# Patient Record
Sex: FEMALE | Race: BLACK OR AFRICAN AMERICAN | Employment: UNEMPLOYED | URBAN - METROPOLITAN AREA
[De-identification: names, ages, dates, MRNs, and addresses within clinical notes are randomized per-mention and may not be internally consistent; named-entity substitution may affect disease eponyms.]

---

## 2021-03-03 ENCOUNTER — HOSPITAL ENCOUNTER (EMERGENCY)
Age: 61
Discharge: HOME | End: 2021-03-04
Attending: EMERGENCY MEDICINE
Payer: MEDICAID

## 2021-03-03 ENCOUNTER — APPOINTMENT (OUTPATIENT)
Dept: GENERAL RADIOLOGY | Age: 61
End: 2021-03-03
Attending: EMERGENCY MEDICINE
Payer: MEDICAID

## 2021-03-03 DIAGNOSIS — R07.9 CHEST PAIN, UNSPECIFIED TYPE: Primary | ICD-10-CM

## 2021-03-03 PROCEDURE — 71045 X-RAY EXAM CHEST 1 VIEW: CPT

## 2021-03-03 PROCEDURE — 93005 ELECTROCARDIOGRAM TRACING: CPT

## 2021-03-03 PROCEDURE — 99285 EMERGENCY DEPT VISIT HI MDM: CPT

## 2021-03-04 VITALS
SYSTOLIC BLOOD PRESSURE: 97 MMHG | RESPIRATION RATE: 13 BRPM | WEIGHT: 108 LBS | OXYGEN SATURATION: 99 % | TEMPERATURE: 98.3 F | HEART RATE: 87 BPM | DIASTOLIC BLOOD PRESSURE: 68 MMHG | BODY MASS INDEX: 20.41 KG/M2

## 2021-03-04 LAB
ALBUMIN SERPL-MCNC: 2.9 G/DL (ref 3.4–5)
ALBUMIN/GLOB SERPL: 0.5 {RATIO} (ref 0.8–1.7)
ALP SERPL-CCNC: 86 U/L (ref 45–117)
ALT SERPL-CCNC: 29 U/L (ref 13–56)
ANION GAP SERPL CALC-SCNC: 7 MMOL/L (ref 3–18)
APPEARANCE UR: CLEAR
AST SERPL-CCNC: 17 U/L (ref 10–38)
ATRIAL RATE: 88 BPM
BASOPHILS # BLD: 0 K/UL (ref 0–0.1)
BASOPHILS NFR BLD: 1 % (ref 0–2)
BILIRUB SERPL-MCNC: 0.2 MG/DL (ref 0.2–1)
BILIRUB UR QL: NEGATIVE
BUN SERPL-MCNC: 52 MG/DL (ref 7–18)
BUN/CREAT SERPL: 15 (ref 12–20)
CALCIUM SERPL-MCNC: 9.3 MG/DL (ref 8.5–10.1)
CALCULATED P AXIS, ECG09: 58 DEGREES
CALCULATED R AXIS, ECG10: 26 DEGREES
CALCULATED T AXIS, ECG11: 56 DEGREES
CHLORIDE SERPL-SCNC: 117 MMOL/L (ref 100–111)
CK MB CFR SERPL CALC: 3.3 % (ref 0–4)
CK MB CFR SERPL CALC: 4 % (ref 0–4)
CK MB SERPL-MCNC: 1.4 NG/ML (ref 5–25)
CK MB SERPL-MCNC: 1.9 NG/ML (ref 5–25)
CK SERPL-CCNC: 43 U/L (ref 26–192)
CK SERPL-CCNC: 48 U/L (ref 26–192)
CO2 SERPL-SCNC: 18 MMOL/L (ref 21–32)
COLOR UR: YELLOW
CREAT SERPL-MCNC: 3.58 MG/DL (ref 0.6–1.3)
DIAGNOSIS, 93000: NORMAL
DIFFERENTIAL METHOD BLD: ABNORMAL
EOSINOPHIL # BLD: 0.4 K/UL (ref 0–0.4)
EOSINOPHIL NFR BLD: 12 % (ref 0–5)
EPITH CASTS URNS QL MICRO: NORMAL /LPF (ref 0–5)
ERYTHROCYTE [DISTWIDTH] IN BLOOD BY AUTOMATED COUNT: 19.3 % (ref 11.6–14.5)
GLOBULIN SER CALC-MCNC: 5.5 G/DL (ref 2–4)
GLUCOSE SERPL-MCNC: 83 MG/DL (ref 74–99)
GLUCOSE UR STRIP.AUTO-MCNC: NEGATIVE MG/DL
HCT VFR BLD AUTO: 30.8 % (ref 35–45)
HGB BLD-MCNC: 9.5 G/DL (ref 12–16)
HGB UR QL STRIP: NEGATIVE
KETONES UR QL STRIP.AUTO: NEGATIVE MG/DL
LEUKOCYTE ESTERASE UR QL STRIP.AUTO: NEGATIVE
LYMPHOCYTES # BLD: 1.3 K/UL (ref 0.9–3.6)
LYMPHOCYTES NFR BLD: 38 % (ref 21–52)
MCH RBC QN AUTO: 30 PG (ref 24–34)
MCHC RBC AUTO-ENTMCNC: 30.8 G/DL (ref 31–37)
MCV RBC AUTO: 97.2 FL (ref 74–97)
MONOCYTES # BLD: 0.5 K/UL (ref 0.05–1.2)
MONOCYTES NFR BLD: 14 % (ref 3–10)
NEUTS SEG # BLD: 1.2 K/UL (ref 1.8–8)
NEUTS SEG NFR BLD: 35 % (ref 40–73)
NITRITE UR QL STRIP.AUTO: NEGATIVE
P-R INTERVAL, ECG05: 168 MS
PH UR STRIP: 6.5 [PH] (ref 5–8)
PLATELET # BLD AUTO: 381 K/UL (ref 135–420)
PMV BLD AUTO: 8.8 FL (ref 9.2–11.8)
POTASSIUM SERPL-SCNC: 5.3 MMOL/L (ref 3.5–5.5)
PROT SERPL-MCNC: 8.4 G/DL (ref 6.4–8.2)
PROT UR STRIP-MCNC: 300 MG/DL
Q-T INTERVAL, ECG07: 356 MS
QRS DURATION, ECG06: 72 MS
QTC CALCULATION (BEZET), ECG08: 430 MS
RBC # BLD AUTO: 3.17 M/UL (ref 4.2–5.3)
SODIUM SERPL-SCNC: 142 MMOL/L (ref 136–145)
SP GR UR REFRACTOMETRY: 1.01 (ref 1–1.03)
TROPONIN I SERPL-MCNC: <0.02 NG/ML (ref 0–0.04)
TROPONIN I SERPL-MCNC: <0.02 NG/ML (ref 0–0.04)
UROBILINOGEN UR QL STRIP.AUTO: 0.2 EU/DL (ref 0.2–1)
VENTRICULAR RATE, ECG03: 88 BPM
WBC # BLD AUTO: 3.4 K/UL (ref 4.6–13.2)
WBC URNS QL MICRO: NORMAL /HPF (ref 0–5)

## 2021-03-04 PROCEDURE — 80053 COMPREHEN METABOLIC PANEL: CPT

## 2021-03-04 PROCEDURE — 85025 COMPLETE CBC W/AUTO DIFF WBC: CPT

## 2021-03-04 PROCEDURE — 82550 ASSAY OF CK (CPK): CPT

## 2021-03-04 PROCEDURE — 81001 URINALYSIS AUTO W/SCOPE: CPT

## 2021-03-04 NOTE — ED PROVIDER NOTES
EMERGENCY DEPARTMENT HISTORY AND PHYSICAL EXAM  This was created with voice recognition software and transcription errors may be present. 10:51 PM  Date: 3/3/2021  Patient Name: Ashley Cope    History of Presenting Illness     Chief Complaint:    History Provided By:     HPI: Ashley Cope is a 64 y.o. female past medical history of AIDS ESRD on dialysis hep C hep B HIV latent syphilis TB schizoaffective disorder and substance abuse who presents with chest pain. Patient recently moved here from [facility] she states that she was dialyzed yesterday but I do see that she is not been dialyzed since the 28th per nursing notes. She notes that she has had chest pain has been constant for 2 days denies any abdominal pain denies fevers chills. She states she woke up from sleep and was complaining of chest pain so her daughter called EMS. No radiation. Nonexertional no vomiting or diaphoresis. PCP: Rick, Not On File      Past History     Past Medical History:  Past Medical History:   Diagnosis Date    Acute kidney injury ([facility]     AIDS (acquired immune deficiency syndrome) ([facility]     Anemia     Esophageal candidiasis ([facility]     ESRD (end stage renal disease) ([facility]     HCV (hepatitis C virus)     Hepatitis B     Hepatitis C     HIV (human immunodeficiency virus infection) ([facility]     HIV (human immunodeficiency virus infection) ([facility]     Late latent syphilis     Pulmonary TB     Schizoaffective disorder ([facility]     Substance abuse ([facility]        Past Surgical History:  Past Surgical History:   Procedure Laterality Date    HX GYN      HX NEPHRECTOMY      left kidney removed at age 17   Doloris Chance NEPHRECTOMY         Family History:  No family history on file. Social History:  Social History     Tobacco Use    Smoking status: Not on file   Substance Use Topics    Alcohol use: Not on file    Drug use: Not on file       Allergies:   Allergies   Allergen Reactions    Aspirin Hives    Pcn [Penicillins] Hives       Review of Systems     Review of Systems   All other systems reviewed and are negative. 10 point review of systems otherwise negative unless noted in HPI. Physical Exam       Physical Exam  Constitutional:       Appearance: She is well-developed. HENT:      Head: Normocephalic and atraumatic. Eyes:      Pupils: Pupils are equal, round, and reactive to light. Neck:      Musculoskeletal: Normal range of motion and neck supple. Cardiovascular:      Rate and Rhythm: Normal rate and regular rhythm. Heart sounds: Normal heart sounds. No murmur. No friction rub. Pulmonary:      Effort: Pulmonary effort is normal. No respiratory distress. Breath sounds: Normal breath sounds. No wheezing. Abdominal:      General: There is no distension. Palpations: Abdomen is soft. Tenderness: There is no abdominal tenderness. There is no guarding or rebound. Musculoskeletal: Normal range of motion. Skin:     General: Skin is warm and dry. Neurological:      Mental Status: She is alert and oriented to person, place, and time. Psychiatric:         Behavior: Behavior normal.         Thought Content: Thought content normal.         Diagnostic Study Results     Vital Signs  EKG: KG shows sinus at 88 normal axis normal intervals there is no ST elevation or depression and no hypertrophy  Labs: BC unremarkable chemistry consistent with chronic renal disease troponin negative x2 chest x-ray is unremarkable consistent with dialysis  Imaging:     Medical Decision Making     ED Course: Progress Notes, Reevaluation, and Consults:      Provider Notes (Medical Decision Making): Patient presents with chest pain troponin negative x2 afebrile will refer to ID as well as a PCP for further observation and management         Diagnosis     Clinical Impression: No diagnosis found.     Disposition:    Patient's Medications   Start Taking    No medications on file   Continue Taking    EMTRICITABINE-TENOFOVIR (TRUVADA) 200-300 MG PER TABLET    Take 1 tablet by mouth daily. RALTEGRAVIR (ISENTRESS) 400 MG TABLET    Take 400 mg by mouth two (2) times a day. RISPERIDONE (RISPERDAL PO)    Take  by mouth.    These Medications have changed    No medications on file   Stop Taking    No medications on file

## 2021-03-04 NOTE — ED NOTES
Patient ambulated to bathroom.    On return to room, I informed pt that I would be in in 30 minutes to dry his blood

## 2021-03-04 NOTE — ED TRIAGE NOTES
Pt brought in by ems for complaint of lethargy. Per patient she states she woke up and she just did not feel good. States her belly cannot breath. Pt abdomen distended and taut. Per medic pt has not had dialysis since 2/28/21 due to moving here from [place].

## 2021-10-24 ENCOUNTER — HOSPITAL ENCOUNTER (OUTPATIENT)
Age: 61
Setting detail: OBSERVATION
Discharge: HOME HEALTH | End: 2021-10-29
Attending: STUDENT IN AN ORGANIZED HEALTH CARE EDUCATION/TRAINING PROGRAM | Admitting: STUDENT IN AN ORGANIZED HEALTH CARE EDUCATION/TRAINING PROGRAM
Payer: COMMERCIAL

## 2021-10-24 ENCOUNTER — APPOINTMENT (OUTPATIENT)
Dept: GENERAL RADIOLOGY | Age: 61
End: 2021-10-24
Attending: STUDENT IN AN ORGANIZED HEALTH CARE EDUCATION/TRAINING PROGRAM
Payer: COMMERCIAL

## 2021-10-24 DIAGNOSIS — E87.20 LACTIC ACIDOSIS: ICD-10-CM

## 2021-10-24 DIAGNOSIS — E87.29 RESPIRATORY ACIDOSIS: ICD-10-CM

## 2021-10-24 LAB
ALBUMIN SERPL-MCNC: 2.3 G/DL (ref 3.4–5)
ALBUMIN/GLOB SERPL: 0.4 {RATIO} (ref 0.8–1.7)
ALP SERPL-CCNC: 115 U/L (ref 45–117)
ALT SERPL-CCNC: 12 U/L (ref 13–56)
ANION GAP SERPL CALC-SCNC: 12 MMOL/L (ref 3–18)
ARTERIAL PATENCY WRIST A: POSITIVE
AST SERPL-CCNC: 37 U/L (ref 10–38)
BASE DEFICIT BLD-SCNC: 5.5 MMOL/L
BASOPHILS # BLD: 0.2 K/UL (ref 0–0.1)
BASOPHILS NFR BLD: 2 % (ref 0–2)
BDY SITE: ABNORMAL
BILIRUB SERPL-MCNC: <0.1 MG/DL (ref 0.2–1)
BNP SERPL-MCNC: ABNORMAL PG/ML (ref 0–900)
BUN SERPL-MCNC: 26 MG/DL (ref 7–18)
BUN/CREAT SERPL: 5 (ref 12–20)
CALCIUM SERPL-MCNC: 7 MG/DL (ref 8.5–10.1)
CHLORIDE SERPL-SCNC: 108 MMOL/L (ref 100–111)
CK MB CFR SERPL CALC: 0.8 % (ref 0–4)
CK MB SERPL-MCNC: 1.5 NG/ML (ref 5–25)
CK SERPL-CCNC: 184 U/L (ref 26–192)
CO2 SERPL-SCNC: 20 MMOL/L (ref 21–32)
CREAT SERPL-MCNC: 5.2 MG/DL (ref 0.6–1.3)
DIFFERENTIAL METHOD BLD: ABNORMAL
EOSINOPHIL # BLD: 0.7 K/UL (ref 0–0.4)
EOSINOPHIL NFR BLD: 7 % (ref 0–5)
ERYTHROCYTE [DISTWIDTH] IN BLOOD BY AUTOMATED COUNT: 16.4 % (ref 11.6–14.5)
GAS FLOW.O2 O2 DELIVERY SYS: ABNORMAL L/MIN
GAS FLOW.O2 SETTING OXYMISER: 8 BPM
GLOBULIN SER CALC-MCNC: 5.4 G/DL (ref 2–4)
GLUCOSE SERPL-MCNC: 140 MG/DL (ref 74–99)
HBV SURFACE AG SER QL: 0.27 INDEX
HBV SURFACE AG SER QL: NEGATIVE
HCO3 BLD-SCNC: 22.2 MMOL/L (ref 22–26)
HCT VFR BLD AUTO: 32.2 % (ref 35–45)
HGB BLD-MCNC: 9.5 G/DL (ref 12–16)
LACTATE BLD-SCNC: 3.25 MMOL/L (ref 0.4–2)
LYMPHOCYTES # BLD: 5.6 K/UL (ref 0.9–3.6)
LYMPHOCYTES NFR BLD: 53 % (ref 21–52)
MAGNESIUM SERPL-MCNC: 1.7 MG/DL (ref 1.6–2.6)
MCH RBC QN AUTO: 29.1 PG (ref 24–34)
MCHC RBC AUTO-ENTMCNC: 29.5 G/DL (ref 31–37)
MCV RBC AUTO: 98.5 FL (ref 78–100)
MONOCYTES # BLD: 0.8 K/UL (ref 0.05–1.2)
MONOCYTES NFR BLD: 8 % (ref 3–10)
NEUTS SEG # BLD: 3.1 K/UL (ref 1.8–8)
NEUTS SEG NFR BLD: 30 % (ref 40–73)
O2/TOTAL GAS SETTING VFR VENT: 70 %
PCO2 BLD: 52.6 MMHG (ref 35–45)
PEEP RESPIRATORY: 5 CMH2O
PH BLD: 7.23 [PH] (ref 7.35–7.45)
PIP ISTAT,IPIP: 18
PLATELET # BLD AUTO: 418 K/UL (ref 135–420)
PLATELET COMMENTS,PCOM: ABNORMAL
PMV BLD AUTO: 10.4 FL (ref 9.2–11.8)
PO2 BLD: 108 MMHG (ref 80–100)
POTASSIUM SERPL-SCNC: 3.8 MMOL/L (ref 3.5–5.5)
PRESSURE SUPPORT SETTING VENT: 13 CMH2O
PROT SERPL-MCNC: 7.7 G/DL (ref 6.4–8.2)
RBC # BLD AUTO: 3.27 M/UL (ref 4.2–5.3)
RBC MORPH BLD: ABNORMAL
SAO2 % BLD: 97 % (ref 92–97)
SERVICE CMNT-IMP: ABNORMAL
SERVICE CMNT-IMP: ABNORMAL
SODIUM SERPL-SCNC: 140 MMOL/L (ref 136–145)
SPECIMEN TYPE: ABNORMAL
TROPONIN I SERPL-MCNC: <0.02 NG/ML (ref 0–0.04)
VENTILATION MODE VENT: ABNORMAL
WBC # BLD AUTO: 10.4 K/UL (ref 4.6–13.2)

## 2021-10-24 PROCEDURE — 83735 ASSAY OF MAGNESIUM: CPT

## 2021-10-24 PROCEDURE — 82803 BLOOD GASES ANY COMBINATION: CPT

## 2021-10-24 PROCEDURE — 71045 X-RAY EXAM CHEST 1 VIEW: CPT

## 2021-10-24 PROCEDURE — 82553 CREATINE MB FRACTION: CPT

## 2021-10-24 PROCEDURE — 85025 COMPLETE CBC W/AUTO DIFF WBC: CPT

## 2021-10-24 PROCEDURE — 93005 ELECTROCARDIOGRAM TRACING: CPT

## 2021-10-24 PROCEDURE — 86704 HEP B CORE ANTIBODY TOTAL: CPT

## 2021-10-24 PROCEDURE — 80053 COMPREHEN METABOLIC PANEL: CPT

## 2021-10-24 PROCEDURE — 74011250636 HC RX REV CODE- 250/636: Performed by: STUDENT IN AN ORGANIZED HEALTH CARE EDUCATION/TRAINING PROGRAM

## 2021-10-24 PROCEDURE — 94640 AIRWAY INHALATION TREATMENT: CPT

## 2021-10-24 PROCEDURE — 99285 EMERGENCY DEPT VISIT HI MDM: CPT

## 2021-10-24 PROCEDURE — 74011250637 HC RX REV CODE- 250/637

## 2021-10-24 PROCEDURE — 74011000250 HC RX REV CODE- 250: Performed by: STUDENT IN AN ORGANIZED HEALTH CARE EDUCATION/TRAINING PROGRAM

## 2021-10-24 PROCEDURE — 87340 HEPATITIS B SURFACE AG IA: CPT

## 2021-10-24 PROCEDURE — 96367 TX/PROPH/DG ADDL SEQ IV INF: CPT

## 2021-10-24 PROCEDURE — 96365 THER/PROPH/DIAG IV INF INIT: CPT

## 2021-10-24 PROCEDURE — 83880 ASSAY OF NATRIURETIC PEPTIDE: CPT

## 2021-10-24 PROCEDURE — 94660 CPAP INITIATION&MGMT: CPT

## 2021-10-24 PROCEDURE — 90935 HEMODIALYSIS ONE EVALUATION: CPT

## 2021-10-24 PROCEDURE — 83605 ASSAY OF LACTIC ACID: CPT

## 2021-10-24 PROCEDURE — 36600 WITHDRAWAL OF ARTERIAL BLOOD: CPT

## 2021-10-24 PROCEDURE — 87040 BLOOD CULTURE FOR BACTERIA: CPT

## 2021-10-24 PROCEDURE — 96375 TX/PRO/DX INJ NEW DRUG ADDON: CPT

## 2021-10-24 PROCEDURE — 96366 THER/PROPH/DIAG IV INF ADDON: CPT

## 2021-10-24 PROCEDURE — 86706 HEP B SURFACE ANTIBODY: CPT

## 2021-10-24 RX ORDER — IPRATROPIUM BROMIDE AND ALBUTEROL SULFATE 2.5; .5 MG/3ML; MG/3ML
3 SOLUTION RESPIRATORY (INHALATION)
Status: DISPENSED | OUTPATIENT
Start: 2021-10-24 | End: 2021-10-24

## 2021-10-24 RX ORDER — LORAZEPAM 2 MG/ML
1 INJECTION INTRAMUSCULAR
Status: COMPLETED | OUTPATIENT
Start: 2021-10-24 | End: 2021-10-24

## 2021-10-24 RX ORDER — SODIUM CHLORIDE 0.9 % (FLUSH) 0.9 %
5-10 SYRINGE (ML) INJECTION AS NEEDED
Status: DISCONTINUED | OUTPATIENT
Start: 2021-10-24 | End: 2021-10-29 | Stop reason: HOSPADM

## 2021-10-24 RX ORDER — HEPARIN SODIUM 1000 [USP'U]/ML
4000 INJECTION, SOLUTION INTRAVENOUS; SUBCUTANEOUS
Status: DISCONTINUED | OUTPATIENT
Start: 2021-10-24 | End: 2021-10-29 | Stop reason: HOSPADM

## 2021-10-24 RX ORDER — MAGNESIUM SULFATE HEPTAHYDRATE 40 MG/ML
2 INJECTION, SOLUTION INTRAVENOUS ONCE
Status: COMPLETED | OUTPATIENT
Start: 2021-10-24 | End: 2021-10-24

## 2021-10-24 RX ORDER — NITROGLYCERIN 40 MG/100ML
100 INJECTION INTRAVENOUS
Status: DISCONTINUED | OUTPATIENT
Start: 2021-10-24 | End: 2021-10-28

## 2021-10-24 RX ADMIN — LORAZEPAM 1 MG: 2 INJECTION INTRAMUSCULAR; INTRAVENOUS at 20:26

## 2021-10-24 RX ADMIN — WATER 2 G: 1 INJECTION INTRAMUSCULAR; INTRAVENOUS; SUBCUTANEOUS at 20:39

## 2021-10-24 RX ADMIN — NITROGLYCERIN 1 INCH: 20 OINTMENT TOPICAL at 20:13

## 2021-10-24 RX ADMIN — IPRATROPIUM BROMIDE AND ALBUTEROL SULFATE 3 ML: .5; 2.5 SOLUTION RESPIRATORY (INHALATION) at 20:41

## 2021-10-24 RX ADMIN — MAGNESIUM SULFATE 2 G: 2 INJECTION INTRAVENOUS at 20:38

## 2021-10-24 RX ADMIN — METHYLPREDNISOLONE SODIUM SUCCINATE 125 MG: 125 INJECTION, POWDER, FOR SOLUTION INTRAMUSCULAR; INTRAVENOUS at 20:38

## 2021-10-24 RX ADMIN — NITROGLYCERIN 100 MCG/MIN: 40 INJECTION INTRAVENOUS at 20:55

## 2021-10-24 RX ADMIN — VANCOMYCIN HYDROCHLORIDE 1000 MG: 1 INJECTION, POWDER, LYOPHILIZED, FOR SOLUTION INTRAVENOUS at 21:07

## 2021-10-24 NOTE — ED TRIAGE NOTES
Pt to ED via EMS with SOB that started last night. Pt with history of renal failure, on dialysis with TDC to right upper chest.  EMS started BiPap in route and attempted to administer breathing treatments but pt unable to tolerate. Pt presents to ED awake alert and oriented, SOB and Dyspnea at rest noted, pt placed on non-rebreather at time of triage.

## 2021-10-25 PROBLEM — Z99.2 ESRD (END STAGE RENAL DISEASE) ON DIALYSIS (HCC): Status: ACTIVE | Noted: 2021-10-25

## 2021-10-25 PROBLEM — E87.70 VOLUME OVERLOAD: Status: ACTIVE | Noted: 2021-10-25

## 2021-10-25 PROBLEM — J96.90 RESPIRATORY FAILURE (HCC): Status: ACTIVE | Noted: 2021-10-25

## 2021-10-25 PROBLEM — R09.02 HYPOXIA: Status: ACTIVE | Noted: 2021-10-25

## 2021-10-25 PROBLEM — N18.6 ESRD (END STAGE RENAL DISEASE) ON DIALYSIS (HCC): Status: ACTIVE | Noted: 2021-10-25

## 2021-10-25 PROBLEM — I10 HYPERTENSION: Status: ACTIVE | Noted: 2021-10-25

## 2021-10-25 LAB
ALBUMIN SERPL-MCNC: 2 G/DL (ref 3.4–5)
ALBUMIN/GLOB SERPL: 0.4 {RATIO} (ref 0.8–1.7)
ALP SERPL-CCNC: 79 U/L (ref 45–117)
ALT SERPL-CCNC: 9 U/L (ref 13–56)
ANION GAP SERPL CALC-SCNC: 6 MMOL/L (ref 3–18)
AST SERPL-CCNC: 24 U/L (ref 10–38)
ATRIAL RATE: 132 BPM
BASOPHILS # BLD: 0 K/UL (ref 0–0.1)
BASOPHILS NFR BLD: 1 % (ref 0–2)
BILIRUB SERPL-MCNC: 0.3 MG/DL (ref 0.2–1)
BUN SERPL-MCNC: 14 MG/DL (ref 7–18)
BUN/CREAT SERPL: 5 (ref 12–20)
CALCIUM SERPL-MCNC: 7.5 MG/DL (ref 8.5–10.1)
CALCULATED R AXIS, ECG10: 2 DEGREES
CALCULATED T AXIS, ECG11: 87 DEGREES
CHLORIDE SERPL-SCNC: 103 MMOL/L (ref 100–111)
CO2 SERPL-SCNC: 27 MMOL/L (ref 21–32)
CREAT SERPL-MCNC: 3.04 MG/DL (ref 0.6–1.3)
DIAGNOSIS, 93000: NORMAL
DIFFERENTIAL METHOD BLD: ABNORMAL
EOSINOPHIL # BLD: 0 K/UL (ref 0–0.4)
EOSINOPHIL NFR BLD: 1 % (ref 0–5)
ERYTHROCYTE [DISTWIDTH] IN BLOOD BY AUTOMATED COUNT: 16.3 % (ref 11.6–14.5)
GLOBULIN SER CALC-MCNC: 4.6 G/DL (ref 2–4)
GLUCOSE SERPL-MCNC: 131 MG/DL (ref 74–99)
HBV SURFACE AB SER QL IA: POSITIVE
HBV SURFACE AB SERPL IA-ACNC: 20.72 MIU/ML
HCT VFR BLD AUTO: 23.7 % (ref 35–45)
HCT VFR BLD AUTO: 26.3 % (ref 35–45)
HEP BS AB COMMENT,HBSAC: NORMAL
HGB BLD-MCNC: 7.4 G/DL (ref 12–16)
HGB BLD-MCNC: 8.2 G/DL (ref 12–16)
LYMPHOCYTES # BLD: 1.1 K/UL (ref 0.9–3.6)
LYMPHOCYTES NFR BLD: 26 % (ref 21–52)
MCH RBC QN AUTO: 28.8 PG (ref 24–34)
MCHC RBC AUTO-ENTMCNC: 31.2 G/DL (ref 31–37)
MCV RBC AUTO: 92.2 FL (ref 78–100)
MONOCYTES # BLD: 0.5 K/UL (ref 0.05–1.2)
MONOCYTES NFR BLD: 12 % (ref 3–10)
NEUTS SEG # BLD: 2.4 K/UL (ref 1.8–8)
NEUTS SEG NFR BLD: 60 % (ref 40–73)
P-R INTERVAL, ECG05: 112 MS
PHOSPHATE SERPL-MCNC: 3.9 MG/DL (ref 2.5–4.9)
PLATELET # BLD AUTO: 287 K/UL (ref 135–420)
PMV BLD AUTO: 10.1 FL (ref 9.2–11.8)
POTASSIUM SERPL-SCNC: 3.7 MMOL/L (ref 3.5–5.5)
PROT SERPL-MCNC: 6.6 G/DL (ref 6.4–8.2)
Q-T INTERVAL, ECG07: 384 MS
QRS DURATION, ECG06: 62 MS
QTC CALCULATION (BEZET), ECG08: 568 MS
RBC # BLD AUTO: 2.57 M/UL (ref 4.2–5.3)
SODIUM SERPL-SCNC: 136 MMOL/L (ref 136–145)
VENTRICULAR RATE, ECG03: 132 BPM
WBC # BLD AUTO: 4 K/UL (ref 4.6–13.2)

## 2021-10-25 PROCEDURE — 96375 TX/PRO/DX INJ NEW DRUG ADDON: CPT

## 2021-10-25 PROCEDURE — 65270000029 HC RM PRIVATE

## 2021-10-25 PROCEDURE — 74011250637 HC RX REV CODE- 250/637: Performed by: STUDENT IN AN ORGANIZED HEALTH CARE EDUCATION/TRAINING PROGRAM

## 2021-10-25 PROCEDURE — 74011250637 HC RX REV CODE- 250/637: Performed by: FAMILY MEDICINE

## 2021-10-25 PROCEDURE — 74011250636 HC RX REV CODE- 250/636: Performed by: STUDENT IN AN ORGANIZED HEALTH CARE EDUCATION/TRAINING PROGRAM

## 2021-10-25 PROCEDURE — 96366 THER/PROPH/DIAG IV INF ADDON: CPT

## 2021-10-25 PROCEDURE — 85014 HEMATOCRIT: CPT

## 2021-10-25 PROCEDURE — 85025 COMPLETE CBC W/AUTO DIFF WBC: CPT

## 2021-10-25 PROCEDURE — 74011000250 HC RX REV CODE- 250: Performed by: STUDENT IN AN ORGANIZED HEALTH CARE EDUCATION/TRAINING PROGRAM

## 2021-10-25 PROCEDURE — 84100 ASSAY OF PHOSPHORUS: CPT

## 2021-10-25 PROCEDURE — 74011250636 HC RX REV CODE- 250/636: Performed by: INTERNAL MEDICINE

## 2021-10-25 PROCEDURE — 80053 COMPREHEN METABOLIC PANEL: CPT

## 2021-10-25 PROCEDURE — 99223 1ST HOSP IP/OBS HIGH 75: CPT | Performed by: STUDENT IN AN ORGANIZED HEALTH CARE EDUCATION/TRAINING PROGRAM

## 2021-10-25 PROCEDURE — 99218 HC RM OBSERVATION: CPT

## 2021-10-25 PROCEDURE — 94660 CPAP INITIATION&MGMT: CPT

## 2021-10-25 PROCEDURE — 96372 THER/PROPH/DIAG INJ SC/IM: CPT

## 2021-10-25 RX ORDER — QUETIAPINE FUMARATE 25 MG/1
25 TABLET, FILM COATED ORAL
COMMUNITY
End: 2022-02-18

## 2021-10-25 RX ORDER — ACETAMINOPHEN 325 MG/1
650 TABLET ORAL ONCE
Status: COMPLETED | OUTPATIENT
Start: 2021-10-25 | End: 2021-10-25

## 2021-10-25 RX ORDER — LAMIVUDINE 150 MG/1
75 TABLET, FILM COATED ORAL DAILY
COMMUNITY
End: 2022-02-18

## 2021-10-25 RX ORDER — FUROSEMIDE 10 MG/ML
40 INJECTION INTRAMUSCULAR; INTRAVENOUS ONCE
Status: COMPLETED | OUTPATIENT
Start: 2021-10-25 | End: 2021-10-25

## 2021-10-25 RX ORDER — LAMIVUDINE 150 MG/1
150 TABLET, FILM COATED ORAL DAILY
Status: DISCONTINUED | OUTPATIENT
Start: 2021-10-25 | End: 2021-10-29 | Stop reason: HOSPADM

## 2021-10-25 RX ORDER — ACETAMINOPHEN 650 MG/1
650 SUPPOSITORY RECTAL
Status: DISCONTINUED | OUTPATIENT
Start: 2021-10-25 | End: 2021-10-29 | Stop reason: HOSPADM

## 2021-10-25 RX ORDER — GABAPENTIN 100 MG/1
200 CAPSULE ORAL
Status: DISCONTINUED | OUTPATIENT
Start: 2021-10-25 | End: 2021-10-29 | Stop reason: HOSPADM

## 2021-10-25 RX ORDER — LANOLIN ALCOHOL/MO/W.PET/CERES
6 CREAM (GRAM) TOPICAL
COMMUNITY
End: 2022-02-18

## 2021-10-25 RX ORDER — SODIUM CHLORIDE 0.9 % (FLUSH) 0.9 %
5-40 SYRINGE (ML) INJECTION AS NEEDED
Status: DISCONTINUED | OUTPATIENT
Start: 2021-10-25 | End: 2021-10-29 | Stop reason: HOSPADM

## 2021-10-25 RX ORDER — RITONAVIR 100 MG/1
100 TABLET ORAL
Status: DISCONTINUED | OUTPATIENT
Start: 2021-10-25 | End: 2021-10-29 | Stop reason: HOSPADM

## 2021-10-25 RX ORDER — SENNOSIDES 8.6 MG/1
8.6 TABLET ORAL 2 TIMES DAILY
COMMUNITY
End: 2022-02-18

## 2021-10-25 RX ORDER — ESOMEPRAZOLE MAGNESIUM 40 MG/1
40 CAPSULE, DELAYED RELEASE ORAL DAILY
COMMUNITY
End: 2022-02-18

## 2021-10-25 RX ORDER — MIRTAZAPINE 15 MG/1
7.5 TABLET, FILM COATED ORAL
Status: DISCONTINUED | OUTPATIENT
Start: 2021-10-25 | End: 2021-10-29 | Stop reason: HOSPADM

## 2021-10-25 RX ORDER — MIRTAZAPINE 15 MG/1
7.5 TABLET, FILM COATED ORAL
COMMUNITY
End: 2022-02-18

## 2021-10-25 RX ORDER — SULFAMETHOXAZOLE AND TRIMETHOPRIM 400; 80 MG/1; MG/1
1 TABLET ORAL EVERY 12 HOURS
COMMUNITY
End: 2022-02-18

## 2021-10-25 RX ORDER — TENOFOVIR DISOPROXIL FUMARATE 300 MG/1
300 TABLET, FILM COATED ORAL
COMMUNITY
End: 2021-10-25

## 2021-10-25 RX ORDER — MORPHINE SULFATE 2 MG/ML
2 INJECTION, SOLUTION INTRAMUSCULAR; INTRAVENOUS ONCE
Status: COMPLETED | OUTPATIENT
Start: 2021-10-25 | End: 2021-10-25

## 2021-10-25 RX ORDER — SODIUM CHLORIDE 0.9 % (FLUSH) 0.9 %
5-40 SYRINGE (ML) INJECTION EVERY 8 HOURS
Status: DISCONTINUED | OUTPATIENT
Start: 2021-10-25 | End: 2021-10-29 | Stop reason: HOSPADM

## 2021-10-25 RX ORDER — DIPHENHYDRAMINE HCL 25 MG
50 CAPSULE ORAL
Status: DISCONTINUED | OUTPATIENT
Start: 2021-10-25 | End: 2021-10-29 | Stop reason: HOSPADM

## 2021-10-25 RX ORDER — HEPARIN SODIUM 5000 [USP'U]/ML
5000 INJECTION, SOLUTION INTRAVENOUS; SUBCUTANEOUS EVERY 8 HOURS
Status: DISCONTINUED | OUTPATIENT
Start: 2021-10-25 | End: 2021-10-29 | Stop reason: HOSPADM

## 2021-10-25 RX ORDER — POLYETHYLENE GLYCOL 3350 17 G/17G
17 POWDER, FOR SOLUTION ORAL DAILY PRN
Status: DISCONTINUED | OUTPATIENT
Start: 2021-10-25 | End: 2021-10-29 | Stop reason: HOSPADM

## 2021-10-25 RX ORDER — LANOLIN ALCOHOL/MO/W.PET/CERES
6 CREAM (GRAM) TOPICAL
Status: DISCONTINUED | OUTPATIENT
Start: 2021-10-25 | End: 2021-10-29 | Stop reason: HOSPADM

## 2021-10-25 RX ORDER — PANTOPRAZOLE SODIUM 40 MG/1
40 TABLET, DELAYED RELEASE ORAL
Status: DISCONTINUED | OUTPATIENT
Start: 2021-10-25 | End: 2021-10-29 | Stop reason: HOSPADM

## 2021-10-25 RX ORDER — ACETAMINOPHEN 325 MG/1
650 TABLET ORAL
Status: DISCONTINUED | OUTPATIENT
Start: 2021-10-25 | End: 2021-10-29 | Stop reason: HOSPADM

## 2021-10-25 RX ORDER — SENNOSIDES 8.6 MG/1
1 TABLET ORAL DAILY
Status: DISCONTINUED | OUTPATIENT
Start: 2021-10-25 | End: 2021-10-29 | Stop reason: HOSPADM

## 2021-10-25 RX ORDER — LANOLIN ALCOHOL/MO/W.PET/CERES
50 CREAM (GRAM) TOPICAL DAILY
Status: DISCONTINUED | OUTPATIENT
Start: 2021-10-25 | End: 2021-10-29 | Stop reason: HOSPADM

## 2021-10-25 RX ORDER — QUETIAPINE FUMARATE 25 MG/1
25 TABLET, FILM COATED ORAL
Status: DISCONTINUED | OUTPATIENT
Start: 2021-10-25 | End: 2021-10-29 | Stop reason: HOSPADM

## 2021-10-25 RX ORDER — GABAPENTIN 100 MG/1
200 CAPSULE ORAL
COMMUNITY
End: 2022-02-18

## 2021-10-25 RX ORDER — LANOLIN ALCOHOL/MO/W.PET/CERES
50 CREAM (GRAM) TOPICAL DAILY
COMMUNITY
End: 2022-02-18

## 2021-10-25 RX ADMIN — NITROGLYCERIN 100 MCG/MIN: 40 INJECTION INTRAVENOUS at 12:10

## 2021-10-25 RX ADMIN — Medication 10 ML: at 18:34

## 2021-10-25 RX ADMIN — Medication 10 ML: at 11:56

## 2021-10-25 RX ADMIN — Medication 10 ML: at 21:34

## 2021-10-25 RX ADMIN — QUETIAPINE FUMARATE 25 MG: 25 TABLET ORAL at 21:33

## 2021-10-25 RX ADMIN — GABAPENTIN 200 MG: 100 CAPSULE ORAL at 21:33

## 2021-10-25 RX ADMIN — DOLUTEGRAVIR SODIUM 50 MG: 50 TABLET, FILM COATED ORAL at 11:59

## 2021-10-25 RX ADMIN — SENNOSIDES 8.6 MG: 8.6 TABLET, COATED ORAL at 11:56

## 2021-10-25 RX ADMIN — DIPHENHYDRAMINE HYDROCHLORIDE 50 MG: 25 CAPSULE ORAL at 18:33

## 2021-10-25 RX ADMIN — HEPARIN SODIUM 4000 UNITS: 1000 INJECTION, SOLUTION INTRAVENOUS; SUBCUTANEOUS at 07:10

## 2021-10-25 RX ADMIN — MORPHINE SULFATE 2 MG: 2 INJECTION, SOLUTION INTRAMUSCULAR; INTRAVENOUS at 01:49

## 2021-10-25 RX ADMIN — HEPARIN SODIUM 5000 UNITS: 5000 INJECTION INTRAVENOUS; SUBCUTANEOUS at 09:03

## 2021-10-25 RX ADMIN — FUROSEMIDE 40 MG: 10 INJECTION, SOLUTION INTRAMUSCULAR; INTRAVENOUS at 04:38

## 2021-10-25 RX ADMIN — WATER 2 G: 1 INJECTION INTRAMUSCULAR; INTRAVENOUS; SUBCUTANEOUS at 04:38

## 2021-10-25 RX ADMIN — LAMIVUDINE 150 MG: 150 TABLET, FILM COATED ORAL at 11:55

## 2021-10-25 RX ADMIN — ACETAMINOPHEN 650 MG: 325 TABLET ORAL at 09:04

## 2021-10-25 RX ADMIN — DARUNAVIR 800 MG: 800 TABLET, FILM COATED ORAL at 11:56

## 2021-10-25 RX ADMIN — PANTOPRAZOLE SODIUM 40 MG: 20 TABLET, DELAYED RELEASE ORAL at 09:03

## 2021-10-25 RX ADMIN — MIRTAZAPINE 7.5 MG: 15 TABLET, FILM COATED ORAL at 21:33

## 2021-10-25 RX ADMIN — ACETAMINOPHEN 650 MG: 325 TABLET ORAL at 05:47

## 2021-10-25 RX ADMIN — Medication 6 MG: at 21:33

## 2021-10-25 RX ADMIN — RITONAVIR 100 MG: 100 TABLET, FILM COATED ORAL at 11:55

## 2021-10-25 NOTE — PROGRESS NOTES
attempted to complete the initial Spiritual Assessment of the patient in bed 2 of the emergency room and offer pastoral care support to the patient but found the patient resting peacefully at this time an unable to communicate now. There is no advance directive present. Patient does not have any Mandaen/cultural needs that will affect patients preferences in health care. Chaplains will continue to follow and will provide pastoral care on an as needed/requested basis.     [facility] Care Department  [phone]

## 2021-10-25 NOTE — ED NOTES
Assumed care from Dr. Abida Alvarez, in brief this is a 47-year-old female with ESRD on hemodialysis here for respiratory distress/failure, requiring BiPAP on arrival.  Initially was tachycardic to about 150s with a sinus mechanism, hypertensive to 230s over 148. Clinically suspect that she has flash pulmonary edema, as a bedside ultrasound shows significant B-lines throughout, and chest x-ray shows fluffy infiltrates. She has no leukocytosis but her lactic acid is elevated at 3.25. She received broad-spectrum antibiotics, however have withheld fluids as 30 cc/kg would do more harm than good injury with end-stage renal patient on dialysis who is clinically in fluid overload. She is started on BiPAP, nitroglycerin drip with improvement of her symptoms. She now has a heart rate of 105, blood pressure is improving on the nitroglycerin and symptomatically she feels better. We discussed with nephrology, they will arrange for emergent dialysis via the [facility] in her right chest wall, and we will continue to reassess the need for noninvasive ventilation. After dialysis patient feels significantly better, we are able to wean her oxygen requirements back to 2 L, however when she is without oxygen altogether her saturations are about 89 to 90%. Feel that she would benefit from hospital admission, potentially may need to be redialyzed.     Marie Mullen MD

## 2021-10-25 NOTE — PROGRESS NOTES
Progress Note    Ashley Cope  64 y.o. Admit Date: 10/24/2021  Active Problems:    Hypoxia (10/25/2021) POA: Unknown      ESRD [facility] (10/25/2021) POA: Unknown      Volume overload (10/25/2021) POA: Unknown      Hypertension (10/25/2021) POA: Unknown      Respiratory failure ([facility] (10/25/2021) POA: Unknown            Subjective:     Patient feels tired but better than last night after dialysis,offBIPAP. BP is better, still on Nitro drip. .       A comprehensive review of systems was negative except for that written in the History of Present Illness.     Objective:     Visit Vitals  BP (!) 142/91   Pulse (!) 116   Temp 97.4 °F (36.3 °C) (Axillary)   Resp 19   Ht 5' 1\" (1.549 m)   Wt 49 kg (108 lb)   SpO2 95%   BMI 20.41 kg/m²         Intake/Output Summary (Last 24 hours) at 10/25/2021 1234  Last data filed at 10/25/2021 0500  Gross per 24 hour   Intake 320 ml   Output 3000 ml   Net -2680 ml       Current Facility-Administered Medications   Medication Dose Route Frequency Provider Last Rate Last Admin    darunavir ethanolate (PREZISTA) tablet 800 mg  800 mg Oral DAILY WITH BREAKFAST May Castillo, DO   800 mg at 10/25/21 1156    dolutegravir (TIVICAY) tablet 50 mg  50 mg Oral DAILY May Castillo, DO   50 mg at 10/25/21 1159    pantoprazole (PROTONIX) tablet 40 mg  40 mg Oral ACB May Castillo, DO   40 mg at 10/25/21 6010    gabapentin (NEURONTIN) capsule 200 mg  200 mg Oral QHS May Castillo, DO        lamiVUDine (EPIVIR) tablet 150 mg  150 mg Oral DAILY May Castillo, DO   150 mg at 10/25/21 1155    melatonin tablet 6 mg  6 mg Oral QHS May Castillo, DO        mirtazapine (REMERON) tablet 7.5 mg  7.5 mg Oral QHS May Castillo, DO        pyridoxine (vitamin B6) (VITAMIN B-6) tablet 50 mg  50 mg Oral DAILY May Castillo,         QUEtiapine (SEROquel) tablet 25 mg  25 mg Oral QHS May Castillo,         ritonavir (NORVIR) tablet 100 mg 100 mg Oral DAILY WITH BREAKFAST May Castillo, DO   100 mg at 10/25/21 1155    senna (SENOKOT) tablet 8.6 mg  1 Tablet Oral DAILY May Castillo, DO   8.6 mg at 10/25/21 1156    sodium chloride (NS) flush 5-40 mL  5-40 mL IntraVENous Q8H May Castillo, DO   10 mL at 10/25/21 1156    sodium chloride (NS) flush 5-40 mL  5-40 mL IntraVENous PRN May Castillo, DO        acetaminophen (TYLENOL) tablet 650 mg  650 mg Oral Q6H PRN May Castillo, DO   650 mg at 10/25/21 6448    Or    acetaminophen (TYLENOL) suppository 650 mg  650 mg Rectal Q6H PRN May Castillo, DO        polyethylene glycol (MIRALAX) packet 17 g  17 g Oral DAILY PRN May Arana, DO        heparin (porcine) injection 5,000 Units  5,000 Units SubCUTAneous Q8H May Castillo, DO   5,000 Units at 10/25/21 8070    sodium chloride (NS) flush 5-10 mL  5-10 mL IntraVENous PRN May Castillo, DO        [Held by provider] nitroglycerin (TRIDIL) 400 mcg/ml infusion  100 mcg/min IntraVENous TITRATE May Castillo, DO 15 mL/hr at 10/24/21 2055 100 mcg/min at 10/24/21 2055    heparin (porcine) 1,000 unit/mL injection 4,000 Units  4,000 Units Hemodialysis DIALYSIS PRN Brissa Ponce DO   4,000 Units at 10/25/21 0710     Current Outpatient Medications   Medication Sig Dispense Refill    darunavir ethanolate (PREZISTA) 600 mg tablet Take 800 mg by mouth.  dolutegravir (Tivicay) 50 mg tab tablet Take 50 mg by mouth daily.  esomeprazole (NEXIUM) 40 mg capsule Take 40 mg by mouth daily.  gabapentin (NEURONTIN) 100 mg capsule Take 200 mg by mouth nightly.  lamiVUDine (EPIVIR) 150 mg tablet Take 150 mg by mouth daily.  melatonin 3 mg tablet Take 6 mg by mouth nightly.  mirtazapine (REMERON) 15 mg tablet Take 7.5 mg by mouth nightly.  pyridoxine, vitamin B6, (VITAMIN B-6) 50 mg tablet Take 50 mg by mouth daily.  QUEtiapine (SEROquel) 25 mg tablet Take 25 mg by mouth nightly.       ritonavir (NORVIR) 80 mg/mL solution Take 100 mg by mouth.  senna (SENOKOT) 8.6 mg tablet Take 8.6 mg by mouth two (2) times a day.  trimethoprim-sulfamethoxazole (BACTRIM, SEPTRA)  mg per tablet Take 1 Tablet by mouth every twelve (12) hours.  RISPERIDONE (RISPERDAL PO) Take  by mouth. Physical Exam:     Physical Exam:   General:  Tired & Malnourished, no distress, appears stated age. Neck: Supple, symmetrical, trachea midline, no adenopathy, thyroid: no enlargement/tenderness/nodules, no carotid bruit and no JVD. Lungs:   Decreased breath sounds. Heart:  Regular rate and rhythm, S1, S2 normal, no murmur, click, rub or gallop. Abdomen:   Soft, non-tender. Bowel sounds normal. No masses,  No organomegaly. Extremities: Extremities normal, atraumatic, no cyanosis or edema. Data Review:    CBC w/Diff    Recent Labs     10/25/21  1035 10/24/21  2000 10/24/21  2000   WBC 4.0*  --  10.4   RBC 2.57*  --  3.27*   HGB 7.4*  --  9.5*   HCT 23.7*  --  32.2*   MCV 92.2   < > 98.5   MCH 28.8   < > 29.1   MCHC 31.2   < > 29.5*   RDW 16.3*   < > 16.4*    < > = values in this interval not displayed. Recent Labs     10/25/21  1035 10/24/21  2000 10/24/21  2000   MONOS 12*  --  8   EOS 1  --  7*   BASOS 1   < > 2   RDW 16.3*   < > 16.4*    < > = values in this interval not displayed.         Comprehensive Metabolic Profile    Recent Labs     10/25/21  0909 10/24/21  2000    140   K 3.7 3.8    108   CO2 27 20*   BUN 14 26*   CREA 3.04* 5.20*    Recent Labs     10/25/21  0909 10/24/21  2000   CA 7.5* 7.0*   PHOS 3.9  --    ALB 2.0* 2.3*   TP 6.6 7.7   TBILI 0.3 <0.1*        21:46) Provider Status: Open   Study Result    Narrative & Impression   INDICATION: meets SIRS criteria     TECHNIQUE: Portable chest radiograph     COMPARISON: 3 March 2021     FINDINGS:     Right internal jugular tunneled central venous catheter with tip at the proximal  right atrium.     Enlarged cardiac silhouette. Pulmonary vascular congestion with increased  interstitial lung markings. No large pleural effusion. No pneumothorax.     Chronic deformity of the proximal right humerus.        IMPRESSION     Cardiogenic pulmonary edema               Impression:       Active Hospital Problems    Diagnosis Date Noted    Hypoxia 10/25/2021    ESRD (end stage renal disease) [facility] ([facility] 10/25/2021    Volume overload 10/25/2021    Hypertension 10/25/2021    Respiratory failure ([facility] 10/25/2021            Plan:     No need for any dialysis today, will Dialyze tomorrow through her Right IJ catheter. DC Nitro drip , restart her all home medicine. Will follow.       Brent Berumen MD

## 2021-10-25 NOTE — ED NOTES
Purposeful rounding completed:    Side rails up x 2:  YES  Bed in low position and wheels locked: YES  Call bell within reach: YES  Comfort addressed: YES    Toileting needs addressed: YES  Plan of care reviewed/updated with patient and or family members: YES  IV site assessed: YES  Pain assessed and addressed: YES, 0    Patient laying in bed with eyes closed. No distress observed.

## 2021-10-25 NOTE — ED PROVIDER NOTES
EMERGENCY DEPARTMENT HISTORY AND PHYSICAL EXAM      Date: 10/24/2021  Patient Name: Raheem Merchant    History of Presenting Illness     Chief Complaint   Patient presents with    Shortness of Breath       History (Context): Raheem Merchant is a 64 y.o. female with past medical history of HIV, pulmonary TB, late latent syphilis, HIV, substance use disorder, and ESRD on hemodialysis Tuesday, Thursday, Saturday who receives a full dialysis treatment yesterday brought in by EMS today for shortness of breath and difficulty breathing. Per EMS she was found sitting on her front steps tripoding, tachypneic, and one-word dyspneic. They immediately tried to start her on CPAP but she was unable to tolerate it. They additionally tried to start an albuterol treatment but was unable to tolerate it. She was satting in the 70s without CPAP. Per patient's daughter the symptoms were acute starting today and she is a patient who struggles with her volume status and fluid intake.       PCP: Diana Hernández MD    Current Facility-Administered Medications   Medication Dose Route Frequency Provider Last Rate Last Admin    darunavir ethanolate (PREZISTA) tablet 800 mg  800 mg Oral DAILY WITH BREAKFAST May Castillo, DO   800 mg at 10/25/21 1156    dolutegravir (TIVICAY) tablet 50 mg  50 mg Oral DAILY May Castillo, DO   50 mg at 10/25/21 1159    pantoprazole (PROTONIX) tablet 40 mg  40 mg Oral ACB May Castillo, DO   40 mg at 10/25/21 7754    gabapentin (NEURONTIN) capsule 200 mg  200 mg Oral QHS May Castillo,         lamiVUDine (EPIVIR) tablet 150 mg  150 mg Oral DAILY May Castillo, DO   150 mg at 10/25/21 1155    melatonin tablet 6 mg  6 mg Oral QHS May Castillo, DO        mirtazapine (REMERON) tablet 7.5 mg  7.5 mg Oral QHS May Castillo,         pyridoxine (vitamin B6) (VITAMIN B-6) tablet 50 mg  50 mg Oral DAILY May Castillo,         QUEtiapine (SEROquel) tablet 25 mg  25 mg Oral QHS May Castillo, DO        ritonavir (NORVIR) tablet 100 mg  100 mg Oral DAILY WITH BREAKFAST May Castillo, DO   100 mg at 10/25/21 1155    senna (SENOKOT) tablet 8.6 mg  1 Tablet Oral DAILY May Castillo, DO   8.6 mg at 10/25/21 1156    sodium chloride (NS) flush 5-40 mL  5-40 mL IntraVENous Q8H May Castillo, DO   10 mL at 10/25/21 1156    sodium chloride (NS) flush 5-40 mL  5-40 mL IntraVENous PRN May Castillo, DO        acetaminophen (TYLENOL) tablet 650 mg  650 mg Oral Q6H PRN May Castillo, DO   650 mg at 10/25/21 6960    Or    acetaminophen (TYLENOL) suppository 650 mg  650 mg Rectal Q6H PRN May Castillo, DO        polyethylene glycol (MIRALAX) packet 17 g  17 g Oral DAILY PRN ItapeMay rich, DO        heparin (porcine) injection 5,000 Units  5,000 Units SubCUTAneous Q8H May Castillo, DO   5,000 Units at 10/25/21 4690    sodium chloride (NS) flush 5-10 mL  5-10 mL IntraVENous PRN May Castillo, DO        [Held by provider] nitroglycerin (TRIDIL) 400 mcg/ml infusion  100 mcg/min IntraVENous TITRATE May Castillo, DO   Stopped at 10/25/21 1355    heparin (porcine) 1,000 unit/mL injection 4,000 Units  4,000 Units Hemodialysis DIALYSIS PRN [facility], DO   4,000 Units at 10/25/21 0710     Current Outpatient Medications   Medication Sig Dispense Refill    darunavir ethanolate (PREZISTA) 600 mg tablet Take 800 mg by mouth.  dolutegravir (Tivicay) 50 mg tab tablet Take 50 mg by mouth daily.  esomeprazole (NEXIUM) 40 mg capsule Take 40 mg by mouth daily.  gabapentin (NEURONTIN) 100 mg capsule Take 200 mg by mouth nightly.  lamiVUDine (EPIVIR) 150 mg tablet Take 150 mg by mouth daily.  melatonin 3 mg tablet Take 6 mg by mouth nightly.  mirtazapine (REMERON) 15 mg tablet Take 7.5 mg by mouth nightly.  pyridoxine, vitamin B6, (VITAMIN B-6) 50 mg tablet Take 50 mg by mouth daily.       QUEtiapine (SEROquel) 25 mg tablet Take 25 mg by mouth nightly.  ritonavir (NORVIR) 80 mg/mL solution Take 100 mg by mouth.  senna (SENOKOT) 8.6 mg tablet Take 8.6 mg by mouth two (2) times a day.  trimethoprim-sulfamethoxazole (BACTRIM, SEPTRA)  mg per tablet Take 1 Tablet by mouth every twelve (12) hours.  RISPERIDONE (RISPERDAL PO) Take  by mouth. Past History     Past Medical History:   Past Medical History:   Diagnosis Date    [facility] ([facility]     [facility]     Anemia     Esophageal candidiasis (HCC)     ESRD (end stage renal disease) (HCC)     HCV (hepatitis C virus)     Hepatitis B     Hepatitis C     HIV [facility] immunodeficiency virus infection[facility]     HIV [facility] immunodeficiency virus infection) [facility]     Late latent syphilis     Pulmonary TB     Sch[facility]oaffective disorder ([facility]     Substance abuse ([facility]        Past Surgical History:  Past Surgical History:   Procedure Laterality Date    HX GYN      HX NEPHRECTOMY      left kidney removed at age 17   [facility] NEPHRECTOMY         Family History:  No family history on file. Social History:   Social History     Tobacco Use    Smoking status: Not on file   Substance Use Topics    Alcohol use: Not on file    Drug use: Not on file       Allergies: Allergies   Allergen Reactions    Aspirin Hives    Pcn [Penicillins] Hives       PMH, PSH, family history, social history, allergies reviewed with the patient with significant items noted above. Review of Systems   Review of Systems   Constitutional: Negative for chills and fever. HENT: Negative for sore throat. Eyes: Negative for visual disturbance. Respiratory: Positive for cough and shortness of breath. Cardiovascular: Negative for chest pain and leg swelling. Gastrointestinal: Negative for abdominal pain, nausea and vomiting. Genitourinary: Negative for difficulty urinating. Musculoskeletal: Negative for myalgias. Skin: Negative for rash. Neurological: Negative for headaches. Physical Exam     Vitals:    10/25/21 1000 10/25/21 1100 10/25/21 1200 10/25/21 1313   BP: (!) 127/90 (!) 142/91 126/85    Pulse: 92 (!) 116 99    Resp: 20 19 25    Temp:       TempSrc:       SpO2: 100% 95% 100% 100%   Weight:       Height:           Physical Exam  Vitals and nursing note reviewed. Constitutional:       General: She is in acute distress. Appearance: Normal appearance. She is ill-appearing and diaphoretic. HENT:      Head: Normocephalic and atraumatic. Mouth/Throat:      Mouth: Mucous membranes are moist.   Eyes:      General: No scleral icterus. Conjunctiva/sclera: Conjunctivae normal.   Cardiovascular:      Rate and Rhythm: Regular rhythm. Tachycardia present. Comments: Normal peripheral perfusion  Pulmonary:      Effort: Tachypnea, accessory muscle usage and respiratory distress present. Breath sounds: Examination of the right-upper field reveals rhonchi. Examination of the left-upper field reveals rhonchi. Examination of the right-middle field reveals rhonchi. Examination of the left-middle field reveals rhonchi. Examination of the right-lower field reveals rhonchi. Examination of the left-lower field reveals rhonchi. Rhonchi present. Chest:      Comments: Right tunneled dialysis catheter in place. Abdominal:      General: There is no distension. Palpations: Abdomen is soft. Tenderness: There is no abdominal tenderness. Musculoskeletal:         General: No deformity. Normal range of motion. Cervical back: Normal range of motion and neck supple. Skin:     General: Skin is warm. Findings: No rash. Neurological:      General: No focal deficit present. Mental Status: She is alert. Mental status is at baseline. Psychiatric:         Mood and Affect: Mood normal.         Thought Content:  Thought content normal.         Diagnostic Study Results     Labs -     Recent Results (from the past 12 hour(s))   METABOLIC PANEL, COMPREHENSIVE    Collection Time: 10/25/21  9:09 AM   Result Value Ref Range    Sodium 136 136 - 145 mmol/L    Potassium 3.7 3.5 - 5.5 mmol/L    Chloride 103 100 - 111 mmol/L    CO2 27 21 - 32 mmol/L    Anion gap 6 3.0 - 18 mmol/L    Glucose 131 (H) 74 - 99 mg/dL    BUN 14 7.0 - 18 MG/DL    Creatinine 3.04 (H) 0.6 - 1.3 MG/DL    BUN/Creatinine ratio 5 (L) 12 - 20      GFR est AA 19 (L) >60 ml/min/1.73m2    GFR est non-AA 16 (L) >60 ml/min/1.73m2    Calcium 7.5 (L) 8.5 - 10.1 MG/DL    Bilirubin, total 0.3 0.2 - 1.0 MG/DL    ALT (SGPT) 9 (L) 13 - 56 U/L    AST (SGOT) 24 10 - 38 U/L    Alk. phosphatase 79 45 - 117 U/L    Protein, total 6.6 6.4 - 8.2 g/dL    Albumin 2.0 (L) 3.4 - 5.0 g/dL    Globulin 4.6 (H) 2.0 - 4.0 g/dL    A-G Ratio 0.4 (L) 0.8 - 1.7     PHOSPHORUS    Collection Time: 10/25/21  9:09 AM   Result Value Ref Range    Phosphorus 3.9 2.5 - 4.9 MG/DL   CBC WITH AUTOMATED DIFF    Collection Time: 10/25/21 10:35 AM   Result Value Ref Range    WBC 4.0 (L) 4.6 - 13.2 K/uL    RBC 2.57 (L) 4.20 - 5.30 M/uL    HGB 7.4 (L) 12.0 - 16.0 g/dL    HCT 23.7 (L) 35.0 - 45.0 %    MCV 92.2 78.0 - 100.0 FL    MCH 28.8 24.0 - 34.0 PG    MCHC 31.2 31.0 - 37.0 g/dL    RDW 16.3 (H) 11.6 - 14.5 %    PLATELET 115 838 - 380 K/uL    MPV 10.1 9.2 - 11.8 FL    NEUTROPHILS 60 40 - 73 %    LYMPHOCYTES 26 21 - 52 %    MONOCYTES 12 (H) 3 - 10 %    EOSINOPHILS 1 0 - 5 %    BASOPHILS 1 0 - 2 %    ABS. NEUTROPHILS 2.4 1.8 - 8.0 K/UL    ABS. LYMPHOCYTES 1.1 0.9 - 3.6 K/UL    ABS. MONOCYTES 0.5 0.05 - 1.2 K/UL    ABS. EOSINOPHILS 0.0 0.0 - 0.4 K/UL    ABS.  BASOPHILS 0.0 0.0 - 0.1 K/UL    DF AUTOMATED        Labs Reviewed   METABOLIC PANEL, COMPREHENSIVE - Abnormal; Notable for the following components:       Result Value    CO2 20 (*)     Glucose 140 (*)     BUN 26 (*)     Creatinine 5.20 (*)     BUN/Creatinine ratio 5 (*)     GFR est AA 10 (*)     GFR est non-AA 8 (*)     Calcium 7.0 (*)     Bilirubin, total <0.1 (*)     ALT (SGPT) 12 (*)     Albumin 2.3 (*)     Globulin 5.4 (*)     A-G Ratio 0.4 (*)     All other components within normal limits   CBC WITH AUTOMATED DIFF - Abnormal; Notable for the following components:    RBC 3.27 (*)     HGB 9.5 (*)     HCT 32.2 (*)     MCHC 29.5 (*)     RDW 16.4 (*)     NEUTROPHILS 30 (*)     LYMPHOCYTES 53 (*)     EOSINOPHILS 7 (*)     ABS. LYMPHOCYTES 5.6 (*)     ABS. EOSINOPHILS 0.7 (*)     ABS.  BASOPHILS 0.2 (*)     All other components within normal limits   NT-PRO BNP - Abnormal; Notable for the following components:    NT pro-BNP 53,576 (*)     All other components within normal limits   METABOLIC PANEL, COMPREHENSIVE - Abnormal; Notable for the following components:    Glucose 131 (*)     Creatinine 3.04 (*)     BUN/Creatinine ratio 5 (*)     GFR est AA 19 (*)     GFR est non-AA 16 (*)     Calcium 7.5 (*)     ALT (SGPT) 9 (*)     Albumin 2.0 (*)     Globulin 4.6 (*)     A-G Ratio 0.4 (*)     All other components within normal limits   CBC WITH AUTOMATED DIFF - Abnormal; Notable for the following components:    WBC 4.0 (*)     RBC 2.57 (*)     HGB 7.4 (*)     HCT 23.7 (*)     RDW 16.3 (*)     MONOCYTES 12 (*)     All other components within normal limits   POC LACTIC ACID - Abnormal; Notable for the following components:    Lactic Acid (POC) 3.25 (*)     All other components within normal limits   BLOOD GAS, ARTERIAL POC - Abnormal; Notable for the following components:    pH (POC) 7.23 (*)     pCO2 (POC) 52.6 (*)     pO2 (POC) 108 (*)     All other components within normal limits   CULTURE, BLOOD   CULTURE, BLOOD   CARDIAC PANEL,(CK, CKMB & TROPONIN)   MAGNESIUM   HEPATITIS B CORE AB, TOTAL   HEP B SURFACE AG   HEP B SURFACE AB   PHOSPHORUS   URINALYSIS W/ RFLX MICROSCOPIC   BLOOD GAS, ARTERIAL   METABOLIC PANEL, BASIC       Radiologic Studies -   XR CHEST PORT   Final Result      Cardiogenic pulmonary edema        CT Results  (Last 48 hours)    None        CXR Results  (Last 48 hours)               10/24/21 2146  XR CHEST PORT Final result    Impression:      Cardiogenic pulmonary edema       Narrative:  INDICATION: meets SIRS criteria       TECHNIQUE: Portable chest radiograph       COMPARISON: 3 March 2021       FINDINGS:       Right internal jugular tunneled central venous catheter with tip at the proximal   right atrium. Enlarged cardiac silhouette. Pulmonary vascular congestion with increased   interstitial lung markings. No large pleural effusion. No pneumothorax. Chronic deformity of the proximal right humerus. The laboratory results, imaging results, and other diagnostic exams were reviewed in the EMR. Medical Decision Making   I am the first provider for this patient. I reviewed the vital signs, available nursing notes, past medical history, past surgical history, family history and social history. Vital Signs-Reviewed the patient's vital signs. ED EKG interpretation:  Rhythm: sinus tachycardia; and regular . Rate (approx.):132; Axis: normal; P wave: normal; QRS interval: normal ; ST/T wave: normal; Other findings: abnormal ekg. This EKG was interpreted by Uche James MD.        Records Reviewed: Personally, on initial evaluation    MDM:   Katheryn Pope presents with complaint of respiratory distress. DDX includes but is not limited to: Acute volume overload, new CHF exacerbation, COPD exacerbation, new MI    Will obtain lab work and imaging for further evaluation of patients complaint. Will continue to monitor and evaluate patient while in the ED.       Orders as below:  Orders Placed This Encounter    MECHANICAL PROPHYLAXIS IS CONTRAINDICATED Other, please document Already on Anticoagulation    CULTURE, BLOOD    CULTURE, BLOOD    XR CHEST PORT    URINALYSIS W/ RFLX MICROSCOPIC    METABOLIC PANEL, COMPREHENSIVE    CBC WITH AUTOMATED DIFF    CARDIAC PANEL,(CK, CKMB & TROPONIN)    MAGNESIUM    PRO-BNP    METABOLIC PANEL, BASIC    BLOOD GAS, ARTERIAL    METABOLIC PANEL, COMPREHENSIVE    PHOSPHORUS    HEPATITIS B CORE AB, TOTAL    HEP B SURFACE AG    HEP B SURFACE AB    CBC WITH AUTOMATED DIFF    CBC WITH AUTOMATED DIFF    METABOLIC PANEL, BASIC    PHOSPHORUS    PTH INTACT    ADULT DIET Regular; No Salt Added (3-4 gm); Low Potassium (Less than 3000 mg/day); Low Phosphorus (Less than 1000 mg); 60 to 80 gm    POC LACTIC ACID    POC LACTIC ACID    VITAL SIGNS - PER UNIT ROUTINE    STRICT I & O    NEUROLOGIC STATUS ASSESSMENT - PER UNIT ROUTINE    CARDIAC MONITORING    VITAL SIGNS    ACTIVITY AS TOLERATED W/ASSIST    FULL CODE    OT--EVAL, DEVISE PLAN OF CARE AND TREAT    PT--EVAL, DEVISE PLAN OF CARE AND TREAT    NON-INVASIVE POSITIVE PRESSURE VENTILATION    POC LACTIC ACID    BLOOD GAS, ARTERIAL POC    EKG, 12 LEAD, INITIAL    SALINE LOCK IV ONE TIME STAT    HEMODIALYSIS INPATIENT Duration (hr): 3.5; Dialyzer: Revaclear; Dialysate Bath:  K: 2; Dialysate Bath:  CA: 2.5; Sodium Profiling/Modeling: No; Weight Loss (kg): 3; Target Fluid Removal (mL): 3,000; Access: Central Line; Maximum Blood Flow (mL/min. ..    TRANSFER PATIENT    sodium chloride (NS) flush 5-10 mL    vancomycin (VANCOCIN) 1,000 mg in 0.9% sodium chloride 250 mL (VIAL-MATE)    DISCONTD: cefepime (MAXIPIME) 2 g in sterile water (preservative free) 10 mL IV syringe    methylPREDNISolone (PF) (Solu-MEDROL) injection 125 mg    albuterol-ipratropium (DUO-NEB) 2.5 MG-0.5 MG/3 ML    magnesium sulfate 2 g/50 ml IVPB (premix or compounded)    LORazepam (ATIVAN) injection 1 mg    nitroglycerin (NITROBID) 2 % ointment    nitroglycerin (NITROBID) 2 % ointment 1 Inch    nitroglycerin (TRIDIL) 400 mcg/ml infusion    DISCONTD: VANCOMYCIN: Pharmacy to Dose    heparin (porcine) 1,000 unit/mL injection 4,000 Units    morphine injection 2 mg    furosemide (LASIX) injection 40 mg    acetaminophen (TYLENOL) tablet 650 mg    darunavir ethanolate (PREZISTA) 600 mg tablet    dolutegravir (Tivicay) 50 mg tab tablet    esomeprazole (NEXIUM) 40 mg capsule    gabapentin (NEURONTIN) 100 mg capsule    lamiVUDine (EPIVIR) 150 mg tablet    melatonin 3 mg tablet    mirtazapine (REMERON) 15 mg tablet    pyridoxine, vitamin B6, (VITAMIN B-6) 50 mg tablet    QUEtiapine (SEROquel) 25 mg tablet    ritonavir (NORVIR) 80 mg/mL solution    senna (SENOKOT) 8.6 mg tablet    trimethoprim-sulfamethoxazole (BACTRIM, SEPTRA)  mg per tablet    DISCONTD: tenofovir DISOPROXIL FUMARATE (VIREAD) 300 mg tablet    darunavir ethanolate (PREZISTA) tablet 800 mg    dolutegravir (TIVICAY) tablet 50 mg    pantoprazole (PROTONIX) tablet 40 mg    gabapentin (NEURONTIN) capsule 200 mg    lamiVUDine (EPIVIR) tablet 150 mg    melatonin tablet 6 mg    mirtazapine (REMERON) tablet 7.5 mg    pyridoxine (vitamin B6) (VITAMIN B-6) tablet 50 mg    QUEtiapine (SEROquel) tablet 25 mg    ritonavir (NORVIR) tablet 100 mg    senna (SENOKOT) tablet 8.6 mg    sodium chloride (NS) flush 5-40 mL    sodium chloride (NS) flush 5-40 mL    OR Linked Order Group     acetaminophen (TYLENOL) tablet 650 mg     acetaminophen (TYLENOL) suppository 650 mg    polyethylene glycol (MIRALAX) packet 17 g    heparin (porcine) injection 5,000 Units    INITIAL PHYSICIAN ORDER: INPATIENT Medical; Yes; 3. Patient receiving treatment that can only be provided in an inpatient setting (further clarification in H&P documentation)    IP CONSULT TO PHARMACY - VANCOMYCIN DOSING    IP CONSULT TO CASE MANAGEMENT        ED Course:   ED Course as of Oct 25 1450   Sun Oct 24, 2021   2152 Patient initially presented agitated dyspneic in respiratory distress on CPAP per EMS. She was not tolerating CPAP we switched her over to ours initial vital signs were heart rate in the 150s respiratory rate in the low 40s, and satting in the 70s.   After the Pap was started patient sats improved to 100% though patient required Ativan to tolerate BiPAP. Multiple attempts were made to gain IV access and access with a 20-gauge in the right hand and the 18-gauge in the right AC. Nitropaste was applied to the chest patient was started on broad-spectrum IV antibiotics and and nitro drip was started. Patient's vitals improved to heart rate of 129, blood pressure in the 185 systolic though patient remained tachypneic in the 30s. [MA]   2155 Patient's labs are back initial lactic acid 3.25. Troponin was negative, CBC shows a mild anemia to 9.5, blood gas showed acidemia to 7.23 with PCO2 of 52.6 and a PO2 of 108 patient's IPAP was increased to 40. Patient's proBNP came back at 53,576. Bedside ultrasound showed no evidence of pericardial effusion, no wall motion abnormalities, and multiple B-lines throughout the entire lung fields. [MA]   2157 Patient's nephrologist Dr. Deedee Lundborg was determined    [MA]   2157 . Due to this being a [employer]. The on-call nephrologist was paged for emergent dialysis in this patient that appears to be acutely volume he is volume overloaded potentially in acute flash pulmonary edema. [MA]   0682 Spoke to on-call nephrologist who recommended we add patient to his team and called the dialysis nurse to start dialysis tonight. [MA]      ED Course User Index  [MA] Celina Richardson MD     Patient signed out to Dr. Danis Mario at 1000. Procedures:  Procedures        Diagnosis and Disposition     CLINICAL IMPRESSION:  1. Respiratory acidosis    2. Lactic acidosis      Current Discharge Medication List          Critical Care Time:  The services I provided to this patient were to treat and/or prevent clinically significant deterioration that could result in the failure of one or more body systems and/or organ systems due to Pulmonary Edema.     Services included the following:  -reviewing nursing notes and old charts  -vital sign assessments  -direct patient care  -medication orders and management  -interpreting and reviewing diagnostic studies/labs  -re-evaluations  -documentation time    Aggregate critical care time was 60 minutes, which includes only time during which I was engaged in work directly related to the patient's care as described above, whether I was at bedside or elsewhere in the Emergency Department. It did not include time spent performing other reported procedures or the services of residents, students, nurses, or advance practice providers.        Misti Dean MD    9:32 PM

## 2021-10-25 NOTE — ED NOTES
Purposeful rounding completed:    Side rails up x 2:  YES  Bed in low position and wheels locked: YES  Call bell within reach: YES  Comfort addressed: YES    Toileting needs addressed: YES  Plan of care reviewed/updated with patient and or family members: YES  IV site assessed: YES  Pain assessed and addressed: YES, 0    Patient up to bedside commode. No distress observed.

## 2021-10-25 NOTE — DIALYSIS
ACUTE HEMODIALYSIS TREATMENT    HEMODIALYSIS ORDERS: Physician: Dr. Cali Friends: iván   Duration: 3 hr   BFR: 400   DFR: 800   Dialysate:  Temp 36-37*C   K+  3    Ca+ 2.5   Na 138   Bicarb 35   Wt Readings from Last 1 Encounters:   10/24/21 49 kg (108 lb)    Patient Chart [x]   Unable to Obtain []  Dry weight/UF Goal: 3000 ml    Heparin []  Bolus    Units    [] Hourly    Units    [x]None       Pre BP:   138/96   Pulse:  105   Respirations: 17   Temp:  97.4  [] Oral [] Axillary [] Esophageal   Labs: []  Pre  []  Post:   [x] N/A   Additional Orders(medications, blood products, hypotension management): [] Yes   [] No     [x]  [facility] Consent Verified     CATHETER ACCESS:  []N/A   [x]Right   []Left   []IJ   []Fem  [x]Chest wall  []TransHepatic   [] First use X-ray verified     [x]Tunnel    [] Non Tunneled   [x]No S/S infection  []Redness  []Drainage []Cultured []Swelling []Pain   [x]Medical Aseptic Prep Utilized   []Dressing Changed  [] Biopatch  Date:    []Clotted   [x]Patent   Flows: [x]Good  []Poor  []Reversed   If access problem,  notified: []Yes    [x]N/A        GRAFT/FISTULA ACCESS:   [x]N/A     []Right     []Left     []UE     []LE   []AVG   []AVF       [x]Medical Aseptic Prep Utilized   []No S/S infection  []Redness  []Drainage [] Cultured  [] Swelling  [] Pain  Bruit:   [] Strong    [] Weak       Thrill :   [] Strong    [] Weak     Needle Gauge: 15   Length: 1 inch   If access problem,  notified: []Yes     [x]N/A          GENERAL ASSESSMENT:    LUNGS:  Resp Rate 17   [] Clear  [x] Coarse  [] Crackles  [] Wheezing  [] Diminished         Respirations:  []Easy  [x]Labored  []N/A  Cough:  []Productive  []Dry  [x]N/A      Therapy:  []RA  O2 Type:  []NC  Mask: []  NRB    [x] BiPaP  Flow:  l/min                   [] Ventilated  [] Intubated  [] Trach     CARDIAC: [x] Regular      [] Irregular   [] Rhythm:          [] Monitored   [] Bedside   [] Remotely monitored     EDEMA: [] None [x]Generalized  [] Pitting [] 1+   [] 2 +   [] 3+    [] 4+  [] Pedal    SKIN:   [x] Warm  [] Hot     [] Cold   [x] Dry     [] Pale   [] Diaphoretic                  [] Flushed  [] Jaundiced  [] Cyanotic     LOC:    [x] Alert      [x]Oriented:    [x] Person     [] Place  []Time               [] Confused  [] Lethargic  [] Medicated  [] Non-responsive  [] Non Verbal   GI / ABDOMEN:                     [] Flat    [] Distended    [x] Soft    [] Firm   []  Obese                   [] Diarrhea   [] FMS [x] Bowel Sounds  [] Nausea  [] Vomiting                   [] NGT  [] OGT    [] PEG  [] Tube Feedings @     / URINE ASSESSMENT:                   [] Voiding  []  Aragon  [x] Oliguria  [] Anuria                     [] Incontinent  []  Incontinent Brief   []  PureWick     PAIN:  [x] 0 []1  []2   []3   []4   []5   []6   []7   []8   []9   []10                MOBILITY:  [x] Bed    [x] Stretcher      All Vitals and Treatment Details on Attached [address]: [facility]          Room # ER02/02    [] Routine         [] 1st Time Acute/Chronic   [] Urgent      [x] Stat            [] Acute Room   []  Bedside    [] ICU/CCU     [x] ER   Isolation Precautions:  [x] Dialysis    There are currently no Active Isolations     ALLERGIES:     Allergies   Allergen Reactions    Aspirin Hives    Pcn [Penicillins] Hives      Code Status:  No Order     Hepatitis Status      No results found for: HAMAT, HAAB, HABT, HAAT, HBSAG, HBSB, HBSAT, HBABN, HBCM, HBCAB, HBCAT, XBCABS, HBEAB, HBEAG, XHEPCS, 149187, HBEGLT, HBCMLT, HBCLT, HBEBLT, [id number], [id number], HAVMLT, [id number], HBCMLT, [id number], HCGAT     Current Labs:      Lab Results   Component Value Date/Time    WBC 10.4 10/24/2021 08:00 PM    HGB 9.5 (L) 10/24/2021 08:00 PM    HCT 32.2 (L) 10/24/2021 08:00 PM    PLATELET 356 12/25/8387 08:00 PM    MCV 98.5 10/24/2021 08:00 PM     Lab Results   Component Value Date/Time    Sodium 140 10/24/2021 08:00 PM    Potassium 3.8 10/24/2021 08:00 PM Chloride 108 10/24/2021 08:00 PM    CO2 20 (L) 10/24/2021 08:00 PM    Anion gap 12 10/24/2021 08:00 PM    Glucose 140 (H) 10/24/2021 08:00 PM    BUN 26 (H) 10/24/2021 08:00 PM    Creatinine 5.20 (H) 10/24/2021 08:00 PM    BUN/Creatinine ratio 5 (L) 10/24/2021 08:00 PM    GFR est AA 10 (L) 10/24/2021 08:00 PM    GFR est non-AA 8 (L) 10/24/2021 08:00 PM    Calcium 7.0 (L) 10/24/2021 08:00 PM          DIET:  None     PRIMARY NURSE REPORT:   Pre Dialysis: Jose Barr  RN    Time: 4180      EDUCATION:    [x] Patient           Knowledge Basis: [x]None []Minimal [] Substantial [] Unknown  Barriers to learning  []N/A  [] Intubated/Trached/Ventilated  [] Sedated/Paralyzed   [] Access Care     [] S&S of infection  [] Fluid Management  [] K+   [x] Procedural    [] Medications   [] Tx Options   [] Transplant   [] Diet      Teaching Tools:  [x] Explain  [] Demo  [] Handouts [] Video  Patient response: [] Verbalized understanding   [x] Requires follow up        [x] Time Out/Safety Check    [x] Extracorporeal Circuit Tested for integrity       RO/HEMODIALYSIS MACHINE SAFETY CHECKS  Before each treatment:        [facility]                                    [] Unit Machine #  with centralized RO                                    [] Portable Machine #1/RO serial # [id number]                                  [] Portable Machine #2/RO serial # [id number]                                  [] Portable Machine #4/RO serial # [id number]                                  [x] Portable Machine #10/RO serial #  [id number]                                                                                                         Alarm Test:  Pass time 1346            [x] RO/Machine Log Complete    Machine Temp    36-37*C             Dialysate: pH 7.4    Conductivity: Meter 13.8   HD Machine  13.7     TCD: 13.8  Dialyzer Lot # [id number]     Blood Tubing Lot # [id number]     Saline Lot # [id number]     CHLORINE TESTING-Before each treatment and every 4 hours    Total Chlorine: [x] less than 0.1 ppm  Initial Time Check: 2230       4 Hr/2nd Check Time:    (if greater than 0.1 ppm from Primary then every 30 minutes from Secondary)     TREATMENT INITIATION  with Dialysis Precautions:   [x] All Connections Secured              [x] Saline Line Double Clamped   [x] Venous Parameters Set               [x] Arterial Parameters Set    [x] Prime Given 250ml NSS              [x]Air Foam Detector Engaged      Treatment Initiation Note:  Received Patient in the ER bed 2 wearing a CPap mask awake and alert daughter at bedside. Patient  Assessed and stable for treatment. R chest TDC access with no signs or symptoms of complication. Treatment initiated      During Treatment Notes:  1227  Vascular access visible with arterial and venous line connections intact. Pt resting comfortably. 03452  Vascular access visible with arterial and venous line connections intact. Pt resting comfortably. 0000  Vascular access visible with arterial and venous line connections intact. Pt resting comfortably. [id number]  Vascular access visible with arterial and venous line connections intact. Pt resting comfortably. 0030  Vascular access visible with arterial and venous line connections intact. Pt resting comfortably. 0045  Vascular access visible with arterial and venous line connections intact. Pt resting comfortably. 0100  Vascular access visible with arterial and venous line connections intact. Pt resting comfortably. 0115  Vascular access visible with arterial and venous line connections intact. Pt resting comfortably. 0130  Vascular access visible with arterial and venous line connections intact. Pt resting comfortably. 0145  Vascular access visible with arterial and venous line connections intact. Pt resting comfortably. 0200  Dialysis treatment complete.        Medication Dose Volume Route Time Jasmyne Nurse, Title      DAYA Goodson RN HD Fletcher Magnuson DAYA Edwards     Post Assessment  Dialyzer Cleared:   [] Good  [x] Fair  [] Poor  Blood processed:  62.8 L  UF Removed:  3000 Ml    Post /93   Pulse  99 Resp  21  Temp 98 Lungs: [] Clear [x] Course  [] Crackles                 []  Wheezing   [] Diminished   Post Tx Vascular Access: [] N/A  AVF/AVG: Bleeding stopped with  Arterial Pressure for  min   Venous Pressure for  min      Cardiac :[] Regular   [] Irregular   Rhythm:  [x] Monitored   [] Not Monitored    CVC Catheter: [] N/A  Locking solution: Heparin 1:1000 U  Arterial port 1.9 ml   Venous port 2.0 ml   Edema:  [x] None  [] Generalized                     Skin:[x] Warm  [x] Dry [] Diaphoretic               [] Flushed  [] Pale [] Cyanotic Pain:  [x]0  []1 []2  []3 []4  []5  []6  []7 []8    []9  []10     Post Treatment Note:    Patient completed and  Tolerated 3 hrs of hemo dialysis. 3000 ml of fluid removed. Patient remains in the ER in stable condition. Dialysis catheter intact, patent and heplocked as noted above.      POST TREATMENT PRIMARY NURSE HANDOFF REPORT:   Post Dialysis: Payal Pleitez RN               Time:  6241       Abbreviations: AVG-arterial venous graft, AVF-arterial venous fistula, IJ-Internal Jugular, Subcl-Subclavian, Fem-Femoral, Tx-treatment, AP/HR-apical heart rate, VSS- Vital Signs Stable, CVC- Central Venous Catheter, DFR-dialysate flow rate, BFR-blood flow rate, AP-arterial pressure, -venous pressure, UF-ultrafiltrate, TMP-transmembrane pressure, Vic-Venous, Art-Arterial, RO-Reverse Osmosis

## 2021-10-25 NOTE — H&P
History & Physical    Patient: Silvia Davidson MRN: [id number]  CSN: [id number]    YOB: 1960  Age: 64 y.o. Sex: female      DOA: 10/24/2021    Chief Complaint:   Chief Complaint   Patient presents with    Shortness of Breath          HPI:     Silvia Davidson is a 64 y.o.  female with hx of HIV, pulmonary TB, latent syphilis, substance use disorder (distant past), Hep B, Hep C, ESRD on HD (T,R,Sa), schizophrenia. Patient received full run of HD yesterday but presented to [facility] ED secondary to SOB. Per ED records, EMS found her on her front steps in significant distress - tripoding, tachypnea, one-word dyspnea. Patient with HRs in 150s in ED. Started on nitro GTT. Lactate of 3.25. Patient underwent full round of HD overnight. Following dialysis, oxygenation is improved. Saturations in 90s on 2L NC. Of note - in conversation, patient is very pleasant but a bit erratic and difficult to understand/follow. I am unsure of her mental baseline. Patient lives with daughter. I attempted to call for baseline and assistance but no answer at documented phone number. Past Medical History:   Diagnosis Date    Acute kidney injury ([address]     AIDS [facility]ac[facility]     Anemia     Esophageal candidiasis (HCC)     ESRD (end stage renal disease) (HCC)     HCV (hepatitis C virus)     Hepatitis B     Hepatitis C     HIV (human immunodeficiency virus infection) [facility]     HIV (human immunodeficiency virus infection) [facility]     Late latent syphilis     Pulmonary TB     Schizoaffective disorder ([address]     Substance abuse ([facility]        Past Surgical History:   Procedure Laterality Date    HX GYN      HX NEPHRECTOMY      left kidney removed at age 17   [de-identified] NEPHRECTOMY         Patient unable inform on family history.      Social History     Socioeconomic History    Marital status: LEGALLY      Spouse name: Not on file    Number of children: Not on file  Years of education: Not on file    Highest education level: Not on file     Social Determinants of Health     Financial Resource Strain:     Difficulty of Paying Living Expenses:    Food Insecurity:     Worried About Running Out of Food in the Last Year:     [address] in the Last Year:    Transportation Needs:     Lack of Transportation (Medical):  Lack of Transportation (Non-Medical):    Physical Activity:     Days of Exercise per Week:     Minutes of Exercise per Session:    Stress:     Feeling of Stress :    Social Connections:     Frequency of Communication with Friends and Family:     Frequency of Social Gatherings with Friends and Family:     Attends [employer] Services:     Active Member of Clubs or Organizations:     Attends Club or Organization Meetings:     Marital Status:        Prior to Admission medications    Medication Sig Start Date End Date Taking? Authorizing Provider   raltegravir (ISENTRESS) 400 mg tablet Take 400 mg by mouth two (2) times a day. Marilee Zacarias MD   emtricitabine-tenofovir (TRUVADA) 200-300 mg per tablet Take 1 tablet by mouth daily. Marilee Zacarias MD   RISPERIDONE (RISPERDAL PO) Take  by mouth. Marilee Zacarias MD       Allergies   Allergen Reactions    Aspirin Hives    Pcn [Penicillins] Hives         Review of Systems  GENERAL: No fever, chills  HEENT: No  sore throat or sinus congestion. NECK: No pain or stiffness. PULMONARY: No shortness of breath, cough or wheeze. Cardiovascular: no pnd or orthopnea, no CP  GASTROINTESTINAL: No abdominal pain, nausea, vomiting or diarrhea  GENITOURINARY: No dysuria. MUSCULOSKELETAL: No joint or muscle pain  DERMATOLOGIC: No rash, no itching, no lesions.    ENDOCRINE: No polyuria, polydipsia  HEMATOLOGICAL: +anemia   NEUROLOGIC: No headache      Physical Exam:     Physical Exam:  Visit Vitals  /87   Pulse 89   Temp 97.4 °F (36.3 °C) (Axillary)   Resp 17   Ht 5' 1\" (1.549 m)   Wt 49 kg (108 lb)   SpO2 100%   BMI 20.41 kg/m²      O2 Device: BIPAP    Temp (24hrs), Av.6 °F (36.4 °C), Min:97.4 °F (36.3 °C), Max:97.8 °F (36.6 °C)    10/24 1901 - 10/25 0700  In: 320 [I.V.:320]  Out: 3000    No intake/output data recorded. General:  Alert, cooperative, no distress, appears stated age. Head: Normocephalic, without obvious abnormality, atraumatic. Eyes:  Conjunctivae/corneas clear. PERRL, EOMs intact. Nose: Nares normal.    Neck: Supple, symmetrical, trachea midline   Lungs:    tachypnea, diffuse rhonchi bilaterally    Heart:  Tachycardic, regular rhythm, S1, S2 normal.     Abdomen: Soft, non-tender. Nondistended. Extremities:  No edema. Pulses: 2+ and symmetric all extremities. Skin:  No rashes or lesions   Neurologic: AAOx3, No focal motor or sensory deficit. Speech is rambling; patient a bit difficult to understand. Labs Reviewed: All lab results for the last 24 hours reviewed. Recent Results (from the past 24 hour(s))   CULTURE, BLOOD    Collection Time: 10/24/21  8:00 PM    Specimen: Blood   Result Value Ref Range    Special Requests: NO SPECIAL REQUESTS      Culture result: NO GROWTH AFTER 10 HOURS     METABOLIC PANEL, COMPREHENSIVE    Collection Time: 10/24/21  8:00 PM   Result Value Ref Range    Sodium 140 136 - 145 mmol/L    Potassium 3.8 3.5 - 5.5 mmol/L    Chloride 108 100 - 111 mmol/L    CO2 20 (L) 21 - 32 mmol/L    Anion gap 12 3.0 - 18 mmol/L    Glucose 140 (H) 74 - 99 mg/dL    BUN 26 (H) 7.0 - 18 MG/DL    Creatinine 5.20 (H) 0.6 - 1.3 MG/DL    BUN/Creatinine ratio 5 (L) 12 - 20      GFR est AA 10 (L) >60 ml/min/1.73m2    GFR est non-AA 8 (L) >60 ml/min/1.73m2    Calcium 7.0 (L) 8.5 - 10.1 MG/DL    Bilirubin, total <0.1 (L) 0.2 - 1.0 MG/DL    ALT (SGPT) 12 (L) 13 - 56 U/L    AST (SGOT) 37 10 - 38 U/L    Alk.  phosphatase 115 45 - 117 U/L    Protein, total 7.7 6.4 - 8.2 g/dL    Albumin 2.3 (L) 3.4 - 5.0 g/dL    Globulin 5.4 (H) 2.0 - 4.0 g/dL    A-G Ratio 0.4 (L) 0.8 - 1. 7     CBC WITH AUTOMATED DIFF    Collection Time: 10/24/21  8:00 PM   Result Value Ref Range    WBC 10.4 4.6 - 13.2 K/uL    RBC 3.27 (L) 4.20 - 5.30 M/uL    HGB 9.5 (L) 12.0 - 16.0 g/dL    HCT 32.2 (L) 35.0 - 45.0 %    MCV 98.5 78.0 - 100.0 FL    MCH 29.1 24.0 - 34.0 PG    MCHC 29.5 (L) 31.0 - 37.0 g/dL    RDW 16.4 (H) 11.6 - 14.5 %    PLATELET 363 545 - 547 K/uL    MPV 10.4 9.2 - 11.8 FL    NEUTROPHILS 30 (L) 40 - 73 %    LYMPHOCYTES 53 (H) 21 - 52 %    MONOCYTES 8 3 - 10 %    EOSINOPHILS 7 (H) 0 - 5 %    BASOPHILS 2 0 - 2 %    ABS. NEUTROPHILS 3.1 1.8 - 8.0 K/UL    ABS. LYMPHOCYTES 5.6 (H) 0.9 - 3.6 K/UL    ABS. MONOCYTES 0.8 0.05 - 1.2 K/UL    ABS. EOSINOPHILS 0.7 (H) 0.0 - 0.4 K/UL    ABS. BASOPHILS 0.2 (H) 0.0 - 0.1 K/UL    DF MANUAL      PLATELET COMMENTS ADEQUATE PLATELETS      RBC COMMENTS POIKILOCYTOSIS  1+        RBC COMMENTS ANISOCYTOSIS  2+        RBC COMMENTS BRENDAN CELLS  1+        RBC COMMENTS POLYCHROMASIA  1+       CARDIAC PANEL,(CK, CKMB & TROPONIN)    Collection Time: 10/24/21  8:00 PM   Result Value Ref Range    CK - MB 1.5 <3.6 ng/ml    CK-MB Index 0.8 0.0 - 4.0 %     26 - 192 U/L    Troponin-I, QT <0.02 0.0 - 0.045 NG/ML   MAGNESIUM    Collection Time: 10/24/21  8:00 PM   Result Value Ref Range    Magnesium 1.7 1.6 - 2.6 mg/dL   NT-PRO BNP    Collection Time: 10/24/21  8:00 PM   Result Value Ref Range    NT pro-BNP 53,576 (H) 0 - 900 PG/ML   HEP B SURFACE AG    Collection Time: 10/24/21  8:00 PM   Result Value Ref Range    Hepatitis B surface Ag 0.27 <1.00 Index    Hep B surface Ag Interp.  Negative NEG     CULTURE, BLOOD    Collection Time: 10/24/21  8:20 PM    Specimen: Blood   Result Value Ref Range    Special Requests: NO SPECIAL REQUESTS      Culture result: NO GROWTH AFTER 9 HOURS     POC LACTIC ACID    Collection Time: 10/24/21  8:38 PM   Result Value Ref Range    Lactic Acid (POC) 3.25 (HH) 0.40 - 2.00 mmol/L   BLOOD GAS, ARTERIAL POC    Collection Time: 10/24/21  8:47 PM   Result Value Ref Range    Device: BIPAP MASK      FIO2 (POC) 70 %    pH (POC) 7.23 (LL) 7.35 - 7.45      pCO2 (POC) 52.6 (H) 35.0 - 45.0 MMHG    pO2 (POC) 108 (H) 80 - 100 MMHG    HCO3 (POC) 22.2 22 - 26 MMOL/L    sO2 (POC) 97.0 92 - 97 %    Base deficit (POC) 5.5 mmol/L    Mode BIVENT      Set Rate 8 bpm    PEEP/CPAP (POC) 5 cmH2O    PIP (POC) 18      Pressure support 13 cmH2O    Allens test (POC) Positive      Site RIGHT RADIAL      Specimen type (POC) ARTERIAL      Performed by Paola Eli     Critical value read back NICKY    EKG, 12 LEAD, INITIAL    Collection Time: 10/24/21  9:14 PM   Result Value Ref Range    Ventricular Rate 132 BPM    Atrial Rate 132 BPM    P-R Interval 112 ms    QRS Duration 62 ms    Q-T Interval 384 ms    QTC Calculation (Bezet) 568 ms    Calculated R Axis 2 degrees    Calculated T Axis 87 degrees    Diagnosis       Critical Test Result: Long QTc  Sinus tachycardia  Inferior infarct , age undetermined  Anterior infarct , age undetermined  Abnormal ECG  When compared with ECG of 03-MAR-2021 23:31,  Significant changes have occurred          XR Results (most recent):  Results from Hospital Encounter encounter on 03/03/21    XR CHEST PORT    Narrative  EXAM: XR CHEST PORT    INDICATION: shortness of breath    COMPARISON: March 2015    FINDINGS: A portable AP radiograph of the chest was obtained at 2237 hours. The  patient is on a cardiac monitor. Question mild interstitial opacities. . Stable  cardiac silhouette. .  No definite acute bone findings. Right-sided dual-lumen  catheter has been placed with tip at the atriocaval junction. No visualized  pneumothorax. .    Impression  Question mild bilateral interstitial opacities. Follow-up can be obtained. Interval placement of right-sided catheter. No visualized pneumothorax.         CT Results  (Last 48 hours)    None            Procedures/imaging: see electronic medical records for all procedures/Xrays and details which were not copied into this note but were reviewed prior to creation of Plan      Assessment/Plan     Active Problems:    Hypoxia (10/25/2021)       Assessment  1. Sepsis   2. Acute hypoxic, hypercarbic respiratory failure  3. Flash pulmonary Edema  4. ESRD on HD (T,R,Sa)  5. Respiratory acidosis    #1-5: admit. Received HD with improvement in O2 status. On 2L NC. May need additional run of dialysis. Nephrology following. Currently on nitro GTT secondary to tachycardia; will continue for now. Broad spectrum ABs started in ED given lactate of 3.25. Lactic acidosis likely secondary to pulmonary status. Patient does not appear acutely infected. Will d/c antibiotics. 5.   Lactic Acidosis   6. HIV  7. Hepatitis B per record  8. Hepatitis C per record   9. Schizophrenia    #6-9: continue home meds. Could not reach family for medication reconciliation. Restarted meds based on patient's last [facility] encounter. 10.  QTc prolongation       Diet: renal   DVT/GI Prophylaxis: Hep SQ  Code Status: full        Discussed with patient at bedside about hospital admission and plan care. He understands and agrees. All questions answered. Disclaimer: Sections of this note are dictated using utilizing voice recognition software. Minor typographical errors may be present. If questions arise, please do not hesitate to contact me or call our department.         Helder Coombs, [facility]

## 2021-10-25 NOTE — ED NOTES
Pt finished Dialysis. Dialysis nurse reports removing 3 L and states pt tolerated procedure well, ER Physician aware. Pt continues on cardiac monitor and Nitro drip per MAR, no other changes will continue to monitor.

## 2021-10-25 NOTE — PROGRESS NOTES
ESRD patient presented with Acute Respiratory Failure, Volume overload despite getting her full treatment yesterday, has HIV & multiple other complex medical problems. Will need Urgent dialysis tonight in ER & will take off 3 kg.  .  Discussed with on call dialysis nurse & dialysis orders given

## 2021-10-25 NOTE — REMOTE MONITORING
Spoke with primary RN Elias Juarez regarding 6 hour Sepsis bundle. For any questions or concerns, please feel free to call [phone]. Thank you! [address] [facility]. Anni Wallace RN, BSN.

## 2021-10-25 NOTE — ED NOTES
Spoke with Dr. Juan Diego Estrada, patient endorses seeing and hearing people that are not there: denies command hallucinations. Endorses that she is itchy all over. Hemoglobin was 9.5 on 10/24/21 at 2000 and on 10/25/21 at 1035 hemoglobin is 7.4.

## 2021-10-26 LAB
ANION GAP SERPL CALC-SCNC: 9 MMOL/L (ref 3–18)
BASOPHILS # BLD: 0 K/UL (ref 0–0.1)
BASOPHILS NFR BLD: 1 % (ref 0–2)
BUN SERPL-MCNC: 37 MG/DL (ref 7–18)
BUN/CREAT SERPL: 8 (ref 12–20)
CALCIUM SERPL-MCNC: 7.6 MG/DL (ref 8.5–10.1)
CALCIUM SERPL-MCNC: 7.7 MG/DL (ref 8.5–10.1)
CHLORIDE SERPL-SCNC: 105 MMOL/L (ref 100–111)
CO2 SERPL-SCNC: 24 MMOL/L (ref 21–32)
CREAT SERPL-MCNC: 4.92 MG/DL (ref 0.6–1.3)
DIFFERENTIAL METHOD BLD: ABNORMAL
EOSINOPHIL # BLD: 0.2 K/UL (ref 0–0.4)
EOSINOPHIL NFR BLD: 4 % (ref 0–5)
ERYTHROCYTE [DISTWIDTH] IN BLOOD BY AUTOMATED COUNT: 16.7 % (ref 11.6–14.5)
GLUCOSE SERPL-MCNC: 104 MG/DL (ref 74–99)
HBV CORE AB SERPL QL IA: POSITIVE
HCT VFR BLD AUTO: 23.9 % (ref 35–45)
HGB BLD-MCNC: 7.3 G/DL (ref 12–16)
LYMPHOCYTES # BLD: 0.8 K/UL (ref 0.9–3.6)
LYMPHOCYTES NFR BLD: 19 % (ref 21–52)
MCH RBC QN AUTO: 29 PG (ref 24–34)
MCHC RBC AUTO-ENTMCNC: 30.5 G/DL (ref 31–37)
MCV RBC AUTO: 94.8 FL (ref 78–100)
MONOCYTES # BLD: 0.5 K/UL (ref 0.05–1.2)
MONOCYTES NFR BLD: 11 % (ref 3–10)
NEUTS SEG # BLD: 2.9 K/UL (ref 1.8–8)
NEUTS SEG NFR BLD: 65 % (ref 40–73)
PHOSPHATE SERPL-MCNC: 4.3 MG/DL (ref 2.5–4.9)
PLATELET # BLD AUTO: 299 K/UL (ref 135–420)
PMV BLD AUTO: 10.1 FL (ref 9.2–11.8)
POTASSIUM SERPL-SCNC: 4 MMOL/L (ref 3.5–5.5)
PTH-INTACT SERPL-MCNC: 1135.7 PG/ML (ref 18.4–88)
RBC # BLD AUTO: 2.52 M/UL (ref 4.2–5.3)
SODIUM SERPL-SCNC: 138 MMOL/L (ref 136–145)
WBC # BLD AUTO: 4.5 K/UL (ref 4.6–13.2)

## 2021-10-26 PROCEDURE — 74011250636 HC RX REV CODE- 250/636: Performed by: STUDENT IN AN ORGANIZED HEALTH CARE EDUCATION/TRAINING PROGRAM

## 2021-10-26 PROCEDURE — 74011250637 HC RX REV CODE- 250/637: Performed by: FAMILY MEDICINE

## 2021-10-26 PROCEDURE — 99232 SBSQ HOSP IP/OBS MODERATE 35: CPT | Performed by: FAMILY MEDICINE

## 2021-10-26 PROCEDURE — 2709999900 HC NON-CHARGEABLE SUPPLY

## 2021-10-26 PROCEDURE — 74011250636 HC RX REV CODE- 250/636: Performed by: INTERNAL MEDICINE

## 2021-10-26 PROCEDURE — 65660000000 HC RM CCU STEPDOWN

## 2021-10-26 PROCEDURE — 36415 COLL VENOUS BLD VENIPUNCTURE: CPT

## 2021-10-26 PROCEDURE — 80048 BASIC METABOLIC PNL TOTAL CA: CPT

## 2021-10-26 PROCEDURE — 90935 HEMODIALYSIS ONE EVALUATION: CPT

## 2021-10-26 PROCEDURE — 84100 ASSAY OF PHOSPHORUS: CPT

## 2021-10-26 PROCEDURE — 99218 HC RM OBSERVATION: CPT

## 2021-10-26 PROCEDURE — 96372 THER/PROPH/DIAG INJ SC/IM: CPT

## 2021-10-26 PROCEDURE — 85025 COMPLETE CBC W/AUTO DIFF WBC: CPT

## 2021-10-26 PROCEDURE — 83970 ASSAY OF PARATHORMONE: CPT

## 2021-10-26 PROCEDURE — 74011250637 HC RX REV CODE- 250/637: Performed by: STUDENT IN AN ORGANIZED HEALTH CARE EDUCATION/TRAINING PROGRAM

## 2021-10-26 RX ORDER — HEPARIN SODIUM 1000 [USP'U]/ML
INJECTION, SOLUTION INTRAVENOUS; SUBCUTANEOUS
Status: DISPENSED
Start: 2021-10-26 | End: 2021-10-26

## 2021-10-26 RX ORDER — DOXERCALCIFEROL 4 UG/2ML
2 INJECTION INTRAVENOUS
Status: DISCONTINUED | OUTPATIENT
Start: 2021-10-26 | End: 2021-10-29 | Stop reason: HOSPADM

## 2021-10-26 RX ADMIN — Medication 6 MG: at 22:53

## 2021-10-26 RX ADMIN — RITONAVIR 100 MG: 100 TABLET, FILM COATED ORAL at 09:03

## 2021-10-26 RX ADMIN — HEPARIN SODIUM 4000 UNITS: 1000 INJECTION, SOLUTION INTRAVENOUS; SUBCUTANEOUS at 13:00

## 2021-10-26 RX ADMIN — MIRTAZAPINE 7.5 MG: 15 TABLET, FILM COATED ORAL at 22:52

## 2021-10-26 RX ADMIN — GABAPENTIN 200 MG: 100 CAPSULE ORAL at 22:52

## 2021-10-26 RX ADMIN — EPOETIN ALFA-EPBX 10000 UNITS: 10000 INJECTION, SOLUTION INTRAVENOUS; SUBCUTANEOUS at 22:53

## 2021-10-26 RX ADMIN — DARUNAVIR 800 MG: 800 TABLET, FILM COATED ORAL at 09:03

## 2021-10-26 RX ADMIN — DIPHENHYDRAMINE HYDROCHLORIDE 50 MG: 25 CAPSULE ORAL at 04:20

## 2021-10-26 RX ADMIN — PYRIDOXINE HCL TAB 50 MG 50 MG: 50 TAB at 09:03

## 2021-10-26 RX ADMIN — SENNOSIDES 8.6 MG: 8.6 TABLET, COATED ORAL at 09:03

## 2021-10-26 RX ADMIN — HEPARIN SODIUM 5000 UNITS: 5000 INJECTION INTRAVENOUS; SUBCUTANEOUS at 22:53

## 2021-10-26 RX ADMIN — Medication 10 ML: at 23:01

## 2021-10-26 RX ADMIN — DOXERCALCIFEROL 2 MCG: 4 INJECTION, SOLUTION INTRAVENOUS at 18:45

## 2021-10-26 RX ADMIN — PANTOPRAZOLE SODIUM 40 MG: 20 TABLET, DELAYED RELEASE ORAL at 09:03

## 2021-10-26 RX ADMIN — DOLUTEGRAVIR SODIUM 50 MG: 50 TABLET, FILM COATED ORAL at 09:00

## 2021-10-26 RX ADMIN — LAMIVUDINE 150 MG: 150 TABLET, FILM COATED ORAL at 09:03

## 2021-10-26 RX ADMIN — QUETIAPINE FUMARATE 25 MG: 25 TABLET ORAL at 22:53

## 2021-10-26 NOTE — PROGRESS NOTES
Patient has Hectorol ordered intravenously. Informed that we do not give that on the unit IV. Called Dialysis and inquired about administration since it was not given while patient was in Dialysis. Dialysis stated they will come to unit and administer.

## 2021-10-26 NOTE — ED NOTES
Patient has intermittent periods of restlessness and disconnects herself from the monitor. Explained to the patient that we need to keep the monitor so we can care for her. Patient currently resting with the monitor in place.

## 2021-10-26 NOTE — ROUTINE PROCESS
OT order received and chart reviewed. Pt is currently off the floor for dialysis (10:53). Second attempt 13:56 with pt continuing to be off the floor. Will f/u later as pt's schedule allows.  Thank you, Rishi Gray OTR

## 2021-10-26 NOTE — DIALYSIS
ACUTE HEMODIALYSIS FLOW SHEET    HEMODIALYSIS ORDERS: Physician: Dr. Bradshaw [place]elle: Saman   Duration: 3.5 hr  BFR: 400   DFR: 800   Dialysate:  Temp 36   K+   2    Ca+  2.5   Na 138   Bicarb 35   Wt Readings from Last 1 Encounters:   10/24/21 49 kg (108 lb)    Patient Chart [x]   Unable to Obtain []  Dry weight/UF Goal: 3000 ml   Heparin [x]  Bolus 1500   Units    [x] Hourly 500   Units    []None       Pre BP:   154/106    Pulse:  90   Respirations: 19    Temperature:  98.1  [] Oral [] Axillary  Tx: NSS   ml/Bolus   [x] N/A   Labs: []  Pre  []  Post:   [x] N/A   Additional Orders(medications, blood products, hypotension management): [] Yes   [x] No     [x]  [facility] Consent Verified     CATHETER ACCESS:  []N/A   [x]Right   []Left   []IJ     []Fem   [x]Chest wall   [] First use X-ray verified     [x]Tunnel    [] Non Tunneled   [x]No S/S infection  []Redness  []Drainage []Cultured []Swelling []Pain   [x]Medical Aseptic Prep Utilized   [x]Dressing Changed  [x] Biopatch  Date: 10/26/21   []Clotted   [x]Patent   Flows: [x]Good  []Poor  []Reversed   If access problem,  notified: []Yes    [x]N/A        GENERAL ASSESSMENT:    LUNGS:   SaO2%      [x] Clear  [] Coarse  [] Crackles  [] Wheezing                                                [] Diminished     Location : []RLL   []LLL    []RUL  []PAYAL   Cough: []Productive  []Dry  [x]N/A   Respirations:  [x]Easy  []Labored   Therapy:  [x]RA  []NC l/min    Mask: []NRB  [] Venti    O2%                  []Ventilator  []Intubated  [] Jessica Stager  [] BiPaP   CARDIAC: [x]Regular      [] Irregular   [] Pericardial Rub  [] JVD          []  Monitored  [] Bedside  [] Remotely monitored     EDEMA: [x] None  []Generalized  [] Pitting [] 1    [] 2    [] 3    [] 4                 [] Facial  [] Pedal  []  UE  [] LE   SKIN:   [x] Warm  [] Hot     [] Cold   [x] Dry     [] Pale   [] Diaphoretic                  [] Flushed  [] Jaundiced  [] Cyanotic  [] Rash  [] Weeping   LOC: [x] Alert      [x]Oriented:    [x] Person     [x] Place  [x]Time               [] Confused  [] Lethargic  [] Medicated  [] Non-responsive  [] Non-Verbal   GI / ABDOMEN:                     [] Flat    [] Distended    [x] Soft    [] Firm   []  Obese                   [] Diarrhea  [x] Bowel Sounds  [] Nausea  [] Vomiting     / URINE ASSESSMENT:                   [] Voiding   [x] Oliguria  [] Anuria   []  Aragon                  [] Incontinent  []  Incontinent Brief []  Fecal Management System     PAIN:  [x] 0 []1  []2   []3   []4   []5   []6   []7   []8   []9   []10            Scale 0-10  Action/Follow Up:    MOBILITY:  [x] Bed    [] Stretcher      All Vitals and Treatment Details on Attached [email or web address]: [facility]          Room # ER02/02   [] 1st Time Acute      [] Stat       [x] Routine      [] Urgent     [x] Acute Room  []  Bedside  [] ICU/CCU  [] ER   Isolation Precautions:  [x] Dialysis    There are currently no Active Isolations       ALLERGIES:     Allergies   Allergen Reactions    Aspirin Hives    Pcn [Penicillins] Hives      Code Status:  Full Code     Hepatitis Status     Lab Results   Component Value Date/Time    Hepatitis B surface Ag 0.27 10/24/2021 08:00 PM    Hepatitis B surface Ab 20.72 10/24/2021 08:00 PM    Hep B Core Ab, total Positive (A) 10/24/2021 08:00 PM        Current Labs:      Lab Results   Component Value Date/Time    WBC 4.5 (L) 10/26/2021 04:25 AM    HGB 7.3 (L) 10/26/2021 04:25 AM    HCT 23.9 (L) 10/26/2021 04:25 AM    PLATELET 009 51/65/9238 04:25 AM    MCV 94.8 10/26/2021 04:25 AM     Lab Results   Component Value Date/Time    Sodium 138 10/26/2021 04:25 AM    Potassium 4.0 10/26/2021 04:25 AM    Chloride 105 10/26/2021 04:25 AM    CO2 24 10/26/2021 04:25 AM    Anion gap 9 10/26/2021 04:25 AM    Glucose 104 (H) 10/26/2021 04:25 AM    BUN 37 (H) 10/26/2021 04:25 AM    Creatinine 4.92 (H) 10/26/2021 04:25 AM    BUN/Creatinine ratio 8 (L) 10/26/2021 04:25 AM    GFR est AA 11 (L) 10/26/2021 04:25 AM    GFR est non-AA 9 (L) 10/26/2021 04:25 AM    Calcium 7.6 (L) 10/26/2021 04:25 AM    Calcium 7.7 (L) 10/26/2021 04:25 AM          DIET:  DIET ADULT      PRIMARY NURSE REPORT:   Pre Dialysis: DEANNA Acosta RN     Time: 46        EDUCATION:    [x] Patient [] Other           Knowledge Basis: []None [x]Minimal [] Substantial []Not able to assess at this time  Barriers to learning  [x]N/A  []Intubated/Ventilated  []Sedated   [] Access Care     [] S&S of infection  [] Fluid Management  [] K+   [x] Procedural    []Albumin   [] Medications   [] Tx Options   [] Transplant   [] Diet   [] Other   Teaching Tools:  [x] Explain  [] Demo  [] Handouts [] Video  Patient response: [x] Verbalized understanding  [] Teach back  [] Return demonstration   [] Requires follow up      [x]Time Out/Safety Check  [x] Extracorporeal Circuit Tested for integrity       RO/HEMODIALYSIS MACHINE SAFETY CHECKS  Before each treatment:     Machine Number:                   [address]                                    [x] Unit Machine # 7 with centralized RO                                                                          Alarm Test:  Pass time 8347            [x] RO/Machine Log Complete    Machine Temp    36.0             Dialysate: pH  7.4    Conductivity: Meter 13.7     HD Machine  13.8      TCD: 13.7  Dialyzer Lot # [id number]     Blood Tubing Lot # [id number]   Saline Lot # [id number]     CHLORINE TESTING-Before each treatment and every 4 hours    Total Chlorine: [x] less than 0.1 ppm  Initial Time Check: 0900       4 Hr/2nd Check Time:    (if greater than 0.1 ppm from Primary then every 30 minutes from Secondary)     TREATMENT INITIATION  with Dialysis Precautions:   [x] All Connections Secured              [x] Saline Line Double Clamped   [x] Venous Parameters Set               [x] Arterial Parameters Set    [x] Prime Given 250ml NSS              [x]Air Foam Detector Engaged      Treatment Initiation Note:  3935; Arrived at pt's room, pt denies any pain or SOB. Pt A & O  X 3, follows commands, no distress noted on RA, though has episodes on confusion on and off. VSS. Rt chest wall CVC assessed no abnormalities noted, line patent with good flow. CVC accessed without any difficulty. 7394;  Time out performed per policy, HD commenced, pt tolerated well. During Treatment Notes:  0930;HD in good progress;VSS. Vascular access visible with arterial and venous line connections intact. 1000;HD in good progress,VSS. Vascular access visible with arterial and venous line connections intact. 10;30;HD in good progress,VSS. Vascular access visible with arterial and venous line connections intact. 1100;HD in good progress,VSS. Vascular access visible with arterial and venous line connections intact. 1130;HD in good progress,VSS. Vascular access visible with arterial and venous line connections intact. 1200;HD in good progress, VSS. Vascular access visible with arterial and venous line connections intact. 1230;HD in good progress, VSS. Vascular access visible with arterial and venous line connections intact. 1256; Dialysis treatment completed. Medication Dose Volume Route Time [facility] Nurse, Title   None     JOANA Mckeon RN     Post Assessment  Dialyzer Cleared:[] Good [x] Fair  [] Poor  Blood processed:  77.1 L  UF Removed:  3000 Ml  Post /83  Pulse  83 Resp  18   Temp 98  [x] Oral [] Axillary      CVC Catheter: [] N/A  Locking solution: Heparin 1:1000 U  Arterial port 1.9 ml   Venous port 2.0ml         Skin:[x] Warm  [x] Dry [] Diaphoretic               [] Flushed  [] Pale [] Cyanotic   Pain:  [x]0  []1 []2  []3 []4  []5  []6 []7 []8  []9  []10       Post Treatment Note:   1315;VSS. Dialysis catheter intact, patent and heplocked accordingly, Dressing clean, dry and intact. POST TREATMENT PRIMARY NURSE HANDOFF REPORT:   Post Dialysis: DEANNA Acosta RN                Time:  1320       Abbreviations: AVG-arterial venous graft, AVF-arterial venous fistula, IJ-Internal Jugular, Subcl-Subclavian, Fem-Femoral, Tx-treatment, AP/HR-apical heart rate, DFR-dialysate flow rate, BFR-blood flow rate, AP-arterial pressure, -venous pressure, UF-ultrafiltrate, TMP-transmembrane pressure, Vic-Venous, Art-Arterial, RO-Reverse Osmosis

## 2021-10-26 NOTE — PROGRESS NOTES
PT orders received and chart reviewed. PT eval attempted at 1031. Pt currently off floor at dialysis. Will follow up. 2nd attempt at [phone], pt continues to be off floor. Will continue to follow.      Thank you for this referral.   Evon Dorantes PT DPT

## 2021-10-26 NOTE — PROGRESS NOTES
[facility] Hospitalists  Progress Note    Patient: Ashley Cope Age: 64 y.o. : 1960 MR#: [id number] SSN: [id number]  Date: 10/26/2021     Subjective/24-hour events:     Just recently arrived to floor from ED. Assessment:   Acute respiratory failure with hypoxia and hypercapnea  ESRD  Volume overload/flash pulmonary edema  Lactic acidosis  Anemia of chronic disease  HIV  Schizophrenia  Hx Hep B and C  Hx poor compliance    Plan:   HD per nephrology. Antiretroviral therapy as ordered. PT/OT, disposition TBD. Patient moved to the area from [place] a few months ago and has yet to establish care with a physician of any sort. She also states that she has not been taking any of her medications recently because she ran out. We will need to ensure that outpatient hemodialysis is arranged as patient states to me that she plans to remain in the area with her daughter. Will also need to likely work on converting her [place] to [place]. Will discuss with care management. Case discussed with:  [x]Patient  []Family  [x]Nursing  []Case Management  DVT Prophylaxis:  []Lovenox  []Hep SQ  []SCDs  []Coumadin   []On Heparin gtt    Objective:   VS:   Visit Vitals  /83   Pulse 83   Temp 98 °F (36.7 °C) (Oral)   Resp 18   Ht 5' 1\" (1.549 m)   Wt 49 kg (108 lb)   SpO2 97%   BMI 20.41 kg/m²      Tmax/24hrs: Temp (24hrs), Av.1 °F (36.7 °C), Min:98 °F (36.7 °C), Max:98.1 °F (36.7 °C)      Intake/Output Summary (Last 24 hours) at 10/26/2021 1507  Last data filed at 10/26/2021 1300  Gross per 24 hour   Intake 6 ml   Output 3000 ml   Net -2994 ml       General: Chronically ill-appearing but nontoxic. In NAD. Cardiovascular:  RRR. Pulmonary:  Lungs clear bilaterally, no wheezes. GI:  Abdomen soft, NTTP. Extremities:  Warm, no edema or ischemia. Neuro:  Awake and alert. Moves extremities spontaneously.     Labs:    Recent Results (from the past 24 hour(s)) HGB & HCT    Collection Time: 10/25/21  6:30 PM   Result Value Ref Range    HGB 8.2 (L) 12.0 - 16.0 g/dL    HCT 26.3 (L) 35.0 - 97.4 %   METABOLIC PANEL, BASIC    Collection Time: 10/26/21  4:25 AM   Result Value Ref Range    Sodium 138 136 - 145 mmol/L    Potassium 4.0 3.5 - 5.5 mmol/L    Chloride 105 100 - 111 mmol/L    CO2 24 21 - 32 mmol/L    Anion gap 9 3.0 - 18 mmol/L    Glucose 104 (H) 74 - 99 mg/dL    BUN 37 (H) 7.0 - 18 MG/DL    Creatinine 4.92 (H) 0.6 - 1.3 MG/DL    BUN/Creatinine ratio 8 (L) 12 - 20      GFR est AA 11 (L) >60 ml/min/1.73m2    GFR est non-AA 9 (L) >60 ml/min/1.73m2    Calcium 7.6 (L) 8.5 - 10.1 MG/DL   CBC WITH AUTOMATED DIFF    Collection Time: 10/26/21  4:25 AM   Result Value Ref Range    WBC 4.5 (L) 4.6 - 13.2 K/uL    RBC 2.52 (L) 4.20 - 5.30 M/uL    HGB 7.3 (L) 12.0 - 16.0 g/dL    HCT 23.9 (L) 35.0 - 45.0 %    MCV 94.8 78.0 - 100.0 FL    MCH 29.0 24.0 - 34.0 PG    MCHC 30.5 (L) 31.0 - 37.0 g/dL    RDW 16.7 (H) 11.6 - 14.5 %    PLATELET 256 800 - 216 K/uL    MPV 10.1 9.2 - 11.8 FL    NEUTROPHILS 65 40 - 73 %    LYMPHOCYTES 19 (L) 21 - 52 %    MONOCYTES 11 (H) 3 - 10 %    EOSINOPHILS 4 0 - 5 %    BASOPHILS 1 0 - 2 %    ABS. NEUTROPHILS 2.9 1.8 - 8.0 K/UL    ABS. LYMPHOCYTES 0.8 (L) 0.9 - 3.6 K/UL    ABS. MONOCYTES 0.5 0.05 - 1.2 K/UL    ABS. EOSINOPHILS 0.2 0.0 - 0.4 K/UL    ABS.  BASOPHILS 0.0 0.0 - 0.1 K/UL    DF AUTOMATED     PHOSPHORUS    Collection Time: 10/26/21  4:25 AM   Result Value Ref Range    Phosphorus 4.3 2.5 - 4.9 MG/DL   PTH INTACT    Collection Time: 10/26/21  4:25 AM   Result Value Ref Range    Calcium 7.7 (L) 8.5 - 10.1 MG/DL    PTH, Intact 1,135.7 (H) 18.4 - 88.0 pg/mL       Signed By: Yulissa Gonzalez MD     October 26, 2021

## 2021-10-26 NOTE — ED NOTES
Bedside, Verbal and Written shift change report given to Karla and Peng [address] RN (oncoming nurse) by Quincy STAPLETON(offgoing nurse). Report included the following information SBAR, Kardex, and MAR. Hourly rounding and  chart checks completed.

## 2021-10-26 NOTE — CONSULTS
[address] [facility]    Name:  Dutch Martin  MR#:   [id number]  :  1960  ACCOUNT #:  [de-identified]  DATE OF SERVICE:  10/24/2021      RENAL CONSULTATION    Consult was requested by the medical emergency department. REASON FOR CONSULTATION:  The patient needs urgent dialysis. HISTORY OF PRESENT ILLNESS:  This 62-year-old [facility] American female whom I have not encountered at all before this consultation came to the emergency room with acute shortness of breath. The patient usually gets dialysis every Tuesday, Thursday, and Saturday somewhere in the [facility], but not under my care. Further inquiry with her found that the patient recently [facility] [place] to stay with her daughter who lives in [place] but gets her dialysis at [facility] for the time being. She claims that she is here transiently and going to go back to [facility] soon. The patient is a very poor historian, does not like to communicate, has extensive medical problems including HIV, old pulmonary tuberculosis, late latent syphilis, substance abuse, and ESRD. She was fully dialyzed on Saturday, but the patient was having shortness of breath and was brought by the EMS for this reason. Per EMS she was found sitting on her front steps reporting tachypneic and one-word dyspneic. They tried to support her respiration with a CPAP but she was unable to tolerate it. They also tried to give her albuterol treatment, but not was able to tolerate it. The patient's oxygen saturation was in the 70% without any CPAP. As per the patient's daughter, the patient's symptoms were acute starting today and she is a patient who struggles with her volume status and fluid intake. Her cardiac status is not known. REVIEW OF SYSTEMS:  Not possible given the patient's mental condition as well as shortness of breath and current clinical situation.     LIST OF MEDICATIONS:  Are as far as, Darunavir 800 mg daily with breakfast also dolutegravir 50 mg tablet daily, Protonix 40 mg tablet daily, Neurontin 200 mg every night at bedtime, Epivir 150 mg tablet daily, meropenem 6 mg tablet daily p.r.n. for insomnia, Remeron 7.5 mg every night at bedtime, pyridoxine 50 mg tablet daily, Seroquel 25 mg tablet every night at bedtime, ritonavir 100 mg capsule daily with breakfast, Senokot 8.6 mg tablet daily p.r.n. for constipation, Tylenol 650 mg 1 tablet every 6 hours p.r.n. for pain, polyethylene glycol MiraLax 17 g p.o. daily, and heparin 5000 units every 8 hours. PAST MEDICAL HISTORY:  As I mentioned HIV, AIDS, anemia, esophageal candidiasis, hepatitis C, hepatitis B, latent syphilis, pulmonary TB, schizoaffective disorder, and substance abuse. PAST SURGICAL HISTORY:  Has some GYN history, not sure exactly, also as per the chart has nephrectomy and left kidney removal at age 17, reason not clear. The patient cannot say anything. FAMILY HISTORY:  Not available. SOCIAL HISTORY: Smoking history, alcohol history, and drug history is not available. Not on the chart. ALLERGIES:  TO PENICILLIN. PHYSICAL EXAMINATION:  VITAL SIGNS:  Temperature not recorded, respirations 20, heart rate 92, blood pressure was on the high side on admission 163/102, 239/148 and heart rate was quite fast varying between 150-160 per minute, respiration was lying between 28-34 per minute, weight is 49 kg and oxygen saturation was % on BiPAP. HEENT:  Oral mucosa. Poor dentition. Unhealthy looking oral cavity. NECK:  Jugular venous pressure elevated. LUNGS:  Decreased breath sounds with diminished breath sounds as well as crackles. HEART:  Mildly tachycardic. ABDOMEN:  Scaphoid. No palpable mass. EXTREMITIES:  Ankle, trace edema. RELEVANT LABS:  On the day of admission, WBC of 10.4, hemoglobin of 9.5, hematocrit of 32.2, platelets of 065,137 with a neutrophil of 30%, lymphocyte of 53%.   Chemistry on the day of admission sodium 140, potassium 3.8, chloride 108, CO2 20, glucose of 140, blood urea nitrogen 26, creatinine of 5.20, total protein of 7.7 with albumin of 2.3. BNP of 53,576. A blood gas was done, pH of 7.23, pCO2 of 52.6, pO2 of 100, bicarb of 22.2, oxygen saturation of 97%. Blood cultures so far negative. A chest x-ray was done on the day of admission, shows cardiogenic pulmonary edema with a right tunneled dialysis catheter in the right IJ with the tip at the proximal right atrium. IMPRESSION:  1. End stage renal disease. 2.  Signs of volume overload. 3.  Human immunodeficiency virus and acquired immune deficiency syndrome. 4.  Anemia. 5.  History of substance abuse. 6.  Schizoaffective disorder. PLAN:  The patient needed urgent dialysis, for this reason, went to call the dialysis nurse on-call and arranged the dialysis on urgent basis to relieve her symptoms and take excessive fluid out. Her blood pressure was on the high side, responded nicely with the nitro drip and subsequently blood pressure is now in the usual range, nitro drip should be discontinued. The patient will be admitted under the medical service of the hospitalist and she will be maintained on her regular dialysis schedule every Tuesday, Thursday, and Saturday. She will be given Retacrit injections as well as her PTH will be checked and depending on the PTH level, we will decide about her Hectorol dose. Further recommendations will be given based on the hospital course.       MD AMALIA [email or web address]  D:  10/25/2021 22:42  T:  10/26/2021 5:35  JOB #:  [id number]

## 2021-10-26 NOTE — PROGRESS NOTES
Progress Note    Sha Morales  64 y.o. Admit Date: 10/24/2021  Active Problems:    Hypoxia (10/25/2021) POA: Unknown      ESRD (end stage renal disease) [facility] dialysis [facility] (10/25/2021) POA: Unknown      Volume overload (10/25/2021) POA: Unknown      Hypertension (10/25/2021) POA: Unknown      Respiratory failure ([facility] (10/25/2021) POA: Unknown            Subjective:     Patient seen during dialysis, Non communicative,does not  Want to talk. Apprently no SOB. A comprehensive review of systems was negative except for that written in the History of Present Illness.     Objective:     Visit Vitals  BP (!) 143/99   Pulse 84   Temp 98.5 °F (36.9 °C)   Resp 16   Ht 5' 1\" (1.549 m)   Wt 49 kg (108 lb)   SpO2 100%   BMI 20.41 kg/m²         Intake/Output Summary (Last 24 hours) at 10/26/2021 1743  Last data filed at 10/26/2021 1300  Gross per 24 hour   Intake 6 ml   Output 3000 ml   Net -2994 ml       Current Facility-Administered Medications   Medication Dose Route Frequency Provider Last Rate Last Admin    epoetin romelia-epbx (RETACRIT) injection 10,000 Units  10,000 Units SubCUTAneous Q TUE, THU & SAT Alondra Giron MD        doxercalciferoL (HECTOROL) 4 mcg/2 mL injection 2 mcg  2 mcg IntraVENous Q TU, TH & SAT Alondra Giron MD        heparin (porcine) 1,000 unit/mL injection             influenza vaccine 2021-22 (6 mos+)(PF) (FLUARIX/FLULAVAL/FLUZONE QUAD) injection 0.5 mL  1 Each IntraMUSCular PRIOR TO DISCHARGE May Castillo DO        darunavir ethanolate (PREZISTA) tablet 800 mg  800 mg Oral DAILY WITH BREAKFAST May Castillo DO   800 mg at 10/26/21 4066    dolutegravir (TIVICAY) tablet 50 mg  50 mg Oral DAILY May Castillo DO   50 mg at 10/26/21 0900    pantoprazole (PROTONIX) tablet 40 mg  40 mg Oral ACB May Casitllo DO   40 mg at 10/26/21 1412    gabapentin (NEURONTIN) capsule 200 mg  200 mg Oral QHS May Castillo DO   200 mg at 10/25/21 2133    lamiVUDine (EPIVIR) tablet 150 mg 150 mg Oral DAILY aMy Nathan, DO   150 mg at 10/26/21 2844    melatonin tablet 6 mg  6 mg Oral QHS May Castillo, DO   6 mg at 10/25/21 2133    mirtazapine (REMERON) tablet 7.5 mg  7.5 mg Oral QHS May Castillo, DO   7.5 mg at 10/25/21 2133    pyridoxine (vitamin B6) (VITAMIN B-6) tablet 50 mg  50 mg Oral DAILY May Castillo, DO   50 mg at 10/26/21 7704    QUEtiapine (SEROquel) tablet 25 mg  25 mg Oral QHS May Castillo, DO   25 mg at 10/25/21 2133    ritonavir (NORVIR) tablet 100 mg  100 mg Oral DAILY WITH BREAKFAST May Castillo, DO   100 mg at 10/26/21 6547    senna (SENOKOT) tablet 8.6 mg  1 Tablet Oral DAILY May Castillo, DO   8.6 mg at 10/26/21 2407    sodium chloride (NS) flush 5-40 mL  5-40 mL IntraVENous Q8H May Castillo, DO   10 mL at 10/25/21 2134    sodium chloride (NS) flush 5-40 mL  5-40 mL IntraVENous PRN May Castillo DO        acetaminophen (TYLENOL) tablet 650 mg  650 mg Oral Q6H PRN Tracee MORRIS DO   650 mg at 10/25/21 7411    Or    acetaminophen (TYLENOL) suppository 650 mg  650 mg Rectal Q6H PRN May Castillo, DO        polyethylene glycol (MIRALAX) packet 17 g  17 g Oral DAILY PRN May Nathan DO        heparin (porcine) injection 5,000 Units  5,000 Units SubCUTAneous Q8H May Castillo, DO   5,000 Units at 10/25/21 4780    diphenhydrAMINE (BENADRYL) capsule 50 mg  50 mg Oral Q6H PRN Christina Miller MD   50 mg at 10/26/21 0420    sodium chloride (NS) flush 5-10 mL  5-10 mL IntraVENous PRN May Castillo DO        [Held by provider] nitroglycerin (TRIDIL) 400 mcg/ml infusion  100 mcg/min IntraVENous TITRATE May Castillo DO   Stopped at 10/25/21 1355    heparin (porcine) 1,000 unit/mL injection 4,000 Units  4,000 Units Hemodialysis DIALYSIS PRN Sheila Cabrera DO   4,000 Units at 10/26/21 1300        Physical Exam:     Physical Exam:   General:  Alert, cooperative, no distress, appears stated age.    Neck: Supple, symmetrical, trachea midline, no adenopathy, thyroid: no enlargement/tenderness/nodules, no carotid bruit and no JVD. Lungs:   Decreased breath sound   Heart:  Regular rate and rhythm, S1, S2 normal, no murmur, click, rub or gallop. Abdomen:   Soft, non-tender. Bowel sounds normal. No masses,  No organomegaly. Extremities: Extremities normal, atraumatic, no cyanosis or edema, HD  Catheter tolerating Blood fow of 350cc   Skin: Skin color, texture, turgor normal. No rashes or lesions         Data Review:    CBC w/Diff    Recent Labs     10/26/21  0425 10/25/21  1830 10/25/21  1035 10/25/21  1035 10/24/21  2000 10/24/21  2000   WBC 4.5*  --   --  4.0*  --  10.4   RBC 2.52*  --   --  2.57*  --  3.27*   HGB 7.3* 8.2*  --  7.4*   < > 9.5*   HCT 23.9* 26.3*  --  23.7*   < > 32.2*   MCV 94.8  --    < > 92.2   < > 98.5   MCH 29.0  --    < > 28.8   < > 29.1   MCHC 30.5*  --    < > 31.2   < > 29.5*   RDW 16.7*  --    < > 16.3*   < > 16.4*    < > = values in this interval not displayed. Recent Labs     10/26/21  0425 10/25/21  1035 10/25/21  1035 10/24/21  2000 10/24/21  2000   MONOS 11*  --  12*  --  8   EOS 4  --  1  --  7*   BASOS 1   < > 1   < > 2   RDW 16.7*   < > 16.3*   < > 16.4*    < > = values in this interval not displayed.         Comprehensive Metabolic Profile    Recent Labs     10/26/21  0425 10/25/21  0909 10/24/21  2000    136 140   K 4.0 3.7 3.8    103 108   CO2 24 27 20*   BUN 37* 14 26*   CREA 4.92* 3.04* 5.20*    Recent Labs     10/26/21  0425 10/25/21  0909 10/24/21  2000   CA 7.7*  7.6* 7.5* 7.0*   PHOS 4.3 3.9  --    ALB  --  2.0* 2.3*   TP  --  6.6 7.7   TBILI  --  0.3 <0.1*                  }        Impression:       Active Hospital Problems    Diagnosis Date Noted    Hypoxia 10/25/2021    ESRD (end stage renal disease) on dialysis ([facility] 10/25/2021    Volume overload 10/25/2021    Hypertension 10/25/2021    Respiratory failure ([facility] 10/25/2021            Plan:     UF Goal 3 kg, Will give Hectorol 2 microgram & Retacrit post dialysis. Needs to initiate DC plan.       Hermelindo Brice MD

## 2021-10-27 LAB
ANION GAP SERPL CALC-SCNC: 6 MMOL/L (ref 3–18)
BACTERIA SPEC CULT: ABNORMAL
BASOPHILS # BLD: 0 K/UL (ref 0–0.1)
BASOPHILS NFR BLD: 1 % (ref 0–2)
BUN SERPL-MCNC: 26 MG/DL (ref 7–18)
BUN/CREAT SERPL: 7 (ref 12–20)
CALCIUM SERPL-MCNC: 8.3 MG/DL (ref 8.5–10.1)
CHLORIDE SERPL-SCNC: 104 MMOL/L (ref 100–111)
CO2 SERPL-SCNC: 26 MMOL/L (ref 21–32)
CREAT SERPL-MCNC: 4 MG/DL (ref 0.6–1.3)
DIFFERENTIAL METHOD BLD: ABNORMAL
EOSINOPHIL # BLD: 0.5 K/UL (ref 0–0.4)
EOSINOPHIL NFR BLD: 13 % (ref 0–5)
ERYTHROCYTE [DISTWIDTH] IN BLOOD BY AUTOMATED COUNT: 16.7 % (ref 11.6–14.5)
GLUCOSE SERPL-MCNC: 80 MG/DL (ref 74–99)
GRAM STN SPEC: ABNORMAL
GRAM STN SPEC: ABNORMAL
HCT VFR BLD AUTO: 29.1 % (ref 35–45)
HGB BLD-MCNC: 8.7 G/DL (ref 12–16)
LYMPHOCYTES # BLD: 1 K/UL (ref 0.9–3.6)
LYMPHOCYTES NFR BLD: 25 % (ref 21–52)
MCH RBC QN AUTO: 28.4 PG (ref 24–34)
MCHC RBC AUTO-ENTMCNC: 29.9 G/DL (ref 31–37)
MCV RBC AUTO: 95.1 FL (ref 78–100)
MONOCYTES # BLD: 0.4 K/UL (ref 0.05–1.2)
MONOCYTES NFR BLD: 11 % (ref 3–10)
NEUTS SEG # BLD: 2.1 K/UL (ref 1.8–8)
NEUTS SEG NFR BLD: 51 % (ref 40–73)
PLATELET # BLD AUTO: 267 K/UL (ref 135–420)
PMV BLD AUTO: 9.9 FL (ref 9.2–11.8)
POTASSIUM SERPL-SCNC: 3.1 MMOL/L (ref 3.5–5.5)
RBC # BLD AUTO: 3.06 M/UL (ref 4.2–5.3)
SERVICE CMNT-IMP: ABNORMAL
SODIUM SERPL-SCNC: 136 MMOL/L (ref 136–145)
WBC # BLD AUTO: 4.1 K/UL (ref 4.6–13.2)

## 2021-10-27 PROCEDURE — 36415 COLL VENOUS BLD VENIPUNCTURE: CPT

## 2021-10-27 PROCEDURE — 2709999900 HC NON-CHARGEABLE SUPPLY

## 2021-10-27 PROCEDURE — 97165 OT EVAL LOW COMPLEX 30 MIN: CPT

## 2021-10-27 PROCEDURE — 74011250637 HC RX REV CODE- 250/637: Performed by: STUDENT IN AN ORGANIZED HEALTH CARE EDUCATION/TRAINING PROGRAM

## 2021-10-27 PROCEDURE — 96372 THER/PROPH/DIAG INJ SC/IM: CPT

## 2021-10-27 PROCEDURE — 80048 BASIC METABOLIC PNL TOTAL CA: CPT

## 2021-10-27 PROCEDURE — 99232 SBSQ HOSP IP/OBS MODERATE 35: CPT | Performed by: FAMILY MEDICINE

## 2021-10-27 PROCEDURE — 74011250637 HC RX REV CODE- 250/637: Performed by: FAMILY MEDICINE

## 2021-10-27 PROCEDURE — 65660000000 HC RM CCU STEPDOWN

## 2021-10-27 PROCEDURE — 97161 PT EVAL LOW COMPLEX 20 MIN: CPT

## 2021-10-27 PROCEDURE — 85025 COMPLETE CBC W/AUTO DIFF WBC: CPT

## 2021-10-27 PROCEDURE — 74011250636 HC RX REV CODE- 250/636: Performed by: STUDENT IN AN ORGANIZED HEALTH CARE EDUCATION/TRAINING PROGRAM

## 2021-10-27 PROCEDURE — 99218 HC RM OBSERVATION: CPT

## 2021-10-27 RX ORDER — HYDRALAZINE HYDROCHLORIDE 20 MG/ML
10 INJECTION INTRAMUSCULAR; INTRAVENOUS
Status: DISCONTINUED | OUTPATIENT
Start: 2021-10-27 | End: 2021-10-29 | Stop reason: HOSPADM

## 2021-10-27 RX ADMIN — Medication 6 MG: at 22:30

## 2021-10-27 RX ADMIN — PANTOPRAZOLE SODIUM 40 MG: 20 TABLET, DELAYED RELEASE ORAL at 09:03

## 2021-10-27 RX ADMIN — DIPHENHYDRAMINE HYDROCHLORIDE 50 MG: 25 CAPSULE ORAL at 09:03

## 2021-10-27 RX ADMIN — Medication 5 ML: at 22:00

## 2021-10-27 RX ADMIN — POTASSIUM BICARBONATE 40 MEQ: 391 TABLET, EFFERVESCENT ORAL at 15:13

## 2021-10-27 RX ADMIN — DARUNAVIR 800 MG: 800 TABLET, FILM COATED ORAL at 09:03

## 2021-10-27 RX ADMIN — QUETIAPINE FUMARATE 25 MG: 25 TABLET ORAL at 22:30

## 2021-10-27 RX ADMIN — POTASSIUM BICARBONATE 40 MEQ: 391 TABLET, EFFERVESCENT ORAL at 17:42

## 2021-10-27 RX ADMIN — SENNOSIDES 8.6 MG: 8.6 TABLET, COATED ORAL at 09:03

## 2021-10-27 RX ADMIN — RITONAVIR 100 MG: 100 TABLET, FILM COATED ORAL at 09:03

## 2021-10-27 RX ADMIN — HEPARIN SODIUM 5000 UNITS: 5000 INJECTION INTRAVENOUS; SUBCUTANEOUS at 22:30

## 2021-10-27 RX ADMIN — DOLUTEGRAVIR SODIUM 50 MG: 50 TABLET, FILM COATED ORAL at 09:00

## 2021-10-27 RX ADMIN — LAMIVUDINE 150 MG: 150 TABLET, FILM COATED ORAL at 09:03

## 2021-10-27 RX ADMIN — GABAPENTIN 200 MG: 100 CAPSULE ORAL at 22:30

## 2021-10-27 RX ADMIN — MIRTAZAPINE 7.5 MG: 15 TABLET, FILM COATED ORAL at 22:30

## 2021-10-27 RX ADMIN — Medication 10 ML: at 15:19

## 2021-10-27 RX ADMIN — PYRIDOXINE HCL TAB 50 MG 50 MG: 50 TAB at 09:04

## 2021-10-27 NOTE — PROGRESS NOTES
Problem: Mobility Impaired (Adult and Pediatric)  Goal: *Acute Goals and Plan of Care (Insert Text)  Outcome: Resolved/Met     PHYSICAL THERAPY EVALUATION AND DISCHARGE    Patient: Hailey Leslie (42 y.o. female)  Date: 10/27/2021  Primary Diagnosis: Hypoxia [R09.02]        Precautions:      WBAT  PLOF: pt reports being homeless and living in [facility] and that she is always walking \"wobbly\" without an AD however chart review indicates pt was picked up from a home locally where she lives with her daughter     ASSESSMENT :  Based on the objective data described below, the patient presents with baseline functional mobility level. Pt is only oriented to self at this time, reporting she is homeless and that she is in [place], chart review indicates she lives with her daughter. Pt is mildly unsteady with gait as she says this is how she always walks but is agreeable to a walker for increased support. Pt with BLE ROM and sensation being [facility] and strength being generally decreased. Pt does not require further skilled PT in the acute setting, recommend a supportive environment with a RW upon discharge. Patient does not require further skilled intervention at this level of care. PLAN :  Recommendations and Planned Interventions:   No formal PT needs identified at this time. Discharge Recommendations: None  Further Equipment Recommendations for Discharge: rolling walker     SUBJECTIVE:   Patient stated they call me wobbles.     OBJECTIVE DATA SUMMARY:     Past Medical History:   Diagnosis Date    Acute kidney injury ([facility]     AIDS (acquired immune deficiency syndrome) ([facility]     Anemia     Esophageal candidiasis (HCC)     ESRD (end stage renal disease) (HCC)     HCV (hepatitis C virus)     Hepatitis B     Hepatitis C     HIV (human immunodeficiency virus infection) ([facility]     HIV (human immunodeficiency virus infection) ([facility]     Late latent syphilis     Pulmonary TB     Schizoaffective disorder ([facility]     Substance abuse [facility])      Past Surgical History:   Procedure Laterality Date    HX GYN      HX NEPHRECTOMY      left kidney removed at age 15    HX NEPHRECTOMY       Barriers to Learning/Limitations: yes;  physical  Compensate with: Visual Cues, Verbal Cues, and Tactile Cues  Home Situation:   Home Situation  Home Environment: Other (comment) (pt reports being homeless )  One/Two Story Residence: One story  Living Alone: No  Support Systems: Other Family Member(s)  Patient Expects to be Discharged to[de-identified] House  Current DME Used/Available at Home: None  Critical Behavior:  Neurologic State: Alert;Confused  Orientation Level: Oriented to person;Disoriented to place; Disoriented to situation;Disoriented to time  Cognition: Follows commands     Psychosocial  Patient Behaviors: Calm; Cooperative                 Strength:    Strength: Generally decreased, functional       Tone & Sensation:   Tone: Normal              Sensation: Intact        Range Of Motion:  AROM: Within functional limits       Functional Mobility:  Bed Mobility:     Supine to Sit: Modified independent  Sit to Supine: Modified independent  Scooting: Modified independent  Transfers:  Sit to Stand: Supervision  Stand to Sit: Supervision       Balance:   Sitting: Intact  Standing: Impaired; Without support  Standing - Static: Good  Standing - Dynamic : Fair       Ambulation/Gait Training:  Distance (ft): 20 Feet (ft)  Assistive Device: Other (comment) (none )  Ambulation - Level of Assistance: Supervision        Gait Abnormalities: Decreased step clearance    Speed/Karen: Pace decreased (<100 feet/min)  Step Length: Right shortened;Left shortened    Pain:  Pain level pre-treatment: 0/10   Pain level post-treatment: 0/10  Pain Intervention(s): Medication (see MAR); Rest, Ice, Repositioning   Response to intervention: Nurse notified    Activity Tolerance:   Good    Please refer to the flowsheet for vital signs taken during this treatment.   After treatment:   [] Patient left in no apparent distress sitting up in chair  [x]         Patient left in no apparent distress in bed  [x]         Call bell left within reach  [x]         Nursing notified  []         Caregiver present  []         Bed alarm activated  []         SCDs applied    COMMUNICATION/EDUCATION:   [x]         Role of Physical Therapy in the acute care setting. [x]         Fall prevention education was provided and the patient/caregiver indicated understanding. [x]         Patient/family have participated as able in goal setting and plan of care. [x]         Patient/family agree to work toward stated goals and plan of care. []         Patient understands intent and goals of therapy, but is neutral about his/her participation. []         Patient is unable to participate in goal setting/plan of care: ongoing with therapy staff.  []         Other:     Thank you for this referral.  Aissatou Nieves   Time Calculation: 8 mins      Eval Complexity: History: MEDIUM  Complexity : 1-2 comorbidities / personal factors will impact the outcome/ POC Exam:MEDIUM Complexity : 3 Standardized tests and measures addressing body structure, function, activity limitation and / or participation in recreation  Presentation: LOW Complexity : Stable, uncomplicated  Clinical Decision Making:Low Complexity    Overall Complexity:LOW

## 2021-10-27 NOTE — PROGRESS NOTES
Progress Note    Erich Box  64 y.o. Admit Date: 10/24/2021  Active Problems:    Hypoxia (10/25/2021) POA: Unknown      ESRD (end stage renal disease) on dialysis ([facility] (10/25/2021) POA: Unknown      Volume overload (10/25/2021) POA: Unknown      Hypertension (10/25/2021) POA: Unknown      Respiratory failure ([facility] (10/25/2021) POA: Unknown            Subjective:     Patient is comfortable, laying flat in bed, no SOB. Does not know where she will be going Post Discharge, came [address] to spent time with her Daughter. Until she is hospitalized she used to get Dialysis at [facility]. Was dialyzed yesterday. .       A comprehensive review of systems was negative except for that written in the History of Present Illness.     Objective:     Visit Vitals  BP (!) 143/82 (BP 1 Location: Right upper arm, BP Patient Position: Lying left side)   Pulse 84   Temp 98.1 °F (36.7 °C)   Resp 18   Ht 5' 1\" (1.549 m)   Wt 49 kg (108 lb)   SpO2 98%   BMI 20.41 kg/m²       No intake or output data in the 24 hours ending 10/27/21 1545    Current Facility-Administered Medications   Medication Dose Route Frequency Provider Last Rate Last Admin    potassium bicarb-citric acid (EFFER-K) tablet 40 mEq  40 mEq Oral Q4H Christos Schultz MD   40 mEq at 10/27/21 1513    hydrALAZINE (APRESOLINE) 20 mg/mL injection 10 mg  10 mg IntraVENous Q6H PRN Christos Schultz MD        epoetin romelia-epbx (RETACRIT) injection 10,000 Units  10,000 Units SubCUTAneous Q TUE, THU & SAT Angel Diaz MD   10,000 Units at 10/26/21 2253    doxercalciferoL (HECTOROL) 4 mcg/2 mL injection 2 mcg  2 mcg IntraVENous Q TU, TH & SAT Angel Diaz MD   2 mcg at 10/26/21 1845    influenza vaccine 2021-22 (6 mos+)(PF) (FLUARIX/FLULAVAL/FLUZONE QUAD) injection 0.5 mL  1 Each IntraMUSCular PRIOR TO DISCHARGE May Castillo DO        darunavir ethanolate (PREZISTA) tablet 800 mg  800 mg Oral DAILY WITH BREAKFAST May Castillo DO   800 mg at 10/27/21 0903    dolutegravir (TIVICAY) tablet 50 mg  50 mg Oral DAILY May Castillo, DO   50 mg at 10/27/21 0900    pantoprazole (PROTONIX) tablet 40 mg  40 mg Oral ACB May Castillo, DO   40 mg at 10/27/21 8389    gabapentin (NEURONTIN) capsule 200 mg  200 mg Oral QHS May Castillo, DO   200 mg at 10/26/21 2252    lamiVUDine (EPIVIR) tablet 150 mg  150 mg Oral DAILY May Castillo, DO   150 mg at 10/27/21 2233    melatonin tablet 6 mg  6 mg Oral QHS May Castillo, DO   6 mg at 10/26/21 2253    mirtazapine (REMERON) tablet 7.5 mg  7.5 mg Oral QHS May Castillo, DO   7.5 mg at 10/26/21 2252    pyridoxine (vitamin B6) (VITAMIN B-6) tablet 50 mg  50 mg Oral DAILY May Castillo, DO   50 mg at 10/27/21 9661    QUEtiapine (SEROquel) tablet 25 mg  25 mg Oral QHS May Castillo, DO   25 mg at 10/26/21 2253    ritonavir (NORVIR) tablet 100 mg  100 mg Oral DAILY WITH BREAKFAST May Castillo, DO   100 mg at 10/27/21 0443    senna (SENOKOT) tablet 8.6 mg  1 Tablet Oral DAILY May Castillo, DO   8.6 mg at 10/27/21 9660    sodium chloride (NS) flush 5-40 mL  5-40 mL IntraVENous Q8H May Castillo, DO   10 mL at 10/27/21 1519    sodium chloride (NS) flush 5-40 mL  5-40 mL IntraVENous PRN May Castillo, DO        acetaminophen (TYLENOL) tablet 650 mg  650 mg Oral Q6H PRN May Castillo, DO   650 mg at 10/25/21 6959    Or    acetaminophen (TYLENOL) suppository 650 mg  650 mg Rectal Q6H PRN May Castillo, DO        polyethylene glycol (MIRALAX) packet 17 g  17 g Oral DAILY PRN May Arana, DO        heparin (porcine) injection 5,000 Units  5,000 Units SubCUTAneous Q8H May Castillo DO   5,000 Units at 10/26/21 2253    diphenhydrAMINE (BENADRYL) capsule 50 mg  50 mg Oral Q6H PRN Yakelin Moreno MD   50 mg at 10/27/21 6263    sodium chloride (NS) flush 5-10 mL  5-10 mL IntraVENous PRN May Castillo DO        [Held by provider] nitroglycerin (TRIDIL) 400 mcg/ml infusion  100 mcg/min IntraVENous TITRATE May Castillo DO   Stopped at 10/25/21 1355    heparin (porcine) 1,000 unit/mL injection 4,000 Units  4,000 Units Hemodialysis DIALYSIS PRN Estefani Nelson DO   4,000 Units at 10/26/21 1300        Physical Exam:     Physical Exam:   General:  Alert, cooperative, no distress, appears stated age. Neck: Supple, symmetrical, trachea midline, no adenopathy, thyroid: no enlargement/tenderness/nodules, no carotid bruit and no JVD. Lungs:   Clear to auscultation bilaterally. Heart:  Regular rate and rhythm, S1, S2 normal, no murmur, click, rub or gallop. Abdomen:   Soft, non-tender. Bowel sounds normal. No masses,  No organomegaly. Extremities: Extremities normal, atraumatic, no cyanosis or edema, TDC in Right IJ          Data Review:    CBC w/Diff    Recent Labs     10/27/21  0314 10/26/21  0425 10/26/21  0425 10/25/21  1830 10/25/21  1035 10/25/21  1035   WBC 4.1*  --  4.5*  --   --  4.0*   RBC 3.06*  --  2.52*  --   --  2.57*   HGB 8.7*  --  7.3* 8.2*   < > 7.4*   HCT 29.1*  --  23.9* 26.3*   < > 23.7*   MCV 95.1   < > 94.8  --    < > 92.2   MCH 28.4   < > 29.0  --    < > 28.8   MCHC 29.9*   < > 30.5*  --    < > 31.2   RDW 16.7*   < > 16.7*  --    < > 16.3*    < > = values in this interval not displayed. Recent Labs     10/27/21  0314 10/26/21  0425 10/26/21  0425 10/25/21  1035 10/25/21  1035   MONOS 11*  --  11*  --  12*   EOS 13*  --  4  --  1   BASOS 1   < > 1   < > 1   RDW 16.7*   < > 16.7*   < > 16.3*    < > = values in this interval not displayed.         Comprehensive Metabolic Profile    Recent Labs     10/27/21  0314 10/26/21  0425 10/25/21  0909    138 136   K 3.1* 4.0 3.7    105 103   CO2 26 24 27   BUN 26* 37* 14   CREA 4.00* 4.92* 3.04*    Recent Labs     10/27/21  0314 10/26/21  0425 10/25/21  0909 10/24/21  2000 10/24/21  2000   CA 8.3* 7.7*  7.6* 7.5*   < > 7.0*   PHOS  --  4.3 3.9  --   --    ALB  --   --  2.0*  --  2.3*   TP  -- --  6.6  --  7.7   TBILI  --   --  0.3  --  <0.1*    < > = values in this interval not displayed. Impression:       Active Hospital Problems    Diagnosis Date Noted    Hypoxia 10/25/2021    ESRD (end stage renal disease) on dialysis ([facility] 10/25/2021    Volume overload 10/25/2021    Hypertension 10/25/2021    Respiratory failure ([facility] 10/25/2021            Plan:     Continue current spuportive care, needs the Discharge plan. [facility]       Mina Mcwilliams MD

## 2021-10-27 NOTE — ROUTINE PROCESS
Bedside and Verbal shift change report given to [place] (oncoming nurse) by Roseanne Pearson RN (offgoing nurse). Report included the following information SBAR, Kardex, Intake/Output, MAR and Recent Results. Patient alert and resting in bed. Bedside commode in reach.

## 2021-10-27 NOTE — PROGRESS NOTES
Problem: Self Care Deficits Care Plan (Adult)  Goal: *Acute Goals and Plan of Care (Insert Text)  Outcome: Resolved/Met   OCCUPATIONAL THERAPY EVALUATION/DISCHARGE    Patient: Katherine Ferrer (44 y.o. female)  Date: 10/27/2021  Primary Diagnosis: Hypoxia [R09.02]        Precautions:   Fall  PLOF: Pt reports she is homeless and states she hitch hiked from [place] to [address] [facility] and slept in the woods. Pt reports previously performing all ADLs and states she has no equipment. ASSESSMENT AND RECOMMENDATIONS:  Pt sitting on edge of bed and agreeable to OT session upon OT entry. Pt oriented to self only and reporting she is homeless and can not return to her daughter's home because her daughter does not have room for her. Pt does follow commands and demonstrates good attention/concentration. BUE MMT performed with pt in seated position with pt demonstrating WFL UB strength, however, reports chronic sensation loss in B hands. Pt able to demo ripping paper effectively, however, unable to unscrew toothpaste small cap. Pt demonstrated doffing/donning socks with MI. Pt performed bathroom mobility with SUP and no AD. Once standing at sink pt performed oral care and simple grooming with MI. Pt with forward flexed posture, and when cued for posture reported \"this is how I brush my teeth. \" Pt performed toileting with SUP for transfer sitting on commode sideways and hygiene with MI. Pt returned to edge of bed and left with all needs in reach. Educated pt on safety in room. Educated pt on sitting during bathing when returns home/next facility to maintain safety in bathroom. Pt verbalized understanding. Skilled occupational therapy is not indicated at this time. Discharge Recommendations: home with family assist vs. Shelter with supportive services  Further Equipment Recommendations for Discharge: shower chair      SUBJECTIVE:   Patient stated Garret Schwab they found somewhere for me to go?     OBJECTIVE DATA SUMMARY:     Past Medical History:   Diagnosis Date    Acute kidney injury ([facility]     AIDS (acquired immune deficiency syndrome) (HCC)     Anemia     Esophageal candidiasis (HCC)     ESRD (end stage renal disease) (HCC)     HCV (hepatitis C virus)     Hepatitis B     Hepatitis C     [facility]     HIV (human immunodeficiency virus infection) ([facility]     Late latent syphilis     Pulmonary TB     Schizoaffective disorder ([facility]     Substance abuse ([facility]      Past Surgical History:   Procedure Laterality Date    HX GYN      HX NEPHRECTOMY      left kidney removed at age 15    HX NEPHRECTOMY       Barriers to Learning/Limitations: None  Compensate with: visual, verbal, tactile, kinesthetic cues/model    Home Situation:   Home Situation  Home Environment: Other (comment) (pt reprts homelessness)  One/Two Story Residence: One story  Living Alone: No  Support Systems: Other Family Member(s)  Patient Expects to be Discharged to[de-identified] House  Current DME Used/Available at Home: None  [x]     Right hand dominant   []     Left hand dominant    Cognitive/Behavioral Status:  Neurologic State: Alert  Orientation Level: Oriented to person;Disoriented to place; Disoriented to situation;Disoriented to time  Cognition: Follows commands  Safety/Judgement: Fall prevention;Home safety    Skin: intact  Edema: none noted     Vision/Perceptual:       WFL     Coordination: BUE  Coordination: Generally decreased, functional  Fine Motor Skills-Upper: Left Impaired;Right Impaired (difficulty opening small packages such as toothpast cap)    Gross Motor Skills-Upper: Left Intact; Right Intact  Balance:  Sitting: Intact  Standing: Impaired  Standing - Static: Good (to Fair)  Standing - Dynamic : Fair  Strength: BUE  Strength: Generally decreased, functional   Tone & Sensation: BUE  Tone: Normal  Sensation: Impaired (B palms)   Range of Motion: BUE  AROM: Within functional limits     Functional Mobility and Transfers for ADLs:  Bed Mobility:  Supine to Sit: Modified independent  Sit to Supine: Modified independent  Scooting: Modified independent  Transfers:  Sit to Stand: Supervision  Stand to Sit: Supervision    Toilet Transfer : Supervision     Bathroom Mobility: Supervision/set up  ADL Assessment:  Feeding: Modified independent  Oral Facial Hygiene/Grooming: Modified Independent    Upper Body Dressing: Modified independent  Lower Body Dressing: Modified independent  Toileting: Modified independent     ADL Intervention:   Cognitive Retraining  Safety/Judgement: Fall prevention;Home safety  Pain:  Pain level pre-treatment: 0/10   Pain level post-treatment: 0/10   Pain Intervention(s): Medication (see MAR); Rest, Repositioning   Response to intervention: Nurse notified, See doc flow    Activity Tolerance:   good  Please refer to the flowsheet for vital signs taken during this treatment. After treatment:   []  Patient left in no apparent distress sitting up in chair  [x]  Patient left in no apparent distress in bed  [x]  Call bell left within reach  []  Nursing notified  []  Caregiver present  []  Bed alarm activated    COMMUNICATION/EDUCATION:   [x]      Role of Occupational Therapy in the acute care setting  [x]      Home safety education was provided and the patient/caregiver indicated understanding. [x]      Patient/family have participated as able and agree with findings and recommendations. []      Patient is unable to participate in plan of care at this time. Thank you for this referral.  Miriam Lau OT  Time Calculation: 21 mins      Eval Complexity: History: LOW Complexity : Brief history review ; Examination: LOW Complexity : 1-3 performance deficits relating to physical, cognitive , or psychosocial skils that result in activity limitations and / or participation restrictions ;    Decision Making:LOW Complexity : No comorbidities that affect functional and no verbal or physical assistance needed to complete eval tasks

## 2021-10-27 NOTE — PROGRESS NOTES
[facility] Hospitalists  Progress Note    Patient: Madelin Valverde Age: 64 y.o. : 1960 MR#: [id number] SSN: [id number]  Date: 10/27/2021     Subjective/24-hour events:     Complains of feeling dizzy at times after standing. No HA, CP or SOB reported. Afebrile overnight. Assessment:   Acute respiratory failure with hypoxia and hypercapnea  ESRD  Volume overload/flash pulmonary edema  Lactic acidosis  Anemia of chronic disease  HIV  Schizophrenia  Hx Hep B and C  Hx poor compliance    Plan:   Antibiotic therapy discontinued - no evidence for acute/active infection currently. Monitor off. Antiretroviral therapy as ordered. HD per nephrology. Will need outpatient unit arranged as patient states that she will remain in area and not return to [facility]. Follow up with CM to discuss discharge needs. Case discussed with:  [x]Patient  []Family  [x]Nursing  []Case Management  DVT Prophylaxis:  []Lovenox  []Hep SQ  []SCDs  []Coumadin   []On Heparin gtt    Objective:   VS:   Visit Vitals  BP (!) 166/94   Pulse 80   Temp 97.6 °F (36.4 °C)   Resp 18   Ht 5' 1\" (1.549 m)   Wt 49 kg (108 lb)   SpO2 97%   BMI 20.41 kg/m²      Tmax/24hrs: Temp (24hrs), Av.2 °F (36.8 °C), Min:97.6 °F (36.4 °C), Max:98.5 °F (36.9 °C)      Intake/Output Summary (Last 24 hours) at 10/27/2021 1046  Last data filed at 10/26/2021 1300  Gross per 24 hour   Intake    Output 3000 ml   Net -3000 ml       General: Chronically ill-appearing but nontoxic. In NAD. Cardiovascular:  RRR. Pulmonary:  Lungs clear bilaterally, no wheezes. GI:  Abdomen soft, NTTP. Extremities:  Warm, no edema or ischemia. Neuro:  Awake and alert. Moves extremities spontaneously.     Labs:    Recent Results (from the past 24 hour(s))   METABOLIC PANEL, BASIC    Collection Time: 10/27/21  3:14 AM   Result Value Ref Range    Sodium 136 136 - 145 mmol/L    Potassium 3.1 (L) 3.5 - 5.5 mmol/L    Chloride 104 100 - 111 mmol/L    CO2 26 21 - 32 mmol/L    Anion gap 6 3.0 - 18 mmol/L    Glucose 80 74 - 99 mg/dL    BUN 26 (H) 7.0 - 18 MG/DL    Creatinine 4.00 (H) 0.6 - 1.3 MG/DL    BUN/Creatinine ratio 7 (L) 12 - 20      GFR est AA 14 (L) >60 ml/min/1.73m2    GFR est non-AA 12 (L) >60 ml/min/1.73m2    Calcium 8.3 (L) 8.5 - 10.1 MG/DL   CBC WITH AUTOMATED DIFF    Collection Time: 10/27/21  3:14 AM   Result Value Ref Range    WBC 4.1 (L) 4.6 - 13.2 K/uL    RBC 3.06 (L) 4.20 - 5.30 M/uL    HGB 8.7 (L) 12.0 - 16.0 g/dL    HCT 29.1 (L) 35.0 - 45.0 %    MCV 95.1 78.0 - 100.0 FL    MCH 28.4 24.0 - 34.0 PG    MCHC 29.9 (L) 31.0 - 37.0 g/dL    RDW 16.7 (H) 11.6 - 14.5 %    PLATELET 736 626 - 903 K/uL    MPV 9.9 9.2 - 11.8 FL    NEUTROPHILS 51 40 - 73 %    LYMPHOCYTES 25 21 - 52 %    MONOCYTES 11 (H) 3 - 10 %    EOSINOPHILS 13 (H) 0 - 5 %    BASOPHILS 1 0 - 2 %    ABS. NEUTROPHILS 2.1 1.8 - 8.0 K/UL    ABS. LYMPHOCYTES 1.0 0.9 - 3.6 K/UL    ABS. MONOCYTES 0.4 0.05 - 1.2 K/UL    ABS. EOSINOPHILS 0.5 (H) 0.0 - 0.4 K/UL    ABS.  BASOPHILS 0.0 0.0 - 0.1 K/UL    DF AUTOMATED         Signed By: Deepa Palencia MD     October 27, 2021

## 2021-10-28 LAB
ANION GAP SERPL CALC-SCNC: 5 MMOL/L (ref 3–18)
BASOPHILS # BLD: 0 K/UL (ref 0–0.1)
BASOPHILS NFR BLD: 1 % (ref 0–2)
BUN SERPL-MCNC: 46 MG/DL (ref 7–18)
BUN/CREAT SERPL: 7 (ref 12–20)
CALCIUM SERPL-MCNC: 8.4 MG/DL (ref 8.5–10.1)
CHLORIDE SERPL-SCNC: 103 MMOL/L (ref 100–111)
CO2 SERPL-SCNC: 29 MMOL/L (ref 21–32)
CREAT SERPL-MCNC: 6.62 MG/DL (ref 0.6–1.3)
DIFFERENTIAL METHOD BLD: ABNORMAL
EOSINOPHIL # BLD: 0.4 K/UL (ref 0–0.4)
EOSINOPHIL NFR BLD: 9 % (ref 0–5)
ERYTHROCYTE [DISTWIDTH] IN BLOOD BY AUTOMATED COUNT: 17 % (ref 11.6–14.5)
GLUCOSE SERPL-MCNC: 91 MG/DL (ref 74–99)
HCT VFR BLD AUTO: 28.8 % (ref 35–45)
HGB BLD-MCNC: 8.8 G/DL (ref 12–16)
LYMPHOCYTES # BLD: 1 K/UL (ref 0.9–3.6)
LYMPHOCYTES NFR BLD: 26 % (ref 21–52)
MCH RBC QN AUTO: 29.3 PG (ref 24–34)
MCHC RBC AUTO-ENTMCNC: 30.6 G/DL (ref 31–37)
MCV RBC AUTO: 96 FL (ref 78–100)
MONOCYTES # BLD: 0.4 K/UL (ref 0.05–1.2)
MONOCYTES NFR BLD: 11 % (ref 3–10)
NEUTS SEG # BLD: 2.1 K/UL (ref 1.8–8)
NEUTS SEG NFR BLD: 52 % (ref 40–73)
PHOSPHATE SERPL-MCNC: 2.3 MG/DL (ref 2.5–4.9)
PLATELET # BLD AUTO: 271 K/UL (ref 135–420)
PMV BLD AUTO: 10.2 FL (ref 9.2–11.8)
POTASSIUM SERPL-SCNC: 4.4 MMOL/L (ref 3.5–5.5)
RBC # BLD AUTO: 3 M/UL (ref 4.2–5.3)
SODIUM SERPL-SCNC: 137 MMOL/L (ref 136–145)
WBC # BLD AUTO: 3.9 K/UL (ref 4.6–13.2)

## 2021-10-28 PROCEDURE — 74011250637 HC RX REV CODE- 250/637: Performed by: FAMILY MEDICINE

## 2021-10-28 PROCEDURE — 84100 ASSAY OF PHOSPHORUS: CPT

## 2021-10-28 PROCEDURE — 36415 COLL VENOUS BLD VENIPUNCTURE: CPT

## 2021-10-28 PROCEDURE — 96372 THER/PROPH/DIAG INJ SC/IM: CPT

## 2021-10-28 PROCEDURE — 74011250636 HC RX REV CODE- 250/636: Performed by: STUDENT IN AN ORGANIZED HEALTH CARE EDUCATION/TRAINING PROGRAM

## 2021-10-28 PROCEDURE — 65660000000 HC RM CCU STEPDOWN

## 2021-10-28 PROCEDURE — 74011250636 HC RX REV CODE- 250/636: Performed by: INTERNAL MEDICINE

## 2021-10-28 PROCEDURE — 74011250637 HC RX REV CODE- 250/637: Performed by: STUDENT IN AN ORGANIZED HEALTH CARE EDUCATION/TRAINING PROGRAM

## 2021-10-28 PROCEDURE — 90935 HEMODIALYSIS ONE EVALUATION: CPT

## 2021-10-28 PROCEDURE — 80048 BASIC METABOLIC PNL TOTAL CA: CPT

## 2021-10-28 PROCEDURE — 85025 COMPLETE CBC W/AUTO DIFF WBC: CPT

## 2021-10-28 PROCEDURE — 99232 SBSQ HOSP IP/OBS MODERATE 35: CPT | Performed by: FAMILY MEDICINE

## 2021-10-28 PROCEDURE — 99218 HC RM OBSERVATION: CPT

## 2021-10-28 PROCEDURE — 2709999900 HC NON-CHARGEABLE SUPPLY

## 2021-10-28 RX ORDER — LANOLIN ALCOHOL/MO/W.PET/CERES
200 CREAM (GRAM) TOPICAL DAILY
Status: DISCONTINUED | OUTPATIENT
Start: 2021-10-29 | End: 2021-10-29 | Stop reason: HOSPADM

## 2021-10-28 RX ORDER — THERA TABS 400 MCG
1 TAB ORAL DAILY
Status: DISCONTINUED | OUTPATIENT
Start: 2021-10-29 | End: 2021-10-29 | Stop reason: HOSPADM

## 2021-10-28 RX ADMIN — PYRIDOXINE HCL TAB 50 MG 50 MG: 50 TAB at 08:30

## 2021-10-28 RX ADMIN — DIPHENHYDRAMINE HYDROCHLORIDE 50 MG: 25 CAPSULE ORAL at 23:55

## 2021-10-28 RX ADMIN — RITONAVIR 100 MG: 100 TABLET, FILM COATED ORAL at 08:31

## 2021-10-28 RX ADMIN — Medication 10 ML: at 05:04

## 2021-10-28 RX ADMIN — LAMIVUDINE 150 MG: 150 TABLET, FILM COATED ORAL at 08:31

## 2021-10-28 RX ADMIN — Medication 6 MG: at 23:56

## 2021-10-28 RX ADMIN — Medication 10 ML: at 15:38

## 2021-10-28 RX ADMIN — HEPARIN SODIUM 5000 UNITS: 5000 INJECTION INTRAVENOUS; SUBCUTANEOUS at 05:04

## 2021-10-28 RX ADMIN — DARUNAVIR 800 MG: 800 TABLET, FILM COATED ORAL at 08:31

## 2021-10-28 RX ADMIN — HEPARIN SODIUM 5000 UNITS: 5000 INJECTION INTRAVENOUS; SUBCUTANEOUS at 15:37

## 2021-10-28 RX ADMIN — GABAPENTIN 200 MG: 100 CAPSULE ORAL at 23:58

## 2021-10-28 RX ADMIN — QUETIAPINE FUMARATE 25 MG: 25 TABLET ORAL at 23:57

## 2021-10-28 RX ADMIN — PANTOPRAZOLE SODIUM 40 MG: 20 TABLET, DELAYED RELEASE ORAL at 08:31

## 2021-10-28 RX ADMIN — Medication 10 ML: at 22:06

## 2021-10-28 RX ADMIN — SENNOSIDES 8.6 MG: 8.6 TABLET, COATED ORAL at 08:30

## 2021-10-28 RX ADMIN — DOXERCALCIFEROL 2 MCG: 4 INJECTION, SOLUTION INTRAVENOUS at 13:02

## 2021-10-28 RX ADMIN — DOLUTEGRAVIR SODIUM 50 MG: 50 TABLET, FILM COATED ORAL at 09:00

## 2021-10-28 NOTE — PROGRESS NOTES
[facility]  Progress Note    Patient: Silvia Davidson Age: 64 y.o. : 1960 MR#: [id number] SSN: [id number]  Date: 10/28/2021     Subjective/24-hour events:     Nothing new/acute. Assessment:   Acute respiratory failure with hypoxia and hypercapnea  ESRD  Volume overload/flash pulmonary edema  Lactic acidosis  Anemia of chronic disease  HIV  Schizophrenia  Hx Hep B and C  Hx poor compliance    Plan:   Antibiotic therapy discontinued - monitor off. Antiretroviral therapy as ordered. HD per nephrology. Patient states that remained in [place] and, if this is the case, she will need outpatient hemodialysis arranged prior to discharge. Disposition TBD - patient appears to be homeless. Per discussion with care management, patient already has [place] in place. Case discussed with:  [x]Patient  []Family  [x]Nursing  []Case Management  DVT Prophylaxis:  []Lovenox  []Hep SQ  []SCDs  []Coumadin   []On Heparin gtt    Objective:   VS:   Visit Vitals  BP (!) 162/89   Pulse 83   Temp 98 °F (36.7 °C) (Oral)   Resp 18   Ht 5' 1\" (1.549 m)   Wt 43.6 kg (96 lb 1.6 oz)   SpO2 98%   BMI 18.16 kg/m²      Tmax/24hrs: Temp (24hrs), Av.2 °F (36.8 °C), Min:97.8 °F (36.6 °C), Max:98.7 °F (37.1 °C)      Intake/Output Summary (Last 24 hours) at 10/28/2021 1452  Last data filed at 10/28/2021 1350  Gross per 24 hour   Intake 340 ml   Output 1925 ml   Net -1585 ml       General: Chronically ill-appearing but nontoxic. In NAD. Cardiovascular:  RRR. Pulmonary:  Lungs clear bilaterally, no wheezes. GI:  Abdomen soft, NTTP. Extremities:  Warm, no edema or ischemia. Neuro:  Awake and alert. Moves extremities spontaneously.     Labs:    Recent Results (from the past 24 hour(s))   CBC WITH AUTOMATED DIFF    Collection Time: 10/28/21  1:26 AM   Result Value Ref Range    WBC 3.9 (L) 4.6 - 13.2 K/uL    RBC 3.00 (L) 4.20 - 5.30 M/uL    HGB 8.8 (L) 12.0 - 16.0 g/dL    HCT 28.8 (L) 35.0 - 45.0 %    MCV 96.0 78.0 - 100.0 FL    MCH 29.3 24.0 - 34.0 PG    MCHC 30.6 (L) 31.0 - 37.0 g/dL    RDW 17.0 (H) 11.6 - 14.5 %    PLATELET 240 851 - 945 K/uL    MPV 10.2 9.2 - 11.8 FL    NEUTROPHILS 52 40 - 73 %    LYMPHOCYTES 26 21 - 52 %    MONOCYTES 11 (H) 3 - 10 %    EOSINOPHILS 9 (H) 0 - 5 %    BASOPHILS 1 0 - 2 %    ABS. NEUTROPHILS 2.1 1.8 - 8.0 K/UL    ABS. LYMPHOCYTES 1.0 0.9 - 3.6 K/UL    ABS. MONOCYTES 0.4 0.05 - 1.2 K/UL    ABS. EOSINOPHILS 0.4 0.0 - 0.4 K/UL    ABS.  BASOPHILS 0.0 0.0 - 0.1 K/UL    DF AUTOMATED     METABOLIC PANEL, BASIC    Collection Time: 10/28/21  1:26 AM   Result Value Ref Range    Sodium 137 136 - 145 mmol/L    Potassium 4.4 3.5 - 5.5 mmol/L    Chloride 103 100 - 111 mmol/L    CO2 29 21 - 32 mmol/L    Anion gap 5 3.0 - 18 mmol/L    Glucose 91 74 - 99 mg/dL    BUN 46 (H) 7.0 - 18 MG/DL    Creatinine 6.62 (H) 0.6 - 1.3 MG/DL    BUN/Creatinine ratio 7 (L) 12 - 20      GFR est AA 8 (L) >60 ml/min/1.73m2    GFR est non-AA 6 (L) >60 ml/min/1.73m2    Calcium 8.4 (L) 8.5 - 10.1 MG/DL   PHOSPHORUS    Collection Time: 10/28/21  1:26 AM   Result Value Ref Range    Phosphorus 2.3 (L) 2.5 - 4.9 MG/DL       Signed By: Hazel Rodas MD     October 28, 2021

## 2021-10-28 NOTE — ROUTINE PROCESS
Received patient in bed, awake, alert and oriented. TDC intact. No complaints made, will continue to monitor. Report  Given by Sb Clayton RN.    6:23 AM  Due medications given. No complaints made. 7:57 AM  Bedside and Verbal shift change report given to Charlyne Cooks RN (oncoming nurse) by Martir Hughes (offgoing nurse). Report included the following information Kardex, ED Summary, Intake/Output and MAR.

## 2021-10-28 NOTE — PROGRESS NOTES
Discharge/Transition Planning    Patient seems confused when tried to interview. Left voicemail for daughter to return call. [facility]. Brian Thompsonl notes have daughter moving pt from [facility] in first week of March this year after pt was transient homeless in [facility] and a lengthy hospitalization for TB treatment. Pt has latent syphillis and some  dementia. Looks like was already set up for dialysis and was going to a [address], sat? Will verify everything when can speak with daughter.    Patient has [employer]

## 2021-10-28 NOTE — ROUTINE PROCESS
Bedside and Verbal shift change report given to [address] [place] [address] (oncoming nurse) by Amira Roman (offgoing nurse). Report included the following information SBAR, Kardex and MAR.

## 2021-10-28 NOTE — PROGRESS NOTES
Comprehensive Nutrition Assessment    Type and Reason for Visit: Initial, Positive nutrition screen    Nutrition Recommendations/Plan:   - Continue oral diet and add Nepro Shake, BID.  - Monitor and encourage po intake as tolerated. - Add daily renal multivitamin & thiamine supplementation. Nutrition Assessment:  Pt seen during HD today. Reports good appetite but did not get a chance to eat lunch today, otherwise with good intake. Pt reports homelessness and alcohol use PTA but also states she has memory problems and that today was her first HD treatment ever. Pt previously on HD per chart, therefore question accuracy of patients statements. Malnutrition Assessment:  Malnutrition Status:  Insufficient data      Nutrition History and Allergies: PMHx- HIV, pulmonary TB, substance use disorder, hepatitis B & C, ESRD on HD, schizophrenia. Per H&P pt lives with her daughter PTA however pt stating she is homeless. Pt pleasant but a bit erratic and difficult to understand. Pt reports wt loss of unknown amount. Wt hx per chart 117# (6/14/15), 108# (3/3/21) & 96# (10/28/21). -12#, 11% x 7 months. NKFA. Estimated Daily Nutrient Needs:  Energy (kcal): 6002-7083; Weight Used for Energy Requirements: Current (44 kg)  Protein (g): 53-66; Weight Used for Protein Requirements: Current (1.2-1.5)  Fluid (ml/day): 750-1500; Method Used for Fluid Requirements: Standard renal    Nutrition Related Findings:  BM 10/25. Meds: remeron, vitamin B6, senna      Wounds:    None       Current Nutrition Therapies:  ADULT DIET Regular; No Salt Added (3-4 gm); Low Potassium (Less than 3000 mg/day);  Low Phosphorus (Less than 1000 mg); 60 to 80 gm    Anthropometric Measures:  · Height:  5' 1\" (154.9 cm)  · Current Body Wt:  43.6 kg (96 lb 1.9 oz)   · Admission Body Wt:  108 lb (pt stated)    · Usual Body Wt:  49 kg (108 lb) (3/3/21 per chart)     · Ideal Body Wt:  105 lbs:  91.5 %   · BMI Category:  Underweight (BMI less than 18.5) Nutrition Diagnosis:   · Increased nutrient needs related to renal dysfunction as evidenced by dialysis    Nutrition Interventions:   Food and/or Nutrient Delivery: Continue current diet, Start oral nutrition supplement, Vitamin supplement  Nutrition Education and Counseling: No recommendations at this time, Education not indicated  Coordination of Nutrition Care: Continue to monitor while inpatient    Goals:  PO nutrition intake will meet >75% of patient estimated nutritional needs within the next 7 days.        Nutrition Monitoring and Evaluation:   Behavioral-Environmental Outcomes: None identified  Food/Nutrient Intake Outcomes: Food and nutrient intake, Supplement intake, Vitamin/mineral intake  Physical Signs/Symptoms Outcomes: Biochemical data, Fluid status or edema, Meal time behavior, Nutrition focused physical findings, Weight    Discharge Planning:    Continue oral nutrition supplement, Continue current diet     Electronically signed by Stevie Beach RD on 10/28/2021 at 2:42 PM    Contact: [phone]

## 2021-10-28 NOTE — DIALYSIS
JASMYNE        ACUTE HEMODIALYSIS FLOW SHEET      HEMODIALYSIS ORDERS: Physician: James Moralez     Dialyzer: revaclear   Duration: 3.5 hr  BFR: 400   DFR: 800   Dialysate:  Temp 36-37*C  K+   2    Ca+  2.5 Na 138 Bicarb 35   Weight:  43.6 kg    Patient Chart [x]     Unable to Obtain []   Dry weight/UF Goal: 3000   Access: right chest TDC    Heparin:  [x]  Bolus 1500 Units  +   [x] Hourly 500 Units        Catheter locking solution: heparin    Pre BP:   131/95    Pulse:     90       Respirations: 18  Temperature:   97.9   Labs: Pre        Post:         [x] N/A   Additional Orders(medications, blood products, hypotension management):  hectorol     [x] Jasmyne Consent Verified     CATHETER ACCESS: []N/A   [x]Right chest   []Left   []IJ     []Fem   []transhepatic   [] First use X-ray verified     [x]Permanent                [] Temporary   [x]No S/S infection  []Redness  []Drainage []Cultured []Swelling []Pain   [x]Medical Aseptic Prep Utilized   []Dressing Changed  [x] Biopatch  Date: 10/27/21       []Clotted   [x]Patent   Flows: [x]Good  []Poor  [x]Reversed   If access problem,  notified: []Yes    [x]N/A           GRAFT/FISTULA ACCESS:  [x]N/A                             GENERAL ASSESSMENT:      LUNGS:  Rate 18 SaO2%        [] N/A    [x] Clear  [] Coarse  [] Crackles  [] Wheezing        [] Diminished     Location : []RLL   []LLL    []RUL  []PAYAL     Cough: []Productive  []Dry  [x]N/A   Respirations:  [x]Easy  []Labored     Therapy:   [x]RA  []NC  l/min    Mask: []NRB []Venti       O2%                  []Ventilator  []Intubated  [] Trach  [] BiPaP     CARDIAC: [x]Regular      [] Irregular   [] Pericardial Rub  [] JVD        []  Monitored  [] Bedside  [] Remotely monitored [x] N/A      EDEMA: [] None   [x]Generalized  [] Pitting [] 1    [] 2    [] 3    [] 4                 [] Facial  [] Pedal  []  UE  [] LE     SKIN:   [x] Warm   [] Hot     [] Cold   [x] Dry     [] Pale   [] Diaphoretic                  [] Flushed  [] Jaundiced [] Cyanotic  [] Rash  [] Weeping     LOC:    [x] Alert      [x]Oriented:    [x] Person     [x] Place  []Time               [] Confused  [] Lethargic  [] Medicated  [] Non-responsive     GI / ABDOMEN:  [] Flat    [] Distended    [x] Soft    [] Firm   []  Obese                             [] Diarrhea  [x] Bowel Sounds  [] Nausea  [] Vomiting       / URINE ASSESSMENT:[] Voiding   [x] Oliguria  [] Anuria   []  Aragon     [] Incontinent    []  Incontinent Brief      []  Bathroom Privileges       PAIN: [x] 0 []1  []2   []3   []4   []5   []6   []7   []8   []9   []10              Scale 0-10  Action/Follow Up:      MOBILITY:  [] Amb    [] Amb/Assist    [x] Bed    [] Wheelchair  [] Stretcher      All Vitals and Treatment Details on Attached [address]: [facility]          Room # 203/01      [] 1st Time Acute  [] Stat  [x] Routine  [] Urgent     [x] Acute Room  []  Bedside  [] ICU/CCU  [] ER   Isolation Precautions:   There are currently no Active Isolations      Special Considerations:         [] Blood Consent Verified [x]N/A      ALLERGIES:   Allergies   Allergen Reactions    Aspirin Hives    Pcn [Penicillins] Hives               Code Status:Full Code        Hepatitis Status:                       Lab Results   Component Value Date/Time    Hepatitis B surface Ag 0.27 10/24/2021 08:00 PM    Hepatitis B surface Ab 20.72 10/24/2021 08:00 PM    Hep B Core Ab, total Positive (A) 10/24/2021 08:00 PM                     Current Labs:   Lab Results   Component Value Date/Time    Sodium 137 10/28/2021 01:26 AM    Potassium 4.4 10/28/2021 01:26 AM    Chloride 103 10/28/2021 01:26 AM    CO2 29 10/28/2021 01:26 AM    Anion gap 5 10/28/2021 01:26 AM    Glucose 91 10/28/2021 01:26 AM    BUN 46 (H) 10/28/2021 01:26 AM    Creatinine 6.62 (H) 10/28/2021 01:26 AM    BUN/Creatinine ratio 7 (L) 10/28/2021 01:26 AM    GFR est AA 8 (L) 10/28/2021 01:26 AM    GFR est non-AA 6 (L) 10/28/2021 01:26 AM    Calcium 8.4 (L) 10/28/2021 01:26 AM Lab Results   Component Value Date/Time    WBC 3.9 (L) 10/28/2021 01:26 AM    HGB 8.8 (L) 10/28/2021 01:26 AM    HCT 28.8 (L) 10/28/2021 01:26 AM    PLATELET 821 85/16/2248 01:26 AM    MCV 96.0 10/28/2021 01:26 AM                                                                                     DIET:   DIET ADULT       PRIMARY NURSE REPORT: First initial/Last name/Title      Pre Dialysis: Shayy Paula RN     Time: 9211      EDUCATION:    [x] Patient [] Other         Knowledge Basis: []None [x]Minimal [] Substantial   Barriers to learning  [x]N/A   [] Access Care     [] S&S of infection     [] Fluid Management     []K+     [x]Procedural    []Albumin     [] Medications     [] Tx Options     [] Transplant     [] Diet     [] Other   Teaching Tools:  [x] Explain  [x] Demo  [] Handouts [] Video  Patient response:  [x] Verbalized understanding  [] Teach back  [] Return demonstration [] Requires follow up   Inappropriate due to:            [x]Time Out/Safety Check  [x]Extracorporeal Circuit Tested for integrity       RO/HEMODIALYSIS MACHINE SAFETY CHECKS  Before each treatment:     Machine Number:                   [facility]                                   [x] Unit Machine # 5 with centralized RO                                       Alarm Test:  Pass time 0911               [x] RO/Machine Log Complete      Temp   36             Dialysate: pH  7.4 Conductivity: Meter   13.9     HD Machine   13.7                  TCD: 13.9  Dialyzer Lot # [id number]            Blood Tubing Lot # [id number]          Saline Lot #  [id number]     CHLORINE TESTING-Before each treatment and every 4 hours    Total Chlorine: [x] less than 0.1 ppm  Time: 0900 4 Hr/2nd Check Time: 1300   (if greater than 0.1 ppm from Primary then every 30 minutes from Secondary)     TREATMENT INITIATION  with Dialysis Precautions:   [x] All Connections Secured                 [x] Saline Line Double Clamped   [x] Venous Parameters Set [x] Arterial Parameters Set    [x] Prime Given 250ml                          [x]Air Foam Detector Engaged      Treatment Initiation Note:   Pt arrived to HD unit via bed. A&Ox3 with confusion to time. Resp even/unlabored on RA. Right chest TDC assessed with no s/s complications. HD initiated without difficulty. Heparin bolus administered per order. During Treatment Notes:  1000 Pt awake and alert. Face and access in view with connections secure. 1015 Pt awake and alert. Face and access in view with connections secure. 1030 Pt awake and alert. Face and access in view with connections secure. 1045 Received order from Dr. Natalai Gasca to decrease UF goal to 1.5 liters (as tolerated). Pt awake and alert. Face and access in view with connections secure. 1100 Pt awake and alert. Face and access in view with connections secure. 1115 Pt awake and alert. Face and access in view with connections secure. 1130 Pt awake and alert. Face and access in view with connections secure. 1145 Pt awake and alert. Face and access in view with connections secure. 1200 Pt awake and alert. Face and access in view with connections secure. 1215 Pt awake and alert. Face and access in view with connections secure. 1230 Pt awake and alert. Face and access in view with connections secure. 1245 Pt awake and alert. Face and access in view with connections secure. 1300 Pt awake and alert. Face and access in view with connections secure. 1315 Pt awake and alert. Face and access in view with connections secure. 1330 Pt awake and alert. Face and access in view with connections secure.            Medication Dose Volume Route Time [facility] name Title   hectorol    heparin 2 mcg    1500 units 1 ml    1.5 ml Dialysis    dialysis 1302    0958 P Ed Hernandez RN    RN                   Post Assessment:   Dialyzer Cleared: [] Good [x] Fair  [] Poor  Blood processed:  77.1 L  UF Removed  2000 mL  POst BP:   162/89 Pulse: 83        Respirations: 18  Temperature: 98.0 Lungs:     [x] Clear      [] Course         [] Crackles    [] Wheezing         [] Diminished   Post Tx Vascular Access:      N/A Cardiac:   [x] Regular   [] Irregular   [] Monitor  [x] N/A        Catheter:   Locking solution: Heparin 1:1000   Art. 1.9  Vic. 1.9     Skin:   Pain:   [x] Warm  [x] Dry [] Diaphoretic    [] Flushed    [] Pale [] Cyanotic [x]0  []1  []2   []3  []4   []5   []6   []7   []8   []9   []10     Post Treatment Note:  Pt tolerated treatment well. Net 1.5 L UF removed.      POST TREATMENT PRIMARY NURSE HANDOFF REPORT:     First initial/Last name/Title         Post Dialysis: Rosa Rose RN      Time:  9595     Abbreviations: AVG-arterial venous graft, AVF-arterial venous fistula, IJ-Internal Jugular, Subcl-Subclavian, Fem-Femoral, Tx-treatment, AP/HR-apical heart rate, DFR-dialysate flow rate, BFR-blood flow rate, AP-arterial pressure, -venous pressure, UF-ultrafiltrate, TMP-transmembrane pressure, Vic-Venous, Art-Arterial, RO-Reverse Osmosis

## 2021-10-28 NOTE — PROGRESS NOTES
Bedside shift report [facility]

## 2021-10-28 NOTE — PROGRESS NOTES
Progress Note    Natalia Martinez  64 y.o. Admit Date: 10/24/2021  Active Problems:    Hypoxia (10/25/2021) POA: Unknown      ESRD (end stage renal disease) on dialysis ([facility] (10/25/2021) POA: Unknown      Volume overload (10/25/2021) POA: Unknown      Hypertension (10/25/2021) POA: Unknown      Respiratory failure ([facility] (10/25/2021) POA: Unknown            Subjective:     Patient feels good, no SOB, BP drops, had to stop UF, Having high negative arterial pressure. Now she says she does not know where she will be going once she is Discharged, her Daughter does not have any place for her, not sure if she ever going back to [facility]. A comprehensive review of systems was negative except for that written in the History of Present Illness.     Objective:     Visit Vitals  /77   Pulse (!) 55   Temp 97.9 °F (36.6 °C) (Oral)   Resp 18   Ht 5' 1\" (1.549 m)   Wt 43.6 kg (96 lb 1.6 oz)   SpO2 98%   BMI 18.16 kg/m²         Intake/Output Summary (Last 24 hours) at 10/28/2021 1157  Last data filed at 10/28/2021 9015  Gross per 24 hour   Intake 340 ml   Output 425 ml   Net -85 ml       Current Facility-Administered Medications   Medication Dose Route Frequency Provider Last Rate Last Admin    hydrALAZINE (APRESOLINE) 20 mg/mL injection 10 mg  10 mg IntraVENous Q6H PRN Campos Jacob MD        epoetin romelia-epbx (RETACRIT) injection 10,000 Units  10,000 Units SubCUTAneous Q TUE, THU & SAT Cydney Billings MD   10,000 Units at 10/26/21 2253    doxercalciferoL (HECTOROL) 4 mcg/2 mL injection 2 mcg  2 mcg IntraVENous Q TU, TH & SAT Cydney Billings MD   2 mcg at 10/26/21 1845    influenza vaccine 2021-22 (6 mos+)(PF) (FLUARIX/FLULAVAL/FLUZONE QUAD) injection 0.5 mL  1 Each IntraMUSCular PRIOR TO DISCHARGE May Castillo DO        darunavir ethanolate (PREZISTA) tablet 800 mg  800 mg Oral DAILY WITH BREAKFAST May Castillo DO   800 mg at 10/28/21 0831    dolutegravir (TIVICAY) tablet 50 mg  50 mg Oral DAILY May Baltazar, DO   50 mg at 10/28/21 0900    pantoprazole (PROTONIX) tablet 40 mg  40 mg Oral ACB May Castillo, DO   40 mg at 10/28/21 0831    gabapentin (NEURONTIN) capsule 200 mg  200 mg Oral QHS May Castillo, DO   200 mg at 10/27/21 2230    lamiVUDine (EPIVIR) tablet 150 mg  150 mg Oral DAILY May Castillo, DO   150 mg at 10/28/21 0831    melatonin tablet 6 mg  6 mg Oral QHS May Castillo, DO   6 mg at 10/27/21 2230    mirtazapine (REMERON) tablet 7.5 mg  7.5 mg Oral QHS May Castillo, DO   7.5 mg at 10/27/21 2230    pyridoxine (vitamin B6) (VITAMIN B-6) tablet 50 mg  50 mg Oral DAILY May Castillo, DO   50 mg at 10/28/21 0830    QUEtiapine (SEROquel) tablet 25 mg  25 mg Oral QHS May Castillo, DO   25 mg at 10/27/21 2230    ritonavir (NORVIR) tablet 100 mg  100 mg Oral DAILY WITH BREAKFAST May Castillo, DO   100 mg at 10/28/21 0831    senna (SENOKOT) tablet 8.6 mg  1 Tablet Oral DAILY May Castillo, DO   8.6 mg at 10/28/21 0830    sodium chloride (NS) flush 5-40 mL  5-40 mL IntraVENous Q8H May Castillo, DO   10 mL at 10/28/21 0504    sodium chloride (NS) flush 5-40 mL  5-40 mL IntraVENous PRN May Castillo, DO        acetaminophen (TYLENOL) tablet 650 mg  650 mg Oral Q6H PRN May Castillo DO   650 mg at 10/25/21 1117    Or    acetaminophen (TYLENOL) suppository 650 mg  650 mg Rectal Q6H PRN May Castillo, DO        polyethylene glycol (MIRALAX) packet 17 g  17 g Oral DAILY PRN May Baltazar, DO        heparin (porcine) injection 5,000 Units  5,000 Units SubCUTAneous Q8H May Castillo, DO   5,000 Units at 10/28/21 0504    diphenhydrAMINE (BENADRYL) capsule 50 mg  50 mg Oral Q6H PRN Vern Aquino MD   50 mg at 10/27/21 4151    sodium chloride (NS) flush 5-10 mL  5-10 mL IntraVENous PRN May Castillo DO        [Held by provider] nitroglycerin (TRIDIL) 400 mcg/ml infusion  100 mcg/min IntraVENous TITRATE May Castillo DO   Stopped at 10/25/21 1355    heparin (porcine) 1,000 unit/mL injection 4,000 Units  4,000 Units Hemodialysis DIALYSIS PRN Sujata John DO   4,000 Units at 10/26/21 1300        Physical Exam:     Physical Exam:   General:  Alert, cooperative, no distress, appears stated age. Neck: Supple, symmetrical, trachea midline, no adenopathy, thyroid: no enlargement/tenderness/nodules, no carotid bruit and no JVD. Lungs:   Clear to auscultation bilaterally. Heart:  Regular rate and rhythm, S1, S2 normal, no murmur, click, rub or gallop. Abdomen:   Soft, non-tender. Bowel sounds normal. No masses,  No organomegaly. Extremities: Extremities normal, atraumatic, no cyanosis or edema, HD catheter is well dressed. Skin: Skin color, texture, turgor normal. No rashes or lesions         Data Review:    CBC w/Diff    Recent Labs     10/28/21  0126 10/27/21  0314 10/27/21  0314 10/26/21  0425 10/26/21  0425   WBC 3.9*  --  4.1*  --  4.5*   RBC 3.00*  --  3.06*  --  2.52*   HGB 8.8*  --  8.7*  --  7.3*   HCT 28.8*  --  29.1*  --  23.9*   MCV 96.0   < > 95.1   < > 94.8   MCH 29.3   < > 28.4   < > 29.0   MCHC 30.6*   < > 29.9*   < > 30.5*   RDW 17.0*   < > 16.7*   < > 16.7*    < > = values in this interval not displayed. Recent Labs     10/28/21  0126 10/27/21  0314 10/27/21  0314 10/26/21  0425 10/26/21  0425   MONOS 11*  --  11*  --  11*   EOS 9*  --  13*  --  4   BASOS 1   < > 1   < > 1   RDW 17.0*   < > 16.7*   < > 16.7*    < > = values in this interval not displayed.         Comprehensive Metabolic Profile    Recent Labs     10/28/21  0126 10/27/21  0314 10/26/21  0425    136 138   K 4.4 3.1* 4.0    104 105   CO2 29 26 24   BUN 46* 26* 37*   CREA 6.62* 4.00* 4.92*    Recent Labs     10/28/21  0126 10/27/21  0314 10/26/21  0425   CA 8.4* 8.3* 7.7*  7.6*   PHOS 2.3*  --  4.3                        Impression:       Active Hospital Problems    Diagnosis Date Noted    Hypoxia 10/25/2021    ESRD (end stage renal disease) on dialysis ([facility] 10/25/2021    Volume overload 10/25/2021    Hypertension 10/25/2021    Respiratory failure ([facility] 10/25/2021            Plan:     Minimal UF, on 2 K, 2.5 ca bath, Retacrit & Hectorol as ordered, reassess her EDW. Discussed with  to initiate Disposition ,may need to find out OP dialysis center if she does not go back to [place].       Efren Christiansen MD

## 2021-10-29 VITALS
WEIGHT: 96.1 LBS | TEMPERATURE: 98.6 F | RESPIRATION RATE: 16 BRPM | HEIGHT: 61 IN | BODY MASS INDEX: 18.14 KG/M2 | OXYGEN SATURATION: 97 % | DIASTOLIC BLOOD PRESSURE: 90 MMHG | HEART RATE: 86 BPM | SYSTOLIC BLOOD PRESSURE: 131 MMHG

## 2021-10-29 PROCEDURE — 99238 HOSP IP/OBS DSCHRG MGMT 30/<: CPT | Performed by: FAMILY MEDICINE

## 2021-10-29 PROCEDURE — 74011250636 HC RX REV CODE- 250/636: Performed by: STUDENT IN AN ORGANIZED HEALTH CARE EDUCATION/TRAINING PROGRAM

## 2021-10-29 PROCEDURE — 74011250636 HC RX REV CODE- 250/636: Performed by: INTERNAL MEDICINE

## 2021-10-29 PROCEDURE — 74011250637 HC RX REV CODE- 250/637: Performed by: FAMILY MEDICINE

## 2021-10-29 PROCEDURE — 99218 HC RM OBSERVATION: CPT

## 2021-10-29 PROCEDURE — 74011250637 HC RX REV CODE- 250/637: Performed by: STUDENT IN AN ORGANIZED HEALTH CARE EDUCATION/TRAINING PROGRAM

## 2021-10-29 PROCEDURE — 96372 THER/PROPH/DIAG INJ SC/IM: CPT

## 2021-10-29 RX ORDER — TENOFOVIR DISOPROXIL FUMARATE 300 MG/1
300 TABLET, FILM COATED ORAL
COMMUNITY
End: 2022-02-18

## 2021-10-29 RX ORDER — RITONAVIR 100 MG/1
100 TABLET ORAL DAILY
COMMUNITY
End: 2022-02-18

## 2021-10-29 RX ADMIN — PANTOPRAZOLE SODIUM 40 MG: 20 TABLET, DELAYED RELEASE ORAL at 09:04

## 2021-10-29 RX ADMIN — PYRIDOXINE HCL TAB 50 MG 50 MG: 50 TAB at 09:04

## 2021-10-29 RX ADMIN — EPOETIN ALFA-EPBX 10000 UNITS: 10000 INJECTION, SOLUTION INTRAVENOUS; SUBCUTANEOUS at 00:02

## 2021-10-29 RX ADMIN — DOLUTEGRAVIR SODIUM 50 MG: 50 TABLET, FILM COATED ORAL at 09:21

## 2021-10-29 RX ADMIN — RITONAVIR 100 MG: 100 TABLET, FILM COATED ORAL at 09:21

## 2021-10-29 RX ADMIN — Medication 200 MG: at 09:04

## 2021-10-29 RX ADMIN — LAMIVUDINE 150 MG: 150 TABLET, FILM COATED ORAL at 09:21

## 2021-10-29 RX ADMIN — Medication 10 ML: at 06:02

## 2021-10-29 RX ADMIN — HEPARIN SODIUM 5000 UNITS: 5000 INJECTION INTRAVENOUS; SUBCUTANEOUS at 00:03

## 2021-10-29 RX ADMIN — DIPHENHYDRAMINE HYDROCHLORIDE 50 MG: 25 CAPSULE ORAL at 05:54

## 2021-10-29 RX ADMIN — MIRTAZAPINE 7.5 MG: 15 TABLET, FILM COATED ORAL at 00:00

## 2021-10-29 RX ADMIN — THERA TABS 1 TABLET: TAB at 09:04

## 2021-10-29 RX ADMIN — SENNOSIDES 8.6 MG: 8.6 TABLET, COATED ORAL at 09:04

## 2021-10-29 RX ADMIN — DARUNAVIR 800 MG: 800 TABLET, FILM COATED ORAL at 09:21

## 2021-10-29 NOTE — PROGRESS NOTES
Progress Note    Gerardo Ash  64 y.o. Admit Date: 10/24/2021  Active Problems:    Hypoxia (10/25/2021) POA: Unknown      ESRD (end stage renal disease) on dialysis ([facility] (10/25/2021) POA: Unknown      Volume overload (10/25/2021) POA: Unknown      Hypertension (10/25/2021) POA: Unknown      Respiratory failure ([facility] (10/25/2021) POA: Unknown            Subjective:     Patient feels ok, mild lower abdominal pain, constipated, says she is homeless & will be on the street once Discharged.  Found her Daughter & she is not homeless , lives with her Daughter, she has Dementia, She has OP Dialysis Unit at [place]. .       A comprehensive review of systems was negative except for that written in the History of Present Illness.     Objective:     Visit Vitals  BP (!) 153/45 (BP 1 Location: Right arm, BP Patient Position: At rest)   Pulse 100   Temp 97.7 °F (36.5 °C)   Resp 18   Ht 5' 1\" (1.549 m)   Wt 43.6 kg (96 lb 1.6 oz)   SpO2 100%   BMI 18.16 kg/m²         Intake/Output Summary (Last 24 hours) at 10/29/2021 1219  Last data filed at 10/29/2021 0056  Gross per 24 hour   Intake    Output 1750 ml   Net -1750 ml       Current Facility-Administered Medications   Medication Dose Route Frequency Provider Last Rate Last Admin    thiamine HCL (B-1) tablet 200 mg  200 mg Oral DAILY Haresh Ramos MD   200 mg at 10/29/21 3707    therapeutic multivitamin (THERAGRAN) tablet 1 Tablet  1 Tablet Oral DAILY Haresh Ramos MD   1 Tablet at 10/29/21 0904    hydrALAZINE (APRESOLINE) 20 mg/mL injection 10 mg  10 mg IntraVENous Q6H PRN Haresh Ramos MD        epoetin romelia-epbx (RETACRIT) injection 10,000 Units  10,000 Units SubCUTAneous Q TUE, THU & SAT Crystal Umanzor MD   10,000 Units at 10/29/21 0002    doxercalciferoL (HECTOROL) 4 mcg/2 mL injection 2 mcg  2 mcg IntraVENous Once per day on Tue Thu Sat Crystal Umanzor MD   2 mcg at 10/28/21 1302    influenza vaccine 2021-22 (6 mos+)(PF) (FLUARIX/FLULAVAL/FLUZONE QUAD) injection 0.5 mL  1 Each IntraMUSCular PRIOR TO DISCHARGE May Castillo, DO        darunavir ethanolate (PREZISTA) tablet 800 mg  800 mg Oral DAILY WITH BREAKFAST May Castillo, DO   800 mg at 10/29/21 3524    dolutegravir (TIVICAY) tablet 50 mg  50 mg Oral DAILY May Castillo, DO   50 mg at 10/29/21 9334    pantoprazole (PROTONIX) tablet 40 mg  40 mg Oral ACB May Castillo, DO   40 mg at 10/29/21 7308    gabapentin (NEURONTIN) capsule 200 mg  200 mg Oral QHS May Castillo, DO   200 mg at 10/28/21 2358    lamiVUDine (EPIVIR) tablet 150 mg  150 mg Oral DAILY May Castillo, DO   150 mg at 10/29/21 1024    melatonin tablet 6 mg  6 mg Oral QHS May Castillo, DO   6 mg at 10/28/21 2356    mirtazapine (REMERON) tablet 7.5 mg  7.5 mg Oral QHS May Castillo, DO   7.5 mg at 10/29/21 0000    pyridoxine (vitamin B6) (VITAMIN B-6) tablet 50 mg  50 mg Oral DAILY May Castillo, DO   50 mg at 10/29/21 0229    QUEtiapine (SEROquel) tablet 25 mg  25 mg Oral QHS May Castillo, DO   25 mg at 10/28/21 2357    ritonavir (NORVIR) tablet 100 mg  100 mg Oral DAILY WITH BREAKFAST May Castillo DO   100 mg at 10/29/21 9832    senna (SENOKOT) tablet 8.6 mg  1 Tablet Oral DAILY May Castillo, DO   8.6 mg at 10/29/21 0904    sodium chloride (NS) flush 5-40 mL  5-40 mL IntraVENous Q8H May Castillo, DO   10 mL at 10/29/21 0602    sodium chloride (NS) flush 5-40 mL  5-40 mL IntraVENous PRN May Castillo, DO        acetaminophen (TYLENOL) tablet 650 mg  650 mg Oral Q6H PRN May Castillo DO   650 mg at 10/25/21 9091    Or    acetaminophen (TYLENOL) suppository 650 mg  650 mg Rectal Q6H PRN May Castillo N,         polyethylene glycol (MIRALAX) packet 17 g  17 g Oral DAILY PRN May Healy,         heparin (porcine) injection 5,000 Units  5,000 Units SubCUTAneous Q8H May Castillo, DO   5,000 Units at 10/29/21 0003    diphenhydrAMINE (BENADRYL) capsule 50 mg  50 mg Oral Q6H PRN Lolita Engle MD   50 mg at 10/29/21 0554    sodium chloride (NS) flush 5-10 mL  5-10 mL IntraVENous PRN Lane MORRIS DO        heparin (porcine) 1,000 unit/mL injection 4,000 Units  4,000 Units Hemodialysis DIALYSIS PRN Danielle Goyal DO   4,000 Units at 10/26/21 1300        Physical Exam:     Physical Exam:   General:  Alert, cooperative, no distress, appears stated age. Lungs:   Clear to auscultation bilaterally. Heart:  Regular rate and rhythm, S1, S2 normal, no murmur, click, rub or gallop. Abdomen:   Soft, non-tender. Bowel sounds normal. No masses,  No organomegaly. Extremities: Extremities normal, atraumatic, no cyanosis or edema, HD catheter is well dressed   Skin: Skin color, texture, turgor normal. No rashes or lesions         Data Review:    CBC w/Diff    Recent Labs     10/28/21  0126 10/27/21  0314 10/27/21  0314   WBC 3.9*  --  4.1*   RBC 3.00*  --  3.06*   HGB 8.8*  --  8.7*   HCT 28.8*  --  29.1*   MCV 96.0   < > 95.1   MCH 29.3   < > 28.4   MCHC 30.6*   < > 29.9*   RDW 17.0*   < > 16.7*    < > = values in this interval not displayed. Recent Labs     10/28/21  0126 10/27/21  0314 10/27/21  0314   MONOS 11*  --  11*   EOS 9*  --  13*   BASOS 1   < > 1   RDW 17.0*   < > 16.7*    < > = values in this interval not displayed.         Comprehensive Metabolic Profile    Recent Labs     10/28/21  0126 10/27/21  0314    136   K 4.4 3.1*    104   CO2 29 26   BUN 46* 26*   CREA 6.62* 4.00*    Recent Labs     10/28/21  0126 10/27/21  0314   CA 8.4* 8.3*   PHOS 2.3*  --                 }        Impression:       Active Hospital Problems    Diagnosis Date Noted    Hypoxia 10/25/2021    ESRD (end stage renal disease) on dialysis ([facility] 10/25/2021    Volume overload 10/25/2021    Hypertension 10/25/2021    Respiratory failure ([facility] 10/25/2021            Plan:     No Need for any Dialysis, Family may come & Take her home & continue OP dialysis, if not discharged then will dialyze her tomorrow. ,will give Miralax for constipation, Discussed with her care manager.       Yosef Santa MD

## 2021-10-29 NOTE — PROGRESS NOTES
Discharge order noted for today. Pt has been accepted to [address] [place] [address] agency. Met with daughter and are agreeable to the transition plan today. Transport has been arranged through son in law. Patient's discharge summary and home health  orders have been forwarded to [facility]. Updated bedside RN, ,  to the transition plan.   Discharge information has been documented on the AVS.

## 2021-10-29 NOTE — PROGRESS NOTES
Reason for Admission:  Hypoxia [R09.02]                 RUR Score:    17            Plan for utilizing home health:                          Likelihood of Readmission:   Moderate                         Do you (patient/family) have any concerns for transition/discharge?  no    Transition of Care Plan:       Initial assessment completed with relative(s). Patient has early onset dementia Cognitive status of patient: disoriented. Face sheet information confirmed:  yes. The patient designates unable to participate in her discharge plan and to receive any needed information. This patient lives in a single family home with daughter and other:  Son in law,  2 grandkids 21 and 25. Patient is able to navigate steps as needed. Prior to hospitalization, patient was considered to be independent with ADLs/IADLS : no . If not independent,  patient needs assist with : bathing, food preparation, cooking and grooming    Patient has a current ACP document on file: no      Healthcare Decision Maker:   Primary Decision Maker: Oxana Luisa Simpson - [phone]    Click here to complete [place] including selection of the Healthcare Decision Maker Relationship (ie \"Primary\")    The other:  Son in law will be available to transport patient home upon discharge. The patient already has none reported, and  medical equipment available in the home. Patient is currently active with home health. If active, agency name is [facility]. Patient has not stayed in a skilled nursing facility or rehab. Was  stay within last 60 days : no. This patient is on dialysis :yes    If yes, hemodialysis patient and receives treatment on Tuesday/Thursday/Saturday at [facility] time is 6am. Pt is transported to/from dialysis by family or medicaid. List of available Home Health agencies were provided and reviewed with the patient prior to discharge. Freedom of choice signed: yes, for [facility]. Currently, the discharge plan is Home with [address] [place] [address] [facility]. The patient states that she can obtain her medications from the pharmacy, and take her medications as directed. Patient's current insurance is [facility]       Care Management Interventions  PCP Verified by CM:  Yes  Mode of Transport at Discharge: Self (son in law)  Transition of Care Consult (CM Consult): Discharge Planning  Support Systems: Child(joanne)  Confirm Follow Up Transport: Family  The Plan for Transition of Care is Related to the Following Treatment Goals : home  Discharge Location  Discharge Placement: Home

## 2021-10-29 NOTE — ROUTINE PROCESS
Bedside and verbal report received from Luz Maria Galvez (offgoing nurse). Report included the following information; SBAR, MAR, LABS, Intake/output, Kardex, and summary of care. Patient resting quietly in bed. Call bell and phone in reach.

## 2021-10-29 NOTE — DISCHARGE INSTRUCTIONS
Patient Education        Home Blood Pressure Test: About This Test    DISCHARGE SUMMARY from Nurse    PATIENT INSTRUCTIONS:    After general anesthesia or intravenous sedation, for 24 hours or while taking prescription Narcotics:  · Limit your activities  · Do not drive and operate hazardous machinery  · Do not make important personal or business decisions  · Do  not drink alcoholic beverages  · If you have not urinated within 8 hours after discharge, please contact your surgeon on call. Report the following to your surgeon:  · Excessive pain, swelling, redness or odor of or around the surgical area  · Temperature over 100.5  · Nausea and vomiting lasting longer than 4 hours or if unable to take medications  · Any signs of decreased circulation or nerve impairment to extremity: change in color, persistent  numbness, tingling, coldness or increase pain  · Any questions    What to do at Home:  Recommended activity: Activity as tolerated, rest as needed     If you experience any of the following symptoms Fever greater than 100.4, Chest pain shortness of breath, Dizziness , Fainting please follow up with Primary Care Provider or go tot the nearest Emergency room. *  Please give a list of your current medications to your Primary Care Provider. *  Please update this list whenever your medications are discontinued, doses are      changed, or new medications (including over-the-counter products) are added. *  Please carry medication information at all times in case of emergency situations. These are general instructions for a healthy lifestyle:    No smoking/ No tobacco products/ Avoid exposure to second hand smoke  Surgeon General's Warning:  Quitting smoking now greatly reduces serious risk to your health.     Obesity, smoking, and sedentary lifestyle greatly increases your risk for illness    A healthy diet, regular physical exercise & weight monitoring are important for maintaining a healthy lifestyle    You may be retaining fluid if you have a history of heart failure or if you experience any of the following symptoms:  Weight gain of 3 pounds or more overnight or 5 pounds in a week, increased swelling in our hands or feet or shortness of breath while lying flat in bed. Please call your doctor as soon as you notice any of these symptoms; do not wait until your next office visit. The discharge information has been reviewed with the patient. The patient verbalized understanding. Discharge medications reviewed with the patient and appropriate educational materials and side effects teaching were provided. ___Patient armband removed and shredded  ________________________________________________________________________________________________________________________________What is it? A home blood pressure test allows you to keep track of your blood pressure at home. Blood pressure is a measure of the force of blood against the walls of your arteries. Blood pressure readings include two numbers, such as 130/80 (say \"130 over 80\"). The first number is the systolic pressure. The second number is the diastolic pressure. Why is this test done? You may do this test at home to:  · Find out if you have high blood pressure. · Track your blood pressure if you have high blood pressure. · Track how well medicine is working to reduce high blood pressure. · Check how lifestyle changes, such as weight loss and exercise, are affecting blood pressure. How do you prepare for the test?  For at least 30 minutes before you take your blood pressure, don't exercise, drink caffeine, or smoke. Empty your bladder before the test. Sit quietly with your back straight and both feet on the floor for at least 5 minutes. This helps you take your blood pressure while you feel comfortable and relaxed. How is the test done? · If your doctor recommends it, take your blood pressure twice a day.  Take it in the morning and evening. · Sit with your arm slightly bent and resting on a table so that your upper arm is at the same level as your heart. · Use the same arm each time you take your blood pressure. · Place the blood pressure cuff on the bare skin of your upper arm. You may have to roll up your sleeve, remove your arm from the sleeve, or take your shirt off. · Wrap the blood pressure cuff around your upper arm so that the lower edge of the cuff is about 1 inch above the bend of your elbow. · Do not move, talk, or text while you take your blood pressure. Follow the instructions that came with your blood pressure monitor. They might be different from the following. · Press the on/off button on the automatic monitor. Then you may need to wait until the screen says the monitor is ready. · Press the start button. The cuff will inflate and deflate by itself. · Your blood pressure numbers will appear on the screen. · Wait one minute and take your blood pressure again. · If your monitor does not automatically save your numbers, write them in your log book, along with the date and time. Follow-up care is a key part of your treatment and safety. Be sure to make and go to all appointments, and call your doctor if you are having problems. It's also a good idea to keep a list of the medicines you take. Where can you learn more? Go to http://www.lanier.com/  Enter C427 in the search box to learn more about \"Home Blood Pressure Test: About This Test.\"  Current as of: April 29, 2021               Content Version: 13.0  © 2006-2021 [employer]. Care instructions adapted under license by [employer] (which disclaims liability or warranty for this information). If you have questions about a medical condition or this instruction, always ask your healthcare professional. [place] [address] any warranty or liability for your use of this information.          Patient Education DASH Diet: Care Instructions  Your Care Instructions     The DASH diet is an eating plan that can help lower your blood pressure. DASH stands for Dietary Approaches to Stop Hypertension. Hypertension is high blood pressure. The DASH diet focuses on eating foods that are high in calcium, potassium, and magnesium. These nutrients can lower blood pressure. The foods that are highest in these nutrients are fruits, vegetables, low-fat dairy products, nuts, seeds, and legumes. But taking calcium, potassium, and magnesium supplements instead of eating foods that are high in those nutrients does not have the same effect. The DASH diet also includes whole grains, fish, and poultry. The DASH diet is one of several lifestyle changes your doctor may recommend to lower your high blood pressure. Your doctor may also want you to decrease the amount of sodium in your diet. Lowering sodium while following the DASH diet can lower blood pressure even further than just the DASH diet alone. Follow-up care is a key part of your treatment and safety. Be sure to make and go to all appointments, and call your doctor if you are having problems. It's also a good idea to know your test results and keep a list of the medicines you take. How can you care for yourself at home? Following the DASH diet  · Eat 4 to 5 servings of fruit each day. A serving is 1 medium-sized piece of fruit, ½ cup chopped or canned fruit, 1/4 cup dried fruit, or 4 ounces (½ cup) of fruit juice. Choose fruit more often than fruit juice. · Eat 4 to 5 servings of vegetables each day. A serving is 1 cup of lettuce or raw leafy vegetables, ½ cup of chopped or cooked vegetables, or 4 ounces (½ cup) of vegetable juice. Choose vegetables more often than vegetable juice. · Get 2 to 3 servings of low-fat and fat-free dairy each day. A serving is 8 ounces of milk, 1 cup of yogurt, or 1 ½ ounces of cheese. · Eat 6 to 8 servings of grains each day.  A serving is 1 slice of bread, 1 ounce of dry cereal, or ½ cup of cooked rice, pasta, or cooked cereal. Try to choose whole-grain products as much as possible. · Limit lean meat, poultry, and fish to 2 servings each day. A serving is 3 ounces, about the size of a deck of cards. · Eat 4 to 5 servings of nuts, seeds, and legumes (cooked dried beans, lentils, and split peas) each week. A serving is 1/3 cup of nuts, 2 tablespoons of seeds, or ½ cup of cooked beans or peas. · Limit fats and oils to 2 to 3 servings each day. A serving is 1 teaspoon of vegetable oil or 2 tablespoons of salad dressing. · Limit sweets and added sugars to 5 servings or less a week. A serving is 1 tablespoon jelly or jam, ½ cup sorbet, or 1 cup of lemonade. · Eat less than 2,300 milligrams (mg) of sodium a day. If you limit your sodium to 1,500 mg a day, you can lower your blood pressure even more. · Be aware that all of these are the suggested number of servings for people who eat 1,800 to 2,000 calories a day. Your recommended number of servings may be different if you need more or fewer calories. Tips for success  · Start small. Do not try to make dramatic changes to your diet all at once. You might feel that you are missing out on your favorite foods and then be more likely to not follow the plan. Make small changes, and stick with them. Once those changes become habit, add a few more changes. · Try some of the following:  ? Make it a goal to eat a fruit or vegetable at every meal and at snacks. This will make it easy to get the recommended amount of fruits and vegetables each day. ? Try yogurt topped with fruit and nuts for a snack or healthy dessert. ? Add lettuce, tomato, cucumber, and onion to sandwiches. ? Combine a ready-made pizza crust with low-fat mozzarella cheese and lots of vegetable toppings. Try using tomatoes, squash, spinach, broccoli, carrots, cauliflower, and onions. ?  Have a variety of cut-up vegetables with a low-fat dip as an appetizer instead of chips and dip. ? Sprinkle sunflower seeds or chopped almonds over salads. Or try adding chopped walnuts or almonds to cooked vegetables. ? Try some vegetarian meals using beans and peas. Add garbanzo or kidney beans to salads. Make burritos and tacos with mashed rosa beans or black beans. Where can you learn more? Go to http://www.lanier.com/  Enter H967 in the search box to learn more about \"DASH Diet: Care Instructions. \"  Current as of: April 29, 2021               Content Version: 13.0  © 5049-5447 [employer]. Care instructions adapted under license by [employer] (which disclaims liability or warranty for this information). If you have questions about a medical condition or this instruction, always ask your healthcare professional. [place] [address] any warranty or liability for your use of this information. Patient Education        High Blood Pressure: Care Instructions  Overview     It's normal for blood pressure to go up and down throughout the day. But if it stays up, you have high blood pressure. Another name for high blood pressure is hypertension. Despite what a lot of people think, high blood pressure usually doesn't cause headaches or make you feel dizzy or lightheaded. It usually has no symptoms. But it does increase your risk of stroke, heart attack, and other problems. You and your doctor will talk about your risks of these problems based on your blood pressure. Your doctor will give you a goal for your blood pressure. Your goal will be based on your health and your age. Lifestyle changes, such as eating healthy and being active, are always important to help lower blood pressure. You might also take medicine to reach your blood pressure goal.  Follow-up care is a key part of your treatment and safety. Be sure to make and go to all appointments, and call your doctor if you are having problems. It's also a good idea to know your test results and keep a list of the medicines you take. How can you care for yourself at home? Medical treatment  · If you stop taking your medicine, your blood pressure will go back up. You may take one or more types of medicine to lower your blood pressure. Be safe with medicines. Take your medicine exactly as prescribed. Call your doctor if you think you are having a problem with your medicine. · Talk to your doctor before you start taking aspirin every day. Aspirin can help certain people lower their risk of a heart attack or stroke. But taking aspirin isn't right for everyone, because it can cause serious bleeding. · See your doctor regularly. You may need to see the doctor more often at first or until your blood pressure comes down. · If you are taking blood pressure medicine, talk to your doctor before you take decongestants or anti-inflammatory medicine, such as ibuprofen. Some of these medicines can raise blood pressure. · Learn how to check your blood pressure at home. Lifestyle changes  · Stay at a healthy weight. This is especially important if you put on weight around the waist. Losing even 10 pounds can help you lower your blood pressure. · If your doctor recommends it, get more exercise. Walking is a good choice. Bit by bit, increase the amount you walk every day. Try for at least 30 minutes on most days of the week. You also may want to swim, bike, or do other activities. · Avoid or limit alcohol. Talk to your doctor about whether you can drink any alcohol. · Try to limit how much sodium you eat to less than 2,300 milligrams (mg) a day. Your doctor may ask you to try to eat less than 1,500 mg a day. · Eat plenty of fruits (such as bananas and oranges), vegetables, legumes, whole grains, and low-fat dairy products. · Lower the amount of saturated fat in your diet. Saturated fat is found in animal products such as milk, cheese, and meat.  Limiting these foods may help you lose weight and also lower your risk for heart disease. · Do not smoke. Smoking increases your risk for heart attack and stroke. If you need help quitting, talk to your doctor about stop-smoking programs and medicines. These can increase your chances of quitting for good. When should you call for help? Call 911  anytime you think you may need emergency care. This may mean having symptoms that suggest that your blood pressure is causing a serious heart or blood vessel problem. Your blood pressure may be over 180/120. For example, call 911 if:    · You have symptoms of a heart attack. These may include:  ? Chest pain or pressure, or a strange feeling in the chest.  ? Sweating. ? Shortness of breath. ? Nausea or vomiting. ? Pain, pressure, or a strange feeling in the back, neck, jaw, or upper belly or in one or both shoulders or arms. ? Lightheadedness or sudden weakness. ? A fast or irregular heartbeat.     · You have symptoms of a stroke. These may include:  ? Sudden numbness, tingling, weakness, or loss of movement in your face, arm, or leg, especially on only one side of your body. ? Sudden vision changes. ? Sudden trouble speaking. ? Sudden confusion or trouble understanding simple statements. ? Sudden problems with walking or balance. ? A sudden, severe headache that is different from past headaches.     · You have severe back or belly pain. Do not wait until your blood pressure comes down on its own. Get help right away. Call your doctor now or seek immediate care if:    · Your blood pressure is much higher than normal (such as 180/120 or higher), but you don't have symptoms.     · You think high blood pressure is causing symptoms, such as:  ? Severe headache.  ? Blurry vision. Watch closely for changes in your health, and be sure to contact your doctor if:    · Your blood pressure measures higher than your doctor recommends at least 2 times.  That means the top number is higher or the bottom number is higher, or both.     · You think you may be having side effects from your blood pressure medicine. Where can you learn more? Go to http://www.gray.com/  Enter [id number] in the search box to learn more about \"High Blood Pressure: Care Instructions. \"  Current as of: April 29, 2021               Content Version: 13.0  © 7767-7988 [employer]. Care instructions adapted under license by [employer] (which disclaims liability or warranty for this information). If you have questions about a medical condition or this instruction, always ask your healthcare professional. [place] [address] any warranty or liability for your use of this information.

## 2021-10-30 LAB
BACTERIA SPEC CULT: NORMAL
SERVICE CMNT-IMP: NORMAL

## 2021-11-08 PROBLEM — B20 HIV (HUMAN IMMUNODEFICIENCY VIRUS INFECTION) (HCC): Status: ACTIVE | Noted: 2021-11-08

## 2021-11-08 PROBLEM — B19.10 HEPATITIS B: Status: ACTIVE | Noted: 2021-11-08

## 2021-11-08 PROBLEM — F20.9 SCHIZOPHRENIA (HCC): Status: ACTIVE | Noted: 2021-11-08

## 2021-11-08 PROBLEM — B18.2 CHRONIC HEPATITIS C WITHOUT HEPATIC COMA (HCC): Status: ACTIVE | Noted: 2021-11-08

## 2021-11-08 PROBLEM — F14.11 HISTORY OF COCAINE ABUSE (HCC): Status: ACTIVE | Noted: 2021-11-08

## 2021-11-29 NOTE — DISCHARGE SUMMARY
Discharge Summary    Patient: Natalia Martinez MRN: [id number]  CSN: [id number]    YOB: 1960  Age: 64 y.o. Sex: female    DOA: 10/24/2021 LOS:  LOS: 0 days   Discharge Date: 10/29/2021     Admission Diagnoses: Hypoxia [R09.02]    Discharge Diagnoses:    Acute respiratory failure with hypoxia and hypercapnea  ESRD  Volume overload/flash pulmonary edema  Lactic acidosis  Anemia of chronic disease  HIV  Schizophrenia  Hx Hep B and C  Hx poor compliance    Discharge Condition: Stable    PHYSICAL EXAM  Visit Vitals  BP (!) 131/90 (BP 1 Location: Left upper arm, BP Patient Position: At rest)   Pulse 86   Temp 98.6 °F (37 °C)   Resp 16   Ht 5' 1\" (1.549 m)   Wt 43.6 kg (96 lb 1.6 oz)   SpO2 97%   BMI 18.16 kg/m²       General: In NAD. Nontoxic-appearing. HEENT: NCAT. Sclerae anicteric, EOMI. OP clear. Lungs:  Clear, no wheezes. No accessory muscle use. Heart:  RRR. Abdomen: Soft, NT/ND. Extremities: Warm, no edema or ischemia. Psych:   Mood normal.  Neurologic:  Awake and alert, moves extremities spontaneously. Motor grossly nonfocal.    Hospital Course:   See admission H&P for full details of HPI. Patient was referred for hospital admission after presenting to the emergency department for evaluation of shortness of breath. She was noted to have pulmonary edema on chest imaging and required urgent dialysis for management of volume. Empiric antibiotic therapy was initiated but there is no evidence for any acute/active infection. Antibiotic therapy was discontinued and patient has not had any decompensation in overall status. There was initial concern that patient may not have appropriate disposition but patient apparently lives with her daughter who has agreed to take her and on discharge. Patient is medically stable for discharge home with outpatient follow-up as advised.       Consults:   Nephrology    Significant Diagnostic Studies/Procedures:  CXR:  IMPRESSION     Cardiogenic pulmonary edema      Discharge Medications:     Discharge Medication List as of 10/29/2021  1:47 PM      CONTINUE these medications which have NOT CHANGED    Details   darunavir ethanolate (Prezista) 800 mg tablet Take 800 mg by mouth daily (with breakfast). , Historical Med      ritonavir (NORVIR) 100 mg tablet Take 100 mg by mouth daily. , Historical Med      dolutegravir (Tivicay) 50 mg tab tablet Take 50 mg by mouth daily. , Historical Med      esomeprazole (NEXIUM) 40 mg capsule Take 40 mg by mouth daily. , Historical Med      gabapentin (NEURONTIN) 100 mg capsule Take 200 mg by mouth nightly., Historical Med      lamiVUDine (EPIVIR) 150 mg tablet Take 75 mg by mouth daily. , Historical Med      melatonin 3 mg tablet Take 6 mg by mouth nightly., Historical Med      mirtazapine (REMERON) 15 mg tablet Take 7.5 mg by mouth nightly., Historical Med      pyridoxine, vitamin B6, (VITAMIN B-6) 50 mg tablet Take 50 mg by mouth daily. , Historical Med      QUEtiapine (SEROquel) 25 mg tablet Take 25 mg by mouth nightly., Historical Med      senna (SENOKOT) 8.6 mg tablet Take 8.6 mg by mouth two (2) times a day., Historical Med      trimethoprim-sulfamethoxazole (BACTRIM, SEPTRA)  mg per tablet Take 1 Tablet by mouth every twelve (12) hours. , Historical Med      RISPERIDONE (RISPERDAL PO) Take  by mouth., Historical Med      tenofovir DISOPROXIL FUMARATE (VIREAD) 300 mg tablet Take 300 mg by mouth every seven (7) days. , Historical Med         STOP taking these medications       ritonavir (NORVIR) 80 mg/mL solution Comments:   Reason for Stopping:               Activity: As tolerated. Diet: Cardiac Diet and Renal Diet    Disposition: Home with home health care services. Follow-up: with PCP in 1 week. Trung Desai MD  [address] [place], [place] [address]ists    Disclaimer: Sections of this note are dictated using utilizing voice recognition software. Minor typographical errors may be present.  If questions arise, please do not hesitate to contact me or call our department.

## 2022-02-13 ENCOUNTER — APPOINTMENT (OUTPATIENT)
Dept: GENERAL RADIOLOGY | Age: 62
DRG: 470 | End: 2022-02-13
Attending: EMERGENCY MEDICINE
Payer: COMMERCIAL

## 2022-02-13 ENCOUNTER — HOSPITAL ENCOUNTER (INPATIENT)
Age: 62
LOS: 5 days | Discharge: HOME HEALTH | DRG: 470 | End: 2022-02-18
Attending: EMERGENCY MEDICINE | Admitting: INTERNAL MEDICINE
Payer: COMMERCIAL

## 2022-02-13 DIAGNOSIS — J96.01 ACUTE RESPIRATORY FAILURE WITH HYPOXIA AND HYPERCAPNIA (HCC): ICD-10-CM

## 2022-02-13 DIAGNOSIS — Z86.19 HISTORY OF HEPATITIS C: ICD-10-CM

## 2022-02-13 DIAGNOSIS — B20 HISTORY OF HIV INFECTION (HCC): ICD-10-CM

## 2022-02-13 DIAGNOSIS — E87.70 HYPERVOLEMIA, UNSPECIFIED HYPERVOLEMIA TYPE: Primary | ICD-10-CM

## 2022-02-13 DIAGNOSIS — J44.9 CHRONIC OBSTRUCTIVE PULMONARY DISEASE, UNSPECIFIED COPD TYPE (HCC): ICD-10-CM

## 2022-02-13 DIAGNOSIS — J96.02 ACUTE RESPIRATORY FAILURE WITH HYPOXIA AND HYPERCAPNIA (HCC): ICD-10-CM

## 2022-02-13 DIAGNOSIS — Z99.2 ESRD (END STAGE RENAL DISEASE) ON DIALYSIS (HCC): ICD-10-CM

## 2022-02-13 DIAGNOSIS — Z86.79 HISTORY OF CHF (CONGESTIVE HEART FAILURE): ICD-10-CM

## 2022-02-13 DIAGNOSIS — N18.6 ESRD (END STAGE RENAL DISEASE) ON DIALYSIS (HCC): ICD-10-CM

## 2022-02-13 DIAGNOSIS — B20 AIDS (ACQUIRED IMMUNODEFICIENCY SYNDROME), CD4 <=200 (HCC): ICD-10-CM

## 2022-02-13 DIAGNOSIS — Z72.0 TOBACCO USE: ICD-10-CM

## 2022-02-13 LAB
ALBUMIN SERPL-MCNC: 3.2 G/DL (ref 3.4–5)
ALBUMIN/GLOB SERPL: 0.7 {RATIO} (ref 0.8–1.7)
ALP SERPL-CCNC: 98 U/L (ref 45–117)
ALT SERPL-CCNC: 21 U/L (ref 13–56)
ANION GAP SERPL CALC-SCNC: 6 MMOL/L (ref 3–18)
AST SERPL-CCNC: 31 U/L (ref 10–38)
BASOPHILS # BLD: 0 K/UL (ref 0–0.1)
BASOPHILS NFR BLD: 0 % (ref 0–2)
BILIRUB SERPL-MCNC: 0.4 MG/DL (ref 0.2–1)
BNP SERPL-MCNC: ABNORMAL PG/ML (ref 0–900)
BUN SERPL-MCNC: 44 MG/DL (ref 7–18)
BUN/CREAT SERPL: 7 (ref 12–20)
CALCIUM SERPL-MCNC: 7.9 MG/DL (ref 8.5–10.1)
CHLORIDE SERPL-SCNC: 107 MMOL/L (ref 100–111)
CO2 SERPL-SCNC: 29 MMOL/L (ref 21–32)
CREAT SERPL-MCNC: 6.53 MG/DL (ref 0.6–1.3)
DIFFERENTIAL METHOD BLD: ABNORMAL
EOSINOPHIL # BLD: 0.1 K/UL (ref 0–0.4)
EOSINOPHIL NFR BLD: 3 % (ref 0–5)
ERYTHROCYTE [DISTWIDTH] IN BLOOD BY AUTOMATED COUNT: 14.5 % (ref 11.6–14.5)
GLOBULIN SER CALC-MCNC: 4.3 G/DL (ref 2–4)
GLUCOSE SERPL-MCNC: 127 MG/DL (ref 74–99)
HCT VFR BLD AUTO: 35.6 % (ref 35–45)
HGB BLD-MCNC: 10.5 G/DL (ref 12–16)
IMM GRANULOCYTES # BLD AUTO: 0 K/UL (ref 0–0.04)
IMM GRANULOCYTES NFR BLD AUTO: 1 % (ref 0–0.5)
LACTATE BLD-SCNC: 0.9 MMOL/L (ref 0.4–2)
LYMPHOCYTES # BLD: 1 K/UL (ref 0.9–3.6)
LYMPHOCYTES NFR BLD: 34 % (ref 21–52)
MCH RBC QN AUTO: 27.9 PG (ref 24–34)
MCHC RBC AUTO-ENTMCNC: 29.5 G/DL (ref 31–37)
MCV RBC AUTO: 94.7 FL (ref 78–100)
MONOCYTES # BLD: 0.3 K/UL (ref 0.05–1.2)
MONOCYTES NFR BLD: 11 % (ref 3–10)
NEUTS SEG # BLD: 1.5 K/UL (ref 1.8–8)
NEUTS SEG NFR BLD: 51 % (ref 40–73)
NRBC # BLD: 0 K/UL (ref 0–0.01)
NRBC BLD-RTO: 0 PER 100 WBC
PLATELET # BLD AUTO: 188 K/UL (ref 135–420)
PMV BLD AUTO: 9.7 FL (ref 9.2–11.8)
POTASSIUM SERPL-SCNC: 3.5 MMOL/L (ref 3.5–5.5)
PROT SERPL-MCNC: 7.5 G/DL (ref 6.4–8.2)
RBC # BLD AUTO: 3.76 M/UL (ref 4.2–5.3)
SODIUM SERPL-SCNC: 142 MMOL/L (ref 136–145)
TROPONIN-HIGH SENSITIVITY: 31 NG/L (ref 0–54)
WBC # BLD AUTO: 2.8 K/UL (ref 4.6–13.2)

## 2022-02-13 PROCEDURE — 65660000004 HC RM CVT STEPDOWN

## 2022-02-13 PROCEDURE — 85025 COMPLETE CBC W/AUTO DIFF WBC: CPT

## 2022-02-13 PROCEDURE — 80053 COMPREHEN METABOLIC PANEL: CPT

## 2022-02-13 PROCEDURE — 74011000250 HC RX REV CODE- 250: Performed by: EMERGENCY MEDICINE

## 2022-02-13 PROCEDURE — 71045 X-RAY EXAM CHEST 1 VIEW: CPT

## 2022-02-13 PROCEDURE — 84484 ASSAY OF TROPONIN QUANT: CPT

## 2022-02-13 PROCEDURE — 94660 CPAP INITIATION&MGMT: CPT

## 2022-02-13 PROCEDURE — 83880 ASSAY OF NATRIURETIC PEPTIDE: CPT

## 2022-02-13 PROCEDURE — 96374 THER/PROPH/DIAG INJ IV PUSH: CPT

## 2022-02-13 PROCEDURE — 99285 EMERGENCY DEPT VISIT HI MDM: CPT

## 2022-02-13 PROCEDURE — 83605 ASSAY OF LACTIC ACID: CPT

## 2022-02-13 PROCEDURE — 65660000001 HC RM ICU INTERMED STEPDOWN

## 2022-02-13 RX ORDER — NITROGLYCERIN 40 MG/100ML
0-20 INJECTION INTRAVENOUS
Status: DISCONTINUED | OUTPATIENT
Start: 2022-02-13 | End: 2022-02-14

## 2022-02-13 RX ORDER — SODIUM CHLORIDE 0.9 % (FLUSH) 0.9 %
5-40 SYRINGE (ML) INJECTION AS NEEDED
Status: DISCONTINUED | OUTPATIENT
Start: 2022-02-13 | End: 2022-02-18 | Stop reason: HOSPADM

## 2022-02-13 RX ORDER — SODIUM CHLORIDE 0.9 % (FLUSH) 0.9 %
5-40 SYRINGE (ML) INJECTION EVERY 8 HOURS
Status: DISCONTINUED | OUTPATIENT
Start: 2022-02-13 | End: 2022-02-18 | Stop reason: HOSPADM

## 2022-02-13 RX ADMIN — NITROGLYCERIN 10 MCG/MIN: 40 INJECTION INTRAVENOUS at 21:22

## 2022-02-13 NOTE — Clinical Note
Status[de-identified] INPATIENT [101]   Type of Bed: Stepdown [17]   Cardiac Monitoring Required?: No   Inpatient Hospitalization Certified Necessary for the Following Reasons: 3.  Patient receiving treatment that can only be provided in an inpatient setting (further clarification in H&P documentation)   Admitting Diagnosis: Volume overload [[id number]]   Admitting Physician: Jonah Bautista [[id number]]   Attending Physician: Jonah Bautista [[id number]]   Estimated Length of Stay: 3-4 Midnights   Discharge Plan[de-identified] Other (Specify)

## 2022-02-14 PROBLEM — N25.81 SECONDARY HYPERPARATHYROIDISM OF RENAL ORIGIN (HCC): Status: ACTIVE | Noted: 2022-02-14

## 2022-02-14 LAB
ATRIAL RATE: 95 BPM
B PERT DNA SPEC QL NAA+PROBE: NOT DETECTED
BORDETELLA PARAPERTUSSIS PCR, BORPAR: NOT DETECTED
C PNEUM DNA SPEC QL NAA+PROBE: NOT DETECTED
CALCULATED P AXIS, ECG09: 61 DEGREES
CALCULATED R AXIS, ECG10: 12 DEGREES
CALCULATED T AXIS, ECG11: 75 DEGREES
DIAGNOSIS, 93000: NORMAL
FLUAV SUBTYP SPEC NAA+PROBE: NOT DETECTED
FLUBV RNA SPEC QL NAA+PROBE: NOT DETECTED
GLUCOSE BLD STRIP.AUTO-MCNC: 62 MG/DL (ref 70–110)
GLUCOSE BLD STRIP.AUTO-MCNC: 70 MG/DL (ref 70–110)
GLUCOSE BLD STRIP.AUTO-MCNC: 88 MG/DL (ref 70–110)
HADV DNA SPEC QL NAA+PROBE: NOT DETECTED
HCOV 229E RNA SPEC QL NAA+PROBE: NOT DETECTED
HCOV HKU1 RNA SPEC QL NAA+PROBE: NOT DETECTED
HCOV NL63 RNA SPEC QL NAA+PROBE: NOT DETECTED
HCOV OC43 RNA SPEC QL NAA+PROBE: NOT DETECTED
HMPV RNA SPEC QL NAA+PROBE: NOT DETECTED
HPIV1 RNA SPEC QL NAA+PROBE: NOT DETECTED
HPIV2 RNA SPEC QL NAA+PROBE: NOT DETECTED
HPIV3 RNA SPEC QL NAA+PROBE: NOT DETECTED
HPIV4 RNA SPEC QL NAA+PROBE: NOT DETECTED
LDH SERPL L TO P-CCNC: 376 U/L (ref 81–234)
M PNEUMO DNA SPEC QL NAA+PROBE: NOT DETECTED
P-R INTERVAL, ECG05: 150 MS
Q-T INTERVAL, ECG07: 408 MS
QRS DURATION, ECG06: 70 MS
QTC CALCULATION (BEZET), ECG08: 512 MS
RPR SER QL: NONREACTIVE
RSV RNA SPEC QL NAA+PROBE: NOT DETECTED
RV+EV RNA SPEC QL NAA+PROBE: NOT DETECTED
SARS-COV-2 PCR, COVPCR: NOT DETECTED
VENTRICULAR RATE, ECG03: 95 BPM

## 2022-02-14 PROCEDURE — 74011250637 HC RX REV CODE- 250/637: Performed by: INTERNAL MEDICINE

## 2022-02-14 PROCEDURE — 0202U NFCT DS 22 TRGT SARS-COV-2: CPT

## 2022-02-14 PROCEDURE — 87327 CRYPTOCOCCUS NEOFORM AG IA: CPT

## 2022-02-14 PROCEDURE — 65660000004 HC RM CVT STEPDOWN

## 2022-02-14 PROCEDURE — 87517 HEPATITIS B DNA QUANT: CPT

## 2022-02-14 PROCEDURE — 65660000001 HC RM ICU INTERMED STEPDOWN

## 2022-02-14 PROCEDURE — 83615 LACTATE (LD) (LDH) ENZYME: CPT

## 2022-02-14 PROCEDURE — 93005 ELECTROCARDIOGRAM TRACING: CPT

## 2022-02-14 PROCEDURE — 74011250636 HC RX REV CODE- 250/636

## 2022-02-14 PROCEDURE — 74011250636 HC RX REV CODE- 250/636: Performed by: INTERNAL MEDICINE

## 2022-02-14 PROCEDURE — 2709999900 HC NON-CHARGEABLE SUPPLY

## 2022-02-14 PROCEDURE — 86592 SYPHILIS TEST NON-TREP QUAL: CPT

## 2022-02-14 PROCEDURE — 74011000250 HC RX REV CODE- 250: Performed by: INTERNAL MEDICINE

## 2022-02-14 PROCEDURE — 94660 CPAP INITIATION&MGMT: CPT

## 2022-02-14 PROCEDURE — 86706 HEP B SURFACE ANTIBODY: CPT

## 2022-02-14 PROCEDURE — 87522 HEPATITIS C REVRS TRNSCRPJ: CPT

## 2022-02-14 PROCEDURE — 74011000250 HC RX REV CODE- 250: Performed by: STUDENT IN AN ORGANIZED HEALTH CARE EDUCATION/TRAINING PROGRAM

## 2022-02-14 PROCEDURE — 74011000258 HC RX REV CODE- 258: Performed by: INTERNAL MEDICINE

## 2022-02-14 PROCEDURE — 94762 N-INVAS EAR/PLS OXIMTRY CONT: CPT

## 2022-02-14 PROCEDURE — 87340 HEPATITIS B SURFACE AG IA: CPT

## 2022-02-14 PROCEDURE — 87536 HIV-1 QUANT&REVRSE TRNSCRPJ: CPT

## 2022-02-14 PROCEDURE — 86361 T CELL ABSOLUTE COUNT: CPT

## 2022-02-14 PROCEDURE — 90935 HEMODIALYSIS ONE EVALUATION: CPT

## 2022-02-14 PROCEDURE — 87449 NOS EACH ORGANISM AG IA: CPT

## 2022-02-14 PROCEDURE — 82962 GLUCOSE BLOOD TEST: CPT

## 2022-02-14 RX ORDER — HEPARIN SODIUM 5000 [USP'U]/ML
5000 INJECTION, SOLUTION INTRAVENOUS; SUBCUTANEOUS EVERY 8 HOURS
Status: DISCONTINUED | OUTPATIENT
Start: 2022-02-14 | End: 2022-02-18 | Stop reason: HOSPADM

## 2022-02-14 RX ORDER — MORPHINE SULFATE 2 MG/ML
2 INJECTION, SOLUTION INTRAMUSCULAR; INTRAVENOUS
Status: COMPLETED | OUTPATIENT
Start: 2022-02-14 | End: 2022-02-14

## 2022-02-14 RX ORDER — PROMETHAZINE HYDROCHLORIDE 12.5 MG/1
12.5 TABLET ORAL
Status: DISCONTINUED | OUTPATIENT
Start: 2022-02-14 | End: 2022-02-18 | Stop reason: HOSPADM

## 2022-02-14 RX ORDER — DOXERCALCIFEROL 4 UG/2ML
1 INJECTION INTRAVENOUS
Status: DISCONTINUED | OUTPATIENT
Start: 2022-02-14 | End: 2022-02-17

## 2022-02-14 RX ORDER — SODIUM CHLORIDE 0.9 % (FLUSH) 0.9 %
5-40 SYRINGE (ML) INJECTION AS NEEDED
Status: DISCONTINUED | OUTPATIENT
Start: 2022-02-14 | End: 2022-02-18 | Stop reason: HOSPADM

## 2022-02-14 RX ORDER — ATOVAQUONE 750 MG/5ML
750 SUSPENSION ORAL
Status: DISCONTINUED | OUTPATIENT
Start: 2022-02-15 | End: 2022-02-18 | Stop reason: HOSPADM

## 2022-02-14 RX ORDER — AMLODIPINE BESYLATE 10 MG/1
10 TABLET ORAL DAILY
Status: DISCONTINUED | OUTPATIENT
Start: 2022-02-15 | End: 2022-02-18 | Stop reason: HOSPADM

## 2022-02-14 RX ORDER — AZITHROMYCIN 600 MG/1
1200 TABLET, FILM COATED ORAL
Status: DISCONTINUED | OUTPATIENT
Start: 2022-02-14 | End: 2022-02-18 | Stop reason: HOSPADM

## 2022-02-14 RX ORDER — NITROGLYCERIN 40 MG/100ML
20 INJECTION INTRAVENOUS CONTINUOUS
Status: DISCONTINUED | OUTPATIENT
Start: 2022-02-14 | End: 2022-02-16

## 2022-02-14 RX ORDER — LOSARTAN POTASSIUM 50 MG/1
50 TABLET ORAL DAILY
Status: DISCONTINUED | OUTPATIENT
Start: 2022-02-15 | End: 2022-02-15

## 2022-02-14 RX ORDER — ACETAMINOPHEN 325 MG/1
650 TABLET ORAL
Status: DISCONTINUED | OUTPATIENT
Start: 2022-02-14 | End: 2022-02-18 | Stop reason: HOSPADM

## 2022-02-14 RX ORDER — POLYETHYLENE GLYCOL 3350 17 G/17G
17 POWDER, FOR SOLUTION ORAL DAILY PRN
Status: DISCONTINUED | OUTPATIENT
Start: 2022-02-14 | End: 2022-02-18 | Stop reason: HOSPADM

## 2022-02-14 RX ORDER — ONDANSETRON 2 MG/ML
4 INJECTION INTRAMUSCULAR; INTRAVENOUS
Status: DISCONTINUED | OUTPATIENT
Start: 2022-02-14 | End: 2022-02-18 | Stop reason: HOSPADM

## 2022-02-14 RX ORDER — NITROGLYCERIN 40 MG/100ML
25 INJECTION INTRAVENOUS
Status: DISCONTINUED | OUTPATIENT
Start: 2022-02-14 | End: 2022-02-14

## 2022-02-14 RX ORDER — MORPHINE SULFATE 2 MG/ML
INJECTION, SOLUTION INTRAMUSCULAR; INTRAVENOUS
Status: COMPLETED
Start: 2022-02-14 | End: 2022-02-14

## 2022-02-14 RX ORDER — ACETAMINOPHEN 650 MG/1
650 SUPPOSITORY RECTAL
Status: DISCONTINUED | OUTPATIENT
Start: 2022-02-14 | End: 2022-02-18 | Stop reason: HOSPADM

## 2022-02-14 RX ORDER — SODIUM CHLORIDE 0.9 % (FLUSH) 0.9 %
5-40 SYRINGE (ML) INJECTION EVERY 8 HOURS
Status: DISCONTINUED | OUTPATIENT
Start: 2022-02-14 | End: 2022-02-18 | Stop reason: HOSPADM

## 2022-02-14 RX ADMIN — SODIUM CHLORIDE, PRESERVATIVE FREE 10 ML: 5 INJECTION INTRAVENOUS at 16:25

## 2022-02-14 RX ADMIN — SODIUM CHLORIDE, PRESERVATIVE FREE 10 ML: 5 INJECTION INTRAVENOUS at 01:36

## 2022-02-14 RX ADMIN — ACETAMINOPHEN 650 MG: 325 TABLET ORAL at 12:57

## 2022-02-14 RX ADMIN — AZITHROMYCIN MONOHYDRATE 1200 MG: 600 TABLET ORAL at 18:17

## 2022-02-14 RX ADMIN — FUROSEMIDE 120 MG: 10 INJECTION, SOLUTION INTRAMUSCULAR; INTRAVENOUS at 01:42

## 2022-02-14 RX ADMIN — DOXERCALCIFEROL 1 MCG: 4 INJECTION, SOLUTION INTRAVENOUS at 16:16

## 2022-02-14 RX ADMIN — MORPHINE SULFATE 2 MG: 2 INJECTION, SOLUTION INTRAMUSCULAR; INTRAVENOUS at 02:45

## 2022-02-14 RX ADMIN — SODIUM CHLORIDE, PRESERVATIVE FREE 10 ML: 5 INJECTION INTRAVENOUS at 22:49

## 2022-02-14 RX ADMIN — HEPARIN SODIUM 5000 UNITS: 5000 INJECTION INTRAVENOUS; SUBCUTANEOUS at 22:48

## 2022-02-14 RX ADMIN — ACETAMINOPHEN 650 MG: 325 TABLET ORAL at 22:48

## 2022-02-14 RX ADMIN — SODIUM CHLORIDE, PRESERVATIVE FREE 10 ML: 5 INJECTION INTRAVENOUS at 16:24

## 2022-02-14 RX ADMIN — HEPARIN SODIUM 5000 UNITS: 5000 INJECTION INTRAVENOUS; SUBCUTANEOUS at 16:24

## 2022-02-14 NOTE — DIALYSIS
ACUTE HEMODIALYSIS TREATMENT    HEMODIALYSIS ORDERS: Physician: Dr. Hollis Arm: Saman   Duration: 3 hr   BFR: 350   DFR: 600   Dialysate:  Temp 36-37*C   K+  3    Ca+ 2.5   Na 138   Bicarb 35   Wt Readings from Last 1 Encounters:   02/14/22 54.4 kg (120 lb)    Patient Chart [x]   Unable to Obtain []  Dry weight/UF Goal: 2000 ml    Heparin []  Bolus    Units    [] Hourly    Units    [x]None       Pre BP:   162/94   Pulse:  77   Respirations: 18   Temp:  98.5  [] Oral [] Axillary [] Esophageal   Labs: []  Pre  []  Post:   [x] N/A   Additional Orders(medications, blood products, hypotension management): [] Yes   [] No     [x]  Jasmyne Consent Verified     CATHETER ACCESS:  []N/A   [x]Right   []Left   []IJ   []Fem  [x]Chest wall  []TransHepatic   [] First use X-ray verified     [x]Tunnel    [] Non Tunneled   [x]No S/S infection  []Redness  []Drainage []Cultured []Swelling []Pain   [x]Medical Aseptic Prep Utilized   []Dressing Changed  [x] Biopatch  Date:    []Clotted   [x]Patent   Flows: [x]Good  []Poor  []Reversed   If access problem,  notified: []Yes    [x]N/A          GENERAL ASSESSMENT:    LUNGS:  Resp Rate 18   [x] Clear  [] Coarse  [] Crackles  [] Wheezing  [] Diminished         Respirations:  [x]Easy  []Labored  []N/A  Cough:  []Productive  []Dry  [x]N/A      Therapy:  []RA  O2 Type:  [x]NC  Mask: []  NRB    [] BiPaP  Flow: 3 l/min                   [] Ventilated  [] Intubated  [] Trach     CARDIAC: [] Regular      [] Irregular   [] Rhythm:          [x] Monitored   [x] Bedside   [x] Remotely monitored     EDEMA: [] None   [x]Generalized  [] Pitting [] 1+   [] 2 +   [] 3+    [] 4+  [] Pedal    SKIN:   [x] Warm  [] Hot     [] Cold   [x] Dry     [] Pale   [] Diaphoretic                  [] Flushed  [] Jaundiced  [] Cyanotic     LOC:    [x] Alert      [x]Oriented:    [x] Person     [x] Place  []Time               [x] Confused  [] Lethargic  [] Medicated  [] Non-responsive  [] Non Verbal   GI / ABDOMEN:                     [] Flat    [] Distended    [x] Soft    [] Firm   []  Obese                   [] Diarrhea   [] FMS [x] Bowel Sounds  [] Nausea  [] Vomiting                   [] NGT  [] OGT    [] PEG  [] Tube Feedings @     / URINE ASSESSMENT:                   [] Voiding  []  Aragon  [x] Oliguria  [] Anuria                     [] Incontinent  []  Incontinent Brief   []  PureWick     PAIN:  [x] 0 []1  []2   []3   []4   []5   []6   []7   []8   []9   []10                MOBILITY:  [x] Bed    [] Stretcher      All Vitals and Treatment Details on [address]: [facility]          Room # 2307/01    [x] Routine         [] 1st Time Acute/Chronic   [] Urgent      [] Stat            [] Acute Room   [x]  Bedside    [] ICU/CCU     [] ER   Isolation Precautions:  [x] Dialysis    There are currently no Active Isolations     ALLERGIES:     Allergies   Allergen Reactions    Ceftriaxone Itching     Rxn in ER recorded 2007      Aspirin Hives    Pcn [Penicillins] Hives      Code Status:  Full Code     Hepatitis Status      Lab Results   Component Value Date/Time    Hepatitis B surface Ag 0.27 10/24/2021 08:00 PM    Hepatitis B surface Ab 20.72 10/24/2021 08:00 PM    Hep B Core Ab, total Positive (A) 10/24/2021 08:00 PM        Current Labs:      Lab Results   Component Value Date/Time    WBC 2.8 (L) 02/13/2022 09:15 PM    HGB 10.5 (L) 02/13/2022 09:15 PM    HCT 35.6 02/13/2022 09:15 PM    PLATELET 147 23/21/2538 09:15 PM    MCV 94.7 02/13/2022 09:15 PM     Lab Results   Component Value Date/Time    Sodium 142 02/13/2022 09:15 PM    Potassium 3.5 02/13/2022 09:15 PM    Chloride 107 02/13/2022 09:15 PM    CO2 29 02/13/2022 09:15 PM    Anion gap 6 02/13/2022 09:15 PM    Glucose 127 (H) 02/13/2022 09:15 PM    BUN 44 (H) 02/13/2022 09:15 PM    Creatinine 6.53 (H) 02/13/2022 09:15 PM    BUN/Creatinine ratio 7 (L) 02/13/2022 09:15 PM    GFR est AA 8 (L) 02/13/2022 09:15 PM    GFR est non-AA 7 (L) 02/13/2022 09:15 PM    Calcium 7.9 (L) 02/13/2022 09:15 PM          DIET:  DIET ADULT     PRIMARY NURSE REPORT:   Pre Dialysis: Yolanda Dorantes RN    Time: 1400      EDUCATION:    [x] Patient           Knowledge Basis: [x]None []Minimal [] Substantial [] Unknown  Barriers to learning  []N/A  [] Intubated/Trached/Ventilated  [] Sedated/Paralyzed   [] Access Care     [] S&S of infection  [] Fluid Management  [] K+   [x] Procedural    [] Medications   [] Tx Options   [] Transplant   [] Diet      Teaching Tools:  [x] Explain  [] Demo  [] Handouts [] Video  Patient response: [] Verbalized understanding   [x] Requires follow up        [x] Time Out/Safety Check    [x] Extracorporeal Circuit Tested for integrity       RO/HEMODIALYSIS MACHINE SAFETY CHECKS  Before each treatment:        [address] [place] [address]                                     [] Unit Machine #  with centralized RO                                    [] Portable Machine #1/RO serial # [id number]                                  [x] Portable Machine #2/RO serial # [id number]                                  [] Portable Machine #4/RO serial # [id number]                                  [] Portable Machine #10/RO serial #  [id number]                                                                                                         Alarm Test:  Pass time 1410            [x] RO/Machine Log Complete    Machine Temp    36-37*C             Dialysate: pH 7.4    Conductivity: Meter 14.1   HD Machine  14     TCD: 13.9  Dialyzer Lot # [id number]     Blood Tubing Lot # [id number]     Saline Lot # [id number]     CHLORINE TESTING-Before each treatment and every 4 hours    Total Chlorine: [x] less than 0.1 ppm  Initial Time Check: 1400       4 Hr/2nd Check Time:    (if greater than 0.1 ppm from Primary then every 30 minutes from Secondary)     TREATMENT INITIATION  with Dialysis Precautions:   [x] All Connections Secured              [x] Saline Line Double Clamped   [x] Venous Parameters Set [x] Arterial Parameters Set    [x] Prime Given 250ml NSS              [x]Air Foam Detector Engaged      Treatment Initiation Note:  Received  Patient in her room awake alert to self. Blood pressure stable nitroglycerin gtt infusint assessed, stable for treatment. Access with no signs or symptoms of complication. Treatment initiated      During Treatment Notes:  5010  Vascular access visible with arterial and venous line connections intact. Pt resting comfortably. 1530  Vascular access visible with arterial and venous line connections intact. Pt resting comfortably. 1545  Vascular access visible with arterial and venous line connections intact. Pt resting comfortably. 1600  Vascular access visible with arterial and venous line connections intact. Pt resting comfortably. 1630  Vascular access visible with arterial and venous line connections intact. Pt resting comfortably. 1645  Vascular access visible with arterial and venous line connections intact. Pt resting comfortably. 1700  Vascular access visible with arterial and venous line connections intact. Pt resting comfortably. 1715  Vascular access visible with arterial and venous line connections intact. Pt resting comfortably. 1730  Vascular access visible with arterial and venous line connections intact. Pt resting comfortably. 1740 Dialysis treatment complete.        Medication Dose Volume Route Time Jasmyne Nurse, Title      HD  Irasema Fountain, RN      HD  Irasema Fountain, RN      HD  Irasema Fountain, RN     Post Assessment  Dialyzer Cleared:   [x] Good  [] Fair  [] Poor  Blood processed:  52 L  UF Removed:  1800 Ml    Post /89   Pulse  88 Resp  26  Temp 98 Lungs: [x] Clear [] Course  [] Crackles                 []  Wheezing   [] Diminished   Post Tx Vascular Access: [x] N/A Cardiac :[] Regular   [] Irregular   Rhythm:  [x] Monitored   [] Not Monitored    CVC Catheter: [] N/A  Locking solution: Heparin 1:1000 U  Arterial port 1.9 ml   Venous port 2.0 ml Edema:  [] None  [] Generalized                     Skin:[x] Warm  [x] Dry [] Diaphoretic               [] Flushed  [] Pale [] Cyanotic Pain:  [x]0  []1 []2  []3 []4  []5  []6  []7 []8    []9  []10     Post Treatment Note:    Treatment terminated 20 min early due to patient c/o leg cramps Dr. Marcie Coon notified ok to stop treatment. 1800 ml of fluid removed. Patient remains in her room in  Stable condition.       POST TREATMENT PRIMARY NURSE HANDOFF REPORT:   Post Dialysis: Michael Cabrera RN               Time:  [phone]       Abbreviations: AVG-arterial venous graft, AVF-arterial venous fistula, IJ-Internal Jugular, Subcl-Subclavian, Fem-Femoral, Tx-treatment, AP/HR-apical heart rate, VSS- Vital Signs Stable, CVC- Central Venous Catheter, DFR-dialysate flow rate, BFR-blood flow rate, AP-arterial pressure, -venous pressure, UF-ultrafiltrate, TMP-transmembrane pressure, Vic-Venous, Art-Arterial, RO-Reverse Osmosis

## 2022-02-14 NOTE — PROGRESS NOTES
1059-TRANSFER - IN REPORT:    Verbal report received from Adali Duran RN(name) on Maixme Hall  being received from ED(unit) for routine progression of care      Report consisted of patients Situation, Background, Assessment and   Recommendations(SBAR). Information from the following report(s) SBAR and MAR was reviewed with the receiving nurse. Opportunity for questions and clarification was provided. Assessment completed upon patients arrival to unit and care assumed. 1130-pt arrive on unit. Pt on 4L nasal cannula. 1154-informed by Aide pt blood sugar is 62. Pt given 4 oz of orange juice. Will repeat in 15 minutes. 1210-repeat blood glucose 70. Gave pt 4 oz of orange juice. Will repeat in 15 minutes. 1240-repeat blood glucose 88.

## 2022-02-14 NOTE — ED NOTES
Pt has bed ready, however RN needs to discuss drip order with MD to ensure order are appropriate for floor. MD paged. Waiting for call back. Respiratory also paged.

## 2022-02-14 NOTE — ED PROVIDER NOTES
EMERGENCY DEPARTMENT HISTORY AND PHYSICAL EXAM  This was created with voice recognition software and transcription errors may be present. 9:03 PM  Date: 2/13/2022  Patient Name: Amaryllis Koyanagi    History of Presenting Illness     Chief Complaint:    History Provided By:     HPI: Amaryllis Koyanagi is a 63 y.o. female Past medical history of AIDS hep B hep C incisional disease pulmonary TB latent syphilis schizoaffective disorder substance abuse presents with shortness of breath. Patient's been having worsening shortness of breath for the past few days. Worse when she lays down. EMS found her sats in the high 70s. Blood pressure 240. Patient is a dialysis patient reportedly had a full run yesterday. No chest pain no nausea vomiting fever no chills    PCP: Sid Jamison MD      Past History     Past Medical History:  Past Medical History:   Diagnosis Date    [facility]     AIDS (acquired immune deficiency syndrome) ([facility]     Anemia     Esophageal candidiasis ([facility]     ESRD (end stage renal disease) ([facility]     HCV (hepatitis C virus)     Hepatitis B     Hepatitis C     HIV (human immunodeficiency virus infection) ([facility]     HIV (human immunodeficiency virus infection) ([facility]     Late latent syphilis     Pulmonary TB     Schizoaffective disorder ([facility]     Substance abuse ([facility]        Past Surgical History:  Past Surgical History:   Procedure Laterality Date    HX GYN      HX NEPHRECTOMY      left kidney removed at age 17   Kavya Emery NEPHRECTOMY         Family History:  No family history on file. Social History:  Social History     Tobacco Use    Smoking status: Not on file    Smokeless tobacco: Not on file   Substance Use Topics    Alcohol use: Not on file    Drug use: Not on file       Allergies:   Allergies   Allergen Reactions    Aspirin Hives    Pcn [Penicillins] Hives       Review of Systems     Review of Systems   Unable to perform ROS: Acuity of condition     10 point review of systems otherwise negative unless noted in HPI. Physical Exam       Physical Exam  Constitutional:       Appearance: She is well-developed. HENT:      Head: Normocephalic and atraumatic. Eyes:      Pupils: Pupils are equal, round, and reactive to light. Cardiovascular:      Rate and Rhythm: Normal rate and regular rhythm. Heart sounds: Normal heart sounds. No murmur heard. No friction rub. Pulmonary:      Effort: Respiratory distress present. Comments: Diffuse Rales  Abdominal:      General: There is no distension. Palpations: Abdomen is soft. Tenderness: There is no abdominal tenderness. There is no guarding or rebound. Musculoskeletal:         General: Normal range of motion. Cervical back: Normal range of motion and neck supple. Skin:     General: Skin is warm and dry. Neurological:      Mental Status: She is alert and oriented to person, place, and time. Psychiatric:         Behavior: Behavior normal.         Thought Content: Thought content normal.         Diagnostic Study Results     Vital Signs  EKG:  Labs:   Imaging:     Medical Decision Making     ED Course: Progress Notes, Reevaluation, and Consults:    I will be the provider of record for this patient. Provider Notes (Medical Decision Making): Patient presents with acute shortness of breath likely fluid overload elevated blood pressure will start nitro drip CHF and reassess         Diagnosis     Clinical Impression: No diagnosis found. Disposition:        Patient's Medications   Start Taking    No medications on file   Continue Taking    DARUNAVIR ETHANOLATE (PREZISTA) 800 MG TABLET    Take 800 mg by mouth daily (with breakfast). DOLUTEGRAVIR (TIVICAY) 50 MG TAB TABLET    Take 50 mg by mouth daily. ESOMEPRAZOLE (NEXIUM) 40 MG CAPSULE    Take 40 mg by mouth daily. GABAPENTIN (NEURONTIN) 100 MG CAPSULE    Take 200 mg by mouth nightly.     LAMIVUDINE (EPIVIR) 150 MG TABLET    Take 75 mg by mouth daily. MELATONIN 3 MG TABLET    Take 6 mg by mouth nightly. MIRTAZAPINE (REMERON) 15 MG TABLET    Take 7.5 mg by mouth nightly. PYRIDOXINE, VITAMIN B6, (VITAMIN B-6) 50 MG TABLET    Take 50 mg by mouth daily. QUETIAPINE (SEROQUEL) 25 MG TABLET    Take 25 mg by mouth nightly. RISPERIDONE (RISPERDAL PO)    Take  by mouth. RITONAVIR (NORVIR) 100 MG TABLET    Take 100 mg by mouth daily. SENNA (SENOKOT) 8.6 MG TABLET    Take 8.6 mg by mouth two (2) times a day. TENOFOVIR DISOPROXIL FUMARATE (VIREAD) 300 MG TABLET    Take 300 mg by mouth every seven (7) days. TRIMETHOPRIM-SULFAMETHOXAZOLE (BACTRIM, SEPTRA)  MG PER TABLET    Take 1 Tablet by mouth every twelve (12) hours.    These Medications have changed    No medications on file   Stop Taking    No medications on file

## 2022-02-14 NOTE — PROGRESS NOTES
Problem: Falls - Risk of  Goal: *Absence of Falls  Description: Document Kianna Araujo Fall Risk and appropriate interventions in the flowsheet.   Outcome: Progressing Towards Goal  Note: Fall Risk Interventions:  Mobility Interventions: Patient to call before getting OOB,Bed/chair exit alarm    Mentation Interventions: Bed/chair exit alarm,Toileting rounds,Door open when patient unattended         Elimination Interventions: Patient to call for help with toileting needs,Call light in reach              Problem: Patient Education: Go to Patient Education Activity  Goal: Patient/Family Education  Outcome: Progressing Towards Goal     Problem: Impaired Skin Integrity/Pressure Injury Treatment  Goal: *Improvement of Existing Pressure Injury  Outcome: Progressing Towards Goal     Problem: Patient Education: Go to Patient Education Activity  Goal: Patient/Family Education  Outcome: Progressing Towards Goal     Problem: Pain  Goal: *Control of Pain  Outcome: Progressing Towards Goal     Problem: Patient Education: Go to Patient Education Activity  Goal: Patient/Family Education  Outcome: Progressing Towards Goal

## 2022-02-14 NOTE — ED NOTES
Report called. RT to room, pt taken off BIPAP and placed on 4L NC. Tolerating well. Pt placed on portable monitor and taken transported by ED RN and RT. Pt remained stable and alert.

## 2022-02-14 NOTE — ED NOTES
RN to room to reassess pt. Reports she is feeling dizzy. BP read 65/45. Nitro stopped. Physician paged.

## 2022-02-14 NOTE — CONSULTS
[facility]  (A Division of [address] [place] [address])      Consultation Note      Date of Admission: 2/13/2022    Date of Note: 2/14/2022      Reason for Referral: Sepsis, HIV  Referring Physician: Dr. Dylan Sosa, Dr. Areli Duncan from this admission:   2/14 respiratory viral panel: Negative for all pathogens tested    Current Antimicrobials:    Prior Antimicrobials:          Assessment:         Acute hypoxic respiratory failure: Initially requiring BiPAP. Most likely related to volume overload and hypertensive emergency chest x-ray with pulmonary edema. Cannot entirely rule out underlying PJP pneumonia  HIV/AIDS: On tenofovir, Tivicay, Prezista/ritonavir, 3TC per documentation of prior to admission meds.; based on review of viral loads over the last 2 years it appears as though she become less compliant with her antiretroviral regimen. She is not sure who follows her locally for HIV but had moved from [facility] about 1 year ago. Previously on Biktarvy (bictegravir-emtricitibine-TNF)   -1/20/2022 HIV viral load 108,000, absolute CD4 16   -9/28/2020 genotype reviewed-no predicted integrates resistance  Chronic active hepatitis B: Last HBV PCR was 526 on 1/20/2022; hepatitis B E antigen negative on 3/29/2021  End-stage renal disease on hemodialysis  History of syphilis: Last known RPR titer 1.8 about 1 years ago  Reported history of latent TB- quantiferron gold from 9/28/2020 negative  Hx substance abuse:  Prior documentation of cocaine use  Plan:   Continue antiretrovirals renally dosed-encourage patient compliance. Repeat CD4. HIV viral load pending.   CBC, CMP in am  Check serum cryptococcal antigen, CMV PCR  Sputum for routine and fungal cultures as well as silver stain to rule out PJP  Add atovaquone in setting of end-stage renal disease and need for PGP prophylaxis  Add azithromycin for MAC prophylaxis given last known CD4 16    Can schedule as OP in my clinic to follow for HIV management    DO [facility]  [address], [address] [facility], [address]  Office: [phone]  Mobile/Text: [phone]    Lines / Catheters:  Peripheral    HPI:  Ms. Graciela Morley is a pleasant 53-year-old female with a past medical history of end-stage renal disease on dialysis, schizophrenia, prior hep B as well as hep C, prior treatment for latent TB, and HIV is initially presenting to [facility] yesterday evening with complaints of increasing shortness of breath. Upon arrival it was noted that she was tripoding and had sats of 74% upon presentation with associated hypertension with blood pressures in the 554T systolic range. She tells me that prior to this she had been feeling a little weak and only mildly short of breath. She was able to complete her dialysis the day before. She was started on BiPAP noted improved oxygenation. Chest x-ray at the time and revealed interstitial edema. Respiratory viral panel was negative for all pathogens including Covid. She is remained afebrile with BP is now improved with systolics ranging from the 130s to 160s. She has been transitioned from BiPAP earlier today to nasal cannula at 4 L. Plans for dialysis noted as per Dr. Lacey Toure. Of note she was discharged in November 2021 for a similar episode of acute respiratory failure with hypoxia and hypercapnia. She is a somewhat difficult historian and not able to clearly recall events leading up to her admission nor her prior admissions.          Past Medical History:   Diagnosis Date    [facility]     AIDS (acquired immune deficiency syndrome) [facility]     Anemia     Esophageal candidiasis (HCC)     ESRD (end stage renal disease) (HCC)     HCV (hepatitis C virus)     Hepatitis B     Hepatitis C     HIV (human immunodeficiency virus infection) [facility]     HIV (human immunodeficiency virus infection) ([facility]     Late latent syphilis     Pulmonary TB     [facility]     Substance abuse ([facility]      Past Surgical History:   Procedure Laterality Date    HX GYN      HX NEPHRECTOMY      left kidney removed at age 17    HX NEPHRECTOMY       No family history on file.   Medications reviewed as below:   Current Facility-Administered Medications   Medication Dose Route Frequency Provider Last Rate Last Admin    sodium chloride (NS) flush 5-40 mL  5-40 mL IntraVENous Q8H Rosa Gowers, DO        sodium chloride (NS) flush 5-40 mL  5-40 mL IntraVENous PRN Rosa Gowers, DO        acetaminophen (TYLENOL) tablet 650 mg  650 mg Oral Q6H PRN Rosa Gowers, DO   650 mg at 02/14/22 1257    Or    acetaminophen (TYLENOL) suppository 650 mg  650 mg Rectal Q6H PRN Rosa Gowers, DO        polyethylene glycol (MIRALAX) packet 17 g  17 g Oral DAILY PRN Rosa Gowers, DO        heparin (porcine) injection 5,000 Units  5,000 Units SubCUTAneous Q8H Rosa Gowers, DO        promethazine (PHENERGAN) tablet 12.5 mg  12.5 mg Oral Q6H PRN Rosa Gowers, DO        Or    ondansetron [facility]) injection 4 mg  4 mg IntraVENous Q6H PRN Rosa Gowers, DO        nitroglycerin (TRIDIL) 400 mcg/ml infusion  25 mcg/min IntraVENous CONTINUOUS May Castillo DO 3.8 mL/hr at 02/14/22 1042 25 mcg/min at 02/14/22 1042    doxercalciferoL (HECTOROL) 4 mcg/2 mL injection 1 mcg  1 mcg IntraVENous DIALYSIS MAGDY CONNOR & Abida Weeks MD        sodium chloride (NS) flush 5-40 mL  5-40 mL IntraVENous Q8H Rosa Gowers, DO   10 mL at 02/14/22 0136    sodium chloride (NS) flush 5-40 mL  5-40 mL IntraVENous PRN Rosa Gowers, DO         Allergies   Allergen Reactions    Ceftriaxone Itching     Rxn in ER recorded 2007      Aspirin Hives    Pcn [Penicillins] Hives     Social History     Socioeconomic History    Marital status: LEGALLY      Spouse name: Not on file    Number of children: Not on file    Years of education: Not on file    Highest education level: Not on file   Occupational History    Not on file   Tobacco Use    Smoking status: Not on file    Smokeless tobacco: Not on file   Substance and Sexual Activity    Alcohol use: Not on file    Drug use: Not on file    Sexual activity: Not on file   Other Topics Concern    Not on file   Social History Narrative    Not on file     Social Determinants of Health     Financial Resource Strain:     Difficulty of Paying Living Expenses: Not on file   Food Insecurity:     Worried About Running Out of Food in the Last Year: Not on file    Alphonse of Food in the Last Year: Not on file   Transportation Needs:     Lack of Transportation (Medical): Not on file    Lack of Transportation (Non-Medical): Not on file   Physical Activity:     Days of Exercise per Week: Not on file    Minutes of Exercise per Session: Not on file   Stress:     Feeling of Stress : Not on file   Social Connections:     Frequency of Communication with Friends and Family: Not on file    Frequency of Social Gatherings with Friends and Family: Not on file    Attends Sikh Services: Not on file    Active Member of [address] [place] [address] or Organizations: Not on file    Attends Club or Organization Meetings: Not on file    Marital Status: Not on file   Intimate Partner Violence:     Fear of Current or Ex-Partner: Not on file    Emotionally Abused: Not on file    Physically Abused: Not on file    Sexually Abused: Not on file   Housing Stability:     Unable to Pay for Housing in the Last Year: Not on file    Number of [place] in the Last Year: Not on file    Unstable Housing in the Last Year: Not on file        Review of Systems    Negative Unless BOLDED    General: fevers, chills, myalgias, arthralgias, unexplained weight loss, malaise, fatigue.   HEENT:  headaches,sinus pain or presure, recent URI, recent dental procedures;  tinnitus, hearing loss , visual changes, catarats, dizziness or blurred vision  PUlMONARY:  cough , shortness of breath, sputum production, hx of asthma or COPD. previous treatement for TB or PPD. Cardiovascular: chest pain, previous CAD/MI, vavlular heart disease,  murmurs  GI:   nausea, vomiting, diarrhea, abdominal pain, prior C.diff  :  urinary frequency, dysuria, hematuria, bladder incontinence. Neurologic:  seizures, syncope or prior CVA/TIA, confusion, memory impairment, neuropathy  Musculoskeletal:  myalgias arthralgias, joint pain/ swelling,  back pain  Skin:  Purities,  recurrent cellulitis,  chronic stasis ulcer, diabetic foot ulcers  Endocrine: polyuria, polydipsia, hair loss, weight gain  Psych: Denies depression or treatment by a psychiatrist/psycologist  Heme-Onc: prior DVT, easy bruising, fatigue, malignancy        Objective:        Visit Vitals  BP (!) 159/103 (BP 1 Location: Right upper arm, BP Patient Position: At rest)   Pulse 94   Temp 98.4 °F (36.9 °C)   Resp 20   Ht 5' 1\" (1.549 m)   Wt 54.4 kg (120 lb)   SpO2 100%   BMI 22.67 kg/m²     Temp (24hrs), Av.5 °F (36.9 °C), Min:98.4 °F (36.9 °C), Max:98.5 °F (36.9 °C)        General:   awake alert and oriented   Skin:   no rashes or skin lesions noted on limited exam   HEENT:  Normocephalic, atraumatic, PERRL, EOMI, no scleral icterus or pallor; no conjunctival hemmohage;  nasal and oral mucous are moist and without evidence of lesions. No thrush. Dentition good. Neck supple, no bruits. Lymph Nodes:   no cervical, axillary or inguinal adenopathy   Lungs: Tachypneic, bilateral rales, no wheezes   Heart:  RRR, s1 and s2; no murmurs rubs or gallops, no edema, + pedal pulses   Abdomen:  soft, non-distended, active bowel sounds, no hepatomegaly, no splenomegaly. non-tender   Genitourinary:  deferred   Extremities:   no clubbing, cyanosis; no joint effusions or swelling;  Full ROM of all large joints to the upper and lower extremities; muscle mass appropriate for age   Neurologic:  No gross focal sensory abnormalities; 5/5 muscle strength to upper and lower extremities. Speech appropirate. Cranial nerves intact   Psychiatric:   appropriate and interactive although a somewhat quirky affect. Labs: Results:   Chemistry Recent Labs     02/13/22 2115   *      K 3.5      CO2 29   BUN 44*   CREA 6.53*   CA 7.9*   AGAP 6   BUCR 7*   AP 98   TP 7.5   ALB 3.2*   GLOB 4.3*   AGRAT 0.7*      CBC w/Diff Recent Labs     02/13/22 2115   WBC 2.8*   RBC 3.76*   HGB 10.5*   HCT 35.6      GRANS 51   LYMPH 34   EOS 3            No results found for: SDES Lab Results   Component Value Date/Time    Culture result: (A) 10/24/2021 08:20 PM     STAPHYLOCOCCUS SPECIES, COAGULASE NEGATIVE GROWING IN THE AEROBIC BOTTLE NO SITE INDICATED    Culture result: NO GROWTH 6 DAYS 10/24/2021 08:00 PM        Results     Procedure Component Value Units Date/Time    RESPIRATORY VIRUS PANEL W/COVID-19, PCR [[id number]] Collected: 02/14/22 0127    Order Status: Completed Specimen: Nasopharyngeal Updated: 02/14/22 0248     Adenovirus Not detected        Coronavirus 229E Not detected        Coronavirus HKU1 Not detected        Coronavirus CVNL63 Not detected        Coronavirus OC43 Not detected        SARS-CoV-2, PCR Not detected        Metapneumovirus Not detected        Rhinovirus and Enterovirus Not detected        Influenza A Not detected        Influenza B Not detected        Parainfluenza 1 Not detected        Parainfluenza 2 Not detected        Parainfluenza 3 Not detected        Parainfluenza virus 4 Not detected        RSV by PCR Not detected        B. parapertussis, PCR Not detected        Bordetella pertussis - PCR Not detected        Chlamydophila pneumoniae DNA, QL, PCR Not detected        Mycoplasma pneumoniae DNA, QL, PCR Not detected               Imaging:      All imaging reviewed from Admission to present as per radiology interpretation in [address] [place] [address]

## 2022-02-14 NOTE — H&P
Hospitalist Admission History and Physical    NAME:  Elkin Mtz   :   1960   MRN:   [id number]     PCP:  Woody Wetzel MD  Date/Time:  2022 1:43 AM  Subjective:   CHIEF COMPLAINT:      HISTORY OF PRESENT ILLNESS:     Miac Orellana is a 63 y.o.  female with a history of HIV hepatitis B hepatitis C latent syphilis pulmonary tuberculosis and end-stage renal disease who presents with shortness of breath for the past few days she reports worse when she is lying down EMS found patient to be satting in the 70s with blood pressure of 200/100. Patient unsure but previously reported dialysis was completed yesterday she reports no chest pain nausea vomiting fever chills. Patient undergoes dialysis . EMS applied 1 inch of Nitropaste and to use sublingual nitroglycerin they also initiated BiPAP. Patient reports she has been out of all of her medications and is currently not taking any prescribed medicines. Patient admitted in October with similar issues however not nearly as hypertensive had much worse pulmonary edema during October admission.         Past Medical History:   Diagnosis Date    [facility]     AIDS (acquired immune deficiency syndrome) ([facility]     Anemia     Esophageal candidiasis (HCC)     ESRD (end stage renal disease) (HCC)     HCV (hepatitis C virus)     Hepatitis B     Hepatitis C     HIV (human immunodeficiency virus infection) ([facility]     HIV (human immunodeficiency virus infection) ([facility]     Late latent syphilis     Pulmonary TB     Schizoaffective disorder ([facility]     Substance abuse ([facility]         Past Surgical History:   Procedure Laterality Date    HX GYN      HX NEPHRECTOMY      left kidney removed at age 17   [de-identified] NEPHRECTOMY         Social History     Tobacco Use    Smoking status: Not on file    Smokeless tobacco: Not on file   Substance Use Topics    Alcohol use: Not on file        No family history on file. Allergies   Allergen Reactions    Ceftriaxone Itching     Rxn in ER recorded 2007      Aspirin Hives    Pcn [Penicillins] Hives        Prior to Admission Medications   Prescriptions Last Dose Informant Patient Reported? Taking? QUEtiapine (SEROquel) 25 mg tablet Not Taking at Unknown time  Yes No   Sig: Take 25 mg by mouth nightly. Patient not taking: Reported on 2/14/2022   RISPERIDONE (RISPERDAL PO) Not Taking at Unknown time  Yes No   Sig: Take  by mouth. Patient not taking: Reported on 2/14/2022   darunavir ethanolate (Prezista) 800 mg tablet Not Taking at Unknown time  Yes No   Sig: Take 800 mg by mouth daily (with breakfast). Patient not taking: Reported on 2/14/2022   dolutegravir (Tivicay) 50 mg tab tablet Not Taking at Unknown time  Yes No   Sig: Take 50 mg by mouth daily. Patient not taking: Reported on 2/14/2022   esomeprazole (NEXIUM) 40 mg capsule Not Taking at Unknown time  Yes No   Sig: Take 40 mg by mouth daily. Patient not taking: Reported on 2/14/2022   gabapentin (NEURONTIN) 100 mg capsule Not Taking at Unknown time  Yes No   Sig: Take 200 mg by mouth nightly. Patient not taking: Reported on 2/14/2022   lamiVUDine (EPIVIR) 150 mg tablet Not Taking at Unknown time  Yes No   Sig: Take 75 mg by mouth daily. Patient not taking: Reported on 2/14/2022   melatonin 3 mg tablet Not Taking at Unknown time  Yes No   Sig: Take 6 mg by mouth nightly. Patient not taking: Reported on 2/14/2022   mirtazapine (REMERON) 15 mg tablet Not Taking at Unknown time  Yes No   Sig: Take 7.5 mg by mouth nightly. Patient not taking: Reported on 2/14/2022   pyridoxine, vitamin B6, (VITAMIN B-6) 50 mg tablet Not Taking at Unknown time  Yes No   Sig: Take 50 mg by mouth daily. Patient not taking: Reported on 2/14/2022   ritonavir (NORVIR) 100 mg tablet Not Taking at Unknown time  Yes No   Sig: Take 100 mg by mouth daily.    Patient not taking: Reported on 2/14/2022   senna (SENOKOT) 8.6 mg tablet Not Taking at Unknown time  Yes No   Sig: Take 8.6 mg by mouth two (2) times a day. Patient not taking: Reported on 2022   tenofovir DISOPROXIL FUMARATE (VIREAD) 300 mg tablet Not Taking at Unknown time  Yes No   Sig: Take 300 mg by mouth every seven (7) days. Patient not taking: Reported on 2022   trimethoprim-sulfamethoxazole (BACTRIM, SEPTRA)  mg per tablet Not Taking at Unknown time  Yes No   Sig: Take 1 Tablet by mouth every twelve (12) hours. Patient not taking: Reported on 2022      Facility-Administered Medications: None       REVIEW OF SYSTEMS:     [] Unable to obtain  ROS due to  []mental status change  []sedated   []intubated   [x]Total of 12 systems reviewed as follows:  Constitutional:  negative fever, negative chills, negative weight loss  Eyes:   negative visual changes  ENT:   negative sore throat, tongue or lip swelling  Respiratory:  negative cough,  + dyspnea  Cards:   negative for chest pain, palpitations, lower extremity edema  GI:   negative for nausea, vomiting, diarrhea, and supra pubic pain  Genitourinary: negative for frequency, dysuria  Integument:  negative for rash and pruritus  Hematologic:  negative for easy bruising and gum/nose bleeding  Musculoskel: negative for myalgias,  back pain and muscle weakness  Neurological:  negative for headaches, dizziness, vertigo  Behavl/Psych:  negative for feelings of anxiety, depression         Objective:   VITALS:    Visit Vitals  BP (!) 166/104   Pulse 94   Temp (P) 98.5 °F (36.9 °C)   Resp 28   Ht 5' 1\" (1.549 m)   Wt 54.4 kg (120 lb)   SpO2 100%   BMI 22.67 kg/m²     Temp (24hrs), Av.5 °F (36.9 °C), Min:98.5 °F (36.9 °C), Max:98.5 °F (36.9 °C)      PHYSICAL EXAM:   General:    Alert, cooperative, sitting comfortably on BiPAP with no acute respiratory distress or accessory muscle use appears older than stated age. Head:   Normocephalic, without obvious abnormality, atraumatic.   Eyes:   Conjunctivae clear, anicteric sclerae. Pupils are equal  Neck:  Supple, symmetrical,  no adenopathy, thyroid: non tender    no carotid bruit and no JVD. Back:    Symmetric,  No CVA tenderness. Lungs:   Faint crackles at bases bilaterally with globally decreased breath sounds  Chest wall:  No tenderness or deformity. No Accessory muscle use. Heart:   Regular rate and rhythm,  no murmur, rub or gallop. Abdomen:   Soft, tenderness to suprapubic palpation. Not distended. Bowel sounds normal. No masses  Extremities: Extremities normal, atraumatic, No cyanosis. No edema peripherally appreciated. No clubbing  Skin:     Texture, turgor normal.  Multiple hyper and hypopigmented skin lesions noted across arms and legs not Jaundiced  Lymph nodes: Cervical, supraclavicular normal.  Psych:  Good insight. Not depressed. Not anxious or agitated. Neurologic: EOMs intact. No facial asymmetry. No aphasia or slurred speech. Normal  strength, Alert and oriented X 3. LAB DATA REVIEWED:    No components found for: [place]  Recent Labs     02/13/22 2115      K 3.5      CO2 29   BUN 44*   CREA 6.53*   *   CA 7.9*   ALB 3.2*   WBC 2.8*   HGB 10.5*   HCT 35.6            IMAGING RESULTS:   []  I have personally reviewed the actual   [x]CXR  []CT scan    CXR:   CXR Results  (Last 48 hours)               02/13/22 2113  XR CHEST PORT Final result    Impression:      Mild interstitial edema       Narrative:  EXAM: PORTABLE CHEST 2112 hours       CLINICAL HISTORY/INDICATION: sob worsening over several days, exacerbated by   laying down, decreased oxygen saturation, hypertensive, on dialysis with for   chronic dialysis one day ago, history of AIDS,           COMPARISON: Chest x-ray 10/24/2021, 3/3/2021. TECHNIQUE: Single AP view       FINDINGS:        Cardiac silhouette is top normal in size to mildly enlarged. Mildly increased   interstitial markings seen bilaterally. Pulmonary vessels are cephalized and   ill-defined. The costophrenic angles are sharp. Double-lumen right jugular   approach central catheter terminates at the distal superior vena cava. .                   Assessment/Plan:      Active Problems:    Volume overload (10/25/2021)      Respiratory failure ([address] (10/25/2021)    #Hypoxic respiratory failure secondary to fluid overload end-stage renal disease and hypertensive emergency  -EMS found patient with blood pressure 200/100 hypoxic into the 70s  -Goal 25% reduction in the first hour with slow titration of hypertension medications over next 24 to 48 hours  -Continue nitroglycerin drip for blood pressure control  -Continue BiPAP for fluid overload and hypoxemia  -No evidence of acute myocardial ischemia, troponin 31, EKG reviewed with no acute ischemic changes  -BNP 34,000 down from 50,000 on last admission  -Appears to be on no antihypertensive medications previously as an outpatient based on medicine review  -Patient reports ability to still make urine will attempt IV diuresis with 120 mg of IV Lasix x1  -Echo from [address] [place] [address] September 2021-EF 61% normal left ventricular diastolic function unremarkable valvular  -September 2021 stress test [facility] was negative  -We will need nephrology consult in a.m.     # Infectious disease HIV, hep B, hep C, TB, latent syphilis  -Patient currently not under the care of infectious disease does not know her medications and reports she has been out of meds for some time  -Recommend inpatient infectious disease consult  -We will get hepatitis and HIV labs, cd4  -Patient does not appear to be on HIV prophylaxis we will also need to be addressed    #Unspecified psychiatric disease, listed as schizoaffective  - Currently off all medications  -Will need to be addressed inpatient       ___________________________________________________  PLAN:    Risk of deterioration:  []Low    [x]Moderate  []High      Prophylaxis:  []Lovenox  []Coumadin  [x]Hep SQ  []SCDs []H2B/PPI    Disposition:  []Home w/ Family   [x]HH PT,OT,RN   []SNF/LTC   []SAH/Rehab    Discussed Code Status:    [x]Full Code      []DNR     ___________________________________________________    Care Plan discussed with:    [x]Patient   []Family    []ED Care Manager  []ED Doc   []Specialist :    Total Time Coordinating Admission: 50     minutes    []Total Critical Care Time:     ___________________________________________________  Admitting Physician: Noemi Cotto DO

## 2022-02-14 NOTE — ED TRIAGE NOTES
Patient presents to the ED via EMS from home for evaluation of increasing SOB. Per medic, \"She is a dialysis (T/Th/Sat) patient. She went yesterday and got her full dialysis. Today she has had increasing shortness of breath. When we arrived she was tripoding, rales in all lung fields and 74% on room air. She was hypertensive, 200s/100s. We gave a total of 2 sublingual nitro and applied one inch of nitro paste. We were unable to establish IV access. We also placed her on cipap. \"

## 2022-02-14 NOTE — PROGRESS NOTES
Physician Progress Note      Amauri Phillips  [facility] #:                  [id number]  :                       1960  ADMIT DATE:       2022 8:59 PM  DISCH DATE:  RESPONDING  PROVIDER #:        Hope Mark DO          QUERY TEXT:    Dear  hospitalist  Pt admitted with HTN  emergency  and has Hypoxic respiratory failure  documented. If possible, please document in progress notes and discharge summary further specificity regarding the type and acuity of respiratory failure: The medical record reflects the following:  Risk Factors: ESRD  ON  HD;   HTN  Clinical Indicators: per  H&P \" Hypoxic respiratory failure secondary to fluid overload end-stage renal disease and hypertensive emergency\"  Treatment: BIPAP;   nitro gtt    Thank you,  [email or web address]  Options provided:  -- Acute respiratory failure with hypoxia  -- Acute on chronic respiratory failure with hypoxia  -- Other - I will add my own diagnosis  -- Disagree - Not applicable / Not valid  -- Disagree - Clinically unable to determine / Unknown  -- Refer to Clinical Documentation Reviewer    PROVIDER RESPONSE TEXT:    This patient is in acute on chronic respiratory failure with hypoxia.     Query created by: Juan Ramon Wright on 2022 12:28 PM      Electronically signed by:  Hope Mark DO 2022 3:48 PM

## 2022-02-14 NOTE — PROGRESS NOTES
NIV    Stopped by to discuss patient awareness of the need for NIV for acute SOB/WOB . Patient seem to understand and her need to call for early placement .  Patient currently getting dialysis

## 2022-02-14 NOTE — PROGRESS NOTES
ESRD patient gets Dialysis at [facility] with Hypertensive Urgency,Pulmonary edema, on Nitro Drip,will arrange dialysis some time today in he room.  Orders given to HD nurse,

## 2022-02-14 NOTE — PROGRESS NOTES
completed the initial Spiritual Assessment of the patient in bed 20 of the emergency room and offered Pastoral Care support to the patient. Found patient lying in the bed while breathing thru a C-pap machine and trying to talk. Patient spoke very softly and hearing her was difficult. There is no advance dirrctive for this patient. Patient does not have any Latter day/cultural needs that will affect patients preferences in health care. Chaplains will continue to follow and will provide pastoral care on an as needed/requested basis.     [facility]  [phone]

## 2022-02-14 NOTE — ED NOTES
Low BP inccorect reading. Pts BP stable. However did discuss drip with MD Da Navarro. Drip running at 50mcg/min. Order for max of 20. Directed RN to titrate down.      Drip restarted at 40mcg/min

## 2022-02-15 ENCOUNTER — APPOINTMENT (OUTPATIENT)
Dept: VASCULAR SURGERY | Age: 62
DRG: 470 | End: 2022-02-15
Attending: INTERNAL MEDICINE
Payer: COMMERCIAL

## 2022-02-15 ENCOUNTER — APPOINTMENT (OUTPATIENT)
Dept: VASCULAR SURGERY | Age: 62
DRG: 470 | End: 2022-02-15
Attending: STUDENT IN AN ORGANIZED HEALTH CARE EDUCATION/TRAINING PROGRAM
Payer: COMMERCIAL

## 2022-02-15 LAB
ANION GAP SERPL CALC-SCNC: 7 MMOL/L (ref 3–18)
BASOPHILS # BLD AUTO: 0 X10E3/UL (ref 0–0.2)
BASOPHILS # BLD: 0 K/UL (ref 0–0.1)
BASOPHILS NFR BLD AUTO: 1 %
BASOPHILS NFR BLD: 1 % (ref 0–2)
BUN SERPL-MCNC: 26 MG/DL (ref 7–18)
BUN/CREAT SERPL: 5 (ref 12–20)
CALCIUM SERPL-MCNC: 7.9 MG/DL (ref 8.5–10.1)
CALCIUM SERPL-MCNC: 8.1 MG/DL (ref 8.5–10.1)
CD3+CD4+ CELLS # BLD: 14 /UL (ref 359–1519)
CD3+CD4+ CELLS NFR BLD: 2.7 % (ref 30.8–58.5)
CHLORIDE SERPL-SCNC: 100 MMOL/L (ref 100–111)
CO2 SERPL-SCNC: 29 MMOL/L (ref 21–32)
CREAT SERPL-MCNC: 5.28 MG/DL (ref 0.6–1.3)
CRYPTOC AG SER QL IA: NEGATIVE
DIFFERENTIAL METHOD BLD: ABNORMAL
EOSINOPHIL # BLD AUTO: 0.1 X10E3/UL (ref 0–0.4)
EOSINOPHIL # BLD: 0.1 K/UL (ref 0–0.4)
EOSINOPHIL NFR BLD AUTO: 4 %
EOSINOPHIL NFR BLD: 5 % (ref 0–5)
ERYTHROCYTE [DISTWIDTH] IN BLOOD BY AUTOMATED COUNT: 14 % (ref 11.7–15.4)
ERYTHROCYTE [DISTWIDTH] IN BLOOD BY AUTOMATED COUNT: 14.1 % (ref 11.6–14.5)
GLUCOSE SERPL-MCNC: 71 MG/DL (ref 74–99)
HBV SURFACE AB SER QL IA: POSITIVE
HBV SURFACE AB SERPL IA-ACNC: 115.39 MIU/ML
HBV SURFACE AG SER QL: 0.15 INDEX
HBV SURFACE AG SER QL: NEGATIVE
HCT VFR BLD AUTO: 25.2 % (ref 34–46.6)
HCT VFR BLD AUTO: 30.9 % (ref 35–45)
HEP BS AB COMMENT,HBSAC: NORMAL
HGB BLD-MCNC: 8.3 G/DL (ref 11.1–15.9)
HGB BLD-MCNC: 9.3 G/DL (ref 12–16)
IMM GRANULOCYTES # BLD AUTO: 0 K/UL (ref 0–0.04)
IMM GRANULOCYTES # BLD: 0.1 X10E3/UL (ref 0–0.1)
IMM GRANULOCYTES NFR BLD AUTO: 1 % (ref 0–0.5)
IMM GRANULOCYTES NFR BLD: 4 %
LEFT ARM GRAFT VOLUME FLOW: 2139 ML/MIN
LEFT ARTERIAL PROX ANASTOMOSIS AVF EDV: 392.9 CM/S
LEFT ARTERIAL PROX ANASTOMOSIS AVF PSV: 551.8 CM/S
LEFT AVF AVG DIST ANAST VOL FLOW: 1750 ML/MIN
LEFT AVF AVG DIST OUTFLOW VOL FLOW: 1066 ML/MIN
LEFT AVF AVG GRAFT NAME: NORMAL
LEFT AVF AVG INFLOW VOL FLOW: 125.7 ML/MIN
LEFT AVF AVG MID OUTFLOW VOL FLOW: 959.2 ML/MIN
LEFT AVF AVG OUTFLOW VESSEL NAME: NORMAL
LEFT AVF AVG PROX ANAST VOL FLOW: 621.9 ML/MIN
LEFT AVF AVG PROX OUTFLOW VOL FLOW: 928.6 ML/MIN
LEFT DIST OUTFLOW AVF EDV: 93.1 CM/S
LEFT DIST OUTFLOW AVF PSV: 141.6 CM/S
LEFT INFLOW ARTERY AVF EDV: 14.7 CM/S
LEFT INFLOW ARTERY AVF PSV: 36.2 CM/S
LEFT MID OUTFLOW AVF EDV: 123.8 CM/S
LEFT MID OUTFLOW AVF PSV: 170.7 CM/S
LEFT OUTFLOW VESSEL AVF EDV: 267.6 CM/S
LEFT OUTFLOW VESSEL AVF PSV: 314.2 CM/S
LEFT PROX OUTFLOW AVF EDV: 84.8 CM/S
LEFT PROX OUTFLOW AVF PSV: 112.3 CM/S
LEFT VENOUS DIST ANASTOMOSIS AVF EDV: 200.2 CM/S
LEFT VENOUS DIST ANASTOMOSIS AVF PSV: 314.2 CM/S
LYMPHOCYTES # BLD AUTO: 0.5 X10E3/UL (ref 0.7–3.1)
LYMPHOCYTES # BLD: 0.5 K/UL (ref 0.9–3.6)
LYMPHOCYTES NFR BLD AUTO: 22 %
LYMPHOCYTES NFR BLD: 25 % (ref 21–52)
MAGNESIUM SERPL-MCNC: 1.8 MG/DL (ref 1.6–2.6)
MCH RBC QN AUTO: 28 PG (ref 24–34)
MCH RBC QN AUTO: 28.9 PG (ref 26.6–33)
MCHC RBC AUTO-ENTMCNC: 30.1 G/DL (ref 31–37)
MCHC RBC AUTO-ENTMCNC: 32.9 G/DL (ref 31.5–35.7)
MCV RBC AUTO: 88 FL (ref 79–97)
MCV RBC AUTO: 93.1 FL (ref 78–100)
MONOCYTES # BLD AUTO: 0.2 X10E3/UL (ref 0.1–0.9)
MONOCYTES # BLD: 0.3 K/UL (ref 0.05–1.2)
MONOCYTES NFR BLD AUTO: 7 %
MONOCYTES NFR BLD: 14 % (ref 3–10)
NEUTROPHILS # BLD AUTO: 1.4 X10E3/UL (ref 1.4–7)
NEUTROPHILS NFR BLD AUTO: 62 %
NEUTS SEG # BLD: 1.2 K/UL (ref 1.8–8)
NEUTS SEG NFR BLD: 54 % (ref 40–73)
NRBC # BLD: 0 K/UL (ref 0–0.01)
NRBC BLD-RTO: 0 PER 100 WBC
PHOSPHATE SERPL-MCNC: 5.1 MG/DL (ref 2.5–4.9)
PLATELET # BLD AUTO: 161 X10E3/UL (ref 150–450)
PLATELET # BLD AUTO: 166 K/UL (ref 135–420)
PMV BLD AUTO: 10.8 FL (ref 9.2–11.8)
POTASSIUM SERPL-SCNC: 4.3 MMOL/L (ref 3.5–5.5)
PTH-INTACT SERPL-MCNC: 1146.5 PG/ML (ref 18.4–88)
RBC # BLD AUTO: 2.87 X10E6/UL (ref 3.77–5.28)
RBC # BLD AUTO: 3.32 M/UL (ref 4.2–5.3)
SODIUM SERPL-SCNC: 136 MMOL/L (ref 136–145)
WBC # BLD AUTO: 2.2 K/UL (ref 4.6–13.2)
WBC # BLD AUTO: 2.3 X10E3/UL (ref 3.4–10.8)

## 2022-02-15 PROCEDURE — 74011250637 HC RX REV CODE- 250/637: Performed by: INTERNAL MEDICINE

## 2022-02-15 PROCEDURE — 36415 COLL VENOUS BLD VENIPUNCTURE: CPT

## 2022-02-15 PROCEDURE — 85025 COMPLETE CBC W/AUTO DIFF WBC: CPT

## 2022-02-15 PROCEDURE — 65660000001 HC RM ICU INTERMED STEPDOWN

## 2022-02-15 PROCEDURE — 5A1D70Z PERFORMANCE OF URINARY FILTRATION, INTERMITTENT, LESS THAN 6 HOURS PER DAY: ICD-10-PCS | Performed by: STUDENT IN AN ORGANIZED HEALTH CARE EDUCATION/TRAINING PROGRAM

## 2022-02-15 PROCEDURE — 97161 PT EVAL LOW COMPLEX 20 MIN: CPT

## 2022-02-15 PROCEDURE — 84100 ASSAY OF PHOSPHORUS: CPT

## 2022-02-15 PROCEDURE — 83970 ASSAY OF PARATHORMONE: CPT

## 2022-02-15 PROCEDURE — 80048 BASIC METABOLIC PNL TOTAL CA: CPT

## 2022-02-15 PROCEDURE — 74011000250 HC RX REV CODE- 250: Performed by: INTERNAL MEDICINE

## 2022-02-15 PROCEDURE — 94762 N-INVAS EAR/PLS OXIMTRY CONT: CPT

## 2022-02-15 PROCEDURE — 93990 DOPPLER FLOW TESTING: CPT

## 2022-02-15 PROCEDURE — 2709999900 HC NON-CHARGEABLE SUPPLY

## 2022-02-15 PROCEDURE — 94660 CPAP INITIATION&MGMT: CPT

## 2022-02-15 PROCEDURE — 65660000004 HC RM CVT STEPDOWN

## 2022-02-15 PROCEDURE — 77010033711 HC HIGH FLOW OXYGEN

## 2022-02-15 PROCEDURE — 83735 ASSAY OF MAGNESIUM: CPT

## 2022-02-15 PROCEDURE — 74011250636 HC RX REV CODE- 250/636: Performed by: INTERNAL MEDICINE

## 2022-02-15 PROCEDURE — 77010033678 HC OXYGEN DAILY

## 2022-02-15 PROCEDURE — 97165 OT EVAL LOW COMPLEX 30 MIN: CPT

## 2022-02-15 PROCEDURE — 99233 SBSQ HOSP IP/OBS HIGH 50: CPT | Performed by: STUDENT IN AN ORGANIZED HEALTH CARE EDUCATION/TRAINING PROGRAM

## 2022-02-15 PROCEDURE — 97535 SELF CARE MNGMENT TRAINING: CPT

## 2022-02-15 RX ORDER — LOSARTAN POTASSIUM 50 MG/1
100 TABLET ORAL DAILY
Status: DISCONTINUED | OUTPATIENT
Start: 2022-02-16 | End: 2022-02-18 | Stop reason: HOSPADM

## 2022-02-15 RX ADMIN — AMLODIPINE BESYLATE 10 MG: 10 TABLET ORAL at 08:55

## 2022-02-15 RX ADMIN — SODIUM CHLORIDE, PRESERVATIVE FREE 10 ML: 5 INJECTION INTRAVENOUS at 15:02

## 2022-02-15 RX ADMIN — SODIUM CHLORIDE, PRESERVATIVE FREE 10 ML: 5 INJECTION INTRAVENOUS at 23:47

## 2022-02-15 RX ADMIN — LOSARTAN POTASSIUM 50 MG: 50 TABLET, FILM COATED ORAL at 08:55

## 2022-02-15 RX ADMIN — HEPARIN SODIUM 5000 UNITS: 5000 INJECTION INTRAVENOUS; SUBCUTANEOUS at 15:01

## 2022-02-15 RX ADMIN — SODIUM CHLORIDE, PRESERVATIVE FREE 10 ML: 5 INJECTION INTRAVENOUS at 15:01

## 2022-02-15 RX ADMIN — ACETAMINOPHEN 650 MG: 325 TABLET ORAL at 20:32

## 2022-02-15 RX ADMIN — SODIUM CHLORIDE, PRESERVATIVE FREE 10 ML: 5 INJECTION INTRAVENOUS at 08:56

## 2022-02-15 RX ADMIN — ACETAMINOPHEN 650 MG: 325 TABLET ORAL at 08:55

## 2022-02-15 RX ADMIN — HEPARIN SODIUM 5000 UNITS: 5000 INJECTION INTRAVENOUS; SUBCUTANEOUS at 06:34

## 2022-02-15 RX ADMIN — HEPARIN SODIUM 5000 UNITS: 5000 INJECTION INTRAVENOUS; SUBCUTANEOUS at 23:45

## 2022-02-15 RX ADMIN — ATOVAQUONE 750 MG: 750 SUSPENSION ORAL at 08:55

## 2022-02-15 NOTE — PROGRESS NOTES
Progress Note    Everton Horn  63 y.o. Admit Date: 2/13/2022  Active Problems:    ESRD (end stage renal disease) on dialysis ([facility] (10/25/2021) POA: Yes      Volume overload (10/25/2021) POA: Yes      Hypertension (10/25/2021) POA: Yes      Respiratory failure ([facility] (10/25/2021) POA: Yes      Secondary hyperparathyroidism of renal origin ([facility] (2/14/2022) POA: Unknown            Subjective:     Patient is having on going pain/discomfort on her left arm AVG area, has catheter, Arranged dialysis in her room due to on Nitrodrip. Scheduled for Doppler study of  Left arm AVF/AVG,not done. Getting cramps towards the end of dialysis      A comprehensive review of systems was negative except for that written in the History of Present Illness.     Objective:     Visit Vitals  BP (!) 143/81   Pulse (!) 104   Temp 98.5 °F (36.9 °C)   Resp 23   Ht 5' 1\" (1.549 m)   Wt 54.4 kg (120 lb)   SpO2 99%   BMI 22.67 kg/m²         Intake/Output Summary (Last 24 hours) at 2/14/2022 1939  Last data filed at 2/14/2022 1625  Gross per 24 hour   Intake 156.62 ml   Output    Net 156.62 ml       Current Facility-Administered Medications   Medication Dose Route Frequency Provider Last Rate Last Admin    sodium chloride (NS) flush 5-40 mL  5-40 mL IntraVENous Q8H Eugene Alicia, DO   10 mL at 02/14/22 1624    sodium chloride (NS) flush 5-40 mL  5-40 mL IntraVENous PRN Eugene Alicia, DO        acetaminophen (TYLENOL) tablet 650 mg  650 mg Oral Q6H PRN Eugene Alicia, DO   650 mg at 02/14/22 1257    Or    acetaminophen (TYLENOL) suppository 650 mg  650 mg Rectal Q6H PRN Eugene Alicia, DO        polyethylene glycol (MIRALAX) packet 17 g  17 g Oral DAILY PRN Eugene Alicia, DO        heparin (porcine) injection 5,000 Units  5,000 Units SubCUTAneous Q8H Eugene Alicia, DO   5,000 Units at 02/14/22 1624    promethazine (PHENERGAN) tablet 12.5 mg  12.5 mg Oral Q6H PRN Eugene Vargas DO        Or    ondansetron [facility] injection 4 mg  4 mg IntraVENous Q6H PRN Uriah Mutter, DO        nitroglycerin (TRIDIL) 400 mcg/ml infusion  25 mcg/min IntraVENous CONTINUOUS May Castillo DO 3.8 mL/hr at 02/14/22 1042 25 mcg/min at 02/14/22 1042    doxercalciferoL (HECTOROL) 4 mcg/2 mL injection 1 mcg  1 mcg IntraVENous DIALYSIS MAGDY CONNOR & Trae Balderas MD   1 mcg at 02/14/22 1616    [START ON 2/15/2022] atovaquone (MEPRON) 750 mg/5 mL oral suspension 750 mg  750 mg Oral DAILY WITH BREAKFAST Ember Jackson DO        azithromycin [facility]) tablet 1,200 mg  1,200 mg Oral Q7D Tegan Jackson DO   1,200 mg at 02/14/22 1817    sodium chloride (NS) flush 5-40 mL  5-40 mL IntraVENous Q8H Uriah Mutter, DO   10 mL at 02/14/22 1625    sodium chloride (NS) flush 5-40 mL  5-40 mL IntraVENous PRN Uriah Mutter, DO            Physical Exam:     Physical Exam:   General:  Alert, cooperative, no distress, appears stated age. Neck: Supple, symmetrical, trachea midline, no adenopathy, thyroid: no enlargement/tenderness/nodules, no carotid bruit and no JVD. Lungs:   Clear to auscultation bilaterally. Heart:  Regular rate and rhythm, S1, S2 normal, no murmur, click, rub or gallop. Abdomen:   Soft, non-tender. Bowel sounds normal. No masses,  No organomegaly.    Extremities: Extremities normal, atraumatic, no cyanosis or edema, Right IJ TDC, has thrill & Bruit on AVG, mild tender & swollen   Skin: Skin color, texture, turgor normal. No rashes or lesions         Data Review:    CBC w/Diff    Recent Labs     02/13/22 2115   WBC 2.8*   RBC 3.76*   HGB 10.5*   HCT 35.6   MCV 94.7   MCH 27.9   MCHC 29.5*   RDW 14.5    Recent Labs     02/13/22 2115   MONOS 11*   EOS 3   BASOS 0   RDW 14.5        Comprehensive Metabolic Profile    Recent Labs     02/13/22 2115      K 3.5      CO2 29   BUN 44*   CREA 6.53*    Recent Labs     02/13/22 2115   CA 7.9*   ALB 3.2*   TP 7.5   TBILI 0.4        XR CHEST PORT: Patient Communication Released  Seen       Study Result    Narrative & Impression   EXAM: PORTABLE CHEST 2112 hours     CLINICAL HISTORY/INDICATION: sob worsening over several days, exacerbated by  laying down, decreased oxygen saturation, hypertensive, on dialysis with for  chronic dialysis one day ago, history of AIDS,         COMPARISON: Chest x-ray 10/24/2021, 3/3/2021.     TECHNIQUE: Single AP view     FINDINGS:      Cardiac silhouette is top normal in size to mildly enlarged. Mildly increased  interstitial markings seen bilaterally. Pulmonary vessels are cephalized and  ill-defined. The costophrenic angles are sharp. Double-lumen right jugular  approach central catheter terminates at the distal superior vena cava. .     IMPRESSION     Mild interstitial edema                       Impression:       Active Hospital Problems    Diagnosis Date Noted    Secondary hyperparathyroidism of renal origin ([facility] 02/14/2022    Volume overload 10/25/2021    Respiratory failure ([facility] 10/25/2021    ESRD (end stage renal disease) on dialysis ([facility] 10/25/2021    Hypertension 10/25/2021            Plan: On Dialysis with 3 K, 2.5 ca bath UF as BP tolerates, removed 1.5 kg,BP is better, needs to start her Regular home BP medicine. Await doppler study of her AVG.       Felecia Renteria MD

## 2022-02-15 NOTE — PROGRESS NOTES
0700-received report from off going nurse DAYA Stockton.    1500-placed pt on room air. Pt oxygen saturation 96%. 1800-pt accidentally removed peripheral IV from right forearm.

## 2022-02-15 NOTE — PROGRESS NOTES
[facility]  (A Division of [address] [place] [address])    Follow-up Note      Date of Admission: 2/13/2022       Date of Note:  2/15/2022          Current Antimicrobials:    Prior Antimicrobials:        Assessment:         Acute hypoxic respiratory failure: Initially requiring BiPAP. Most likely related to volume overload and hypertensive emergency chest x-ray with pulmonary edema. Cannot entirely rule out underlying PJP pneumonia. Cryptococcus Ag negative  HIV/AIDS: On tenofovir, Tivicay, Prezista/ritonavir, 3TC per documentation of prior to admission meds.; based on review of viral loads over the last 2 years it appears as though she become less compliant with her antiretroviral regimen. She is not sure who follows her locally for HIV but had moved from [place], [place] about 1 year ago. Previously on Biktarvy (bictegravir-emtricitibine-TNF)              -1/20/2022 HIV viral load 108,000, absolute CD4 16              -9/28/2020 genotype reviewed-no predicted integrates resistance   - 2/14 Cryptococcal Ag negative, RPR non-reactive, CD4 14  Chronic active hepatitis B: Last HBV PCR was 526 on 1/20/2022; hepatitis B E antigen negative on 3/29/2021   - 2/14 HBsAb +, HBsAg negative  End-stage renal disease on hemodialysis  History of syphilis: Last known RPR titer 1.8 about 1 years ago  Reported history of latent TB- quantiferron gold from 9/28/2020 negative  Hx substance abuse:  Prior documentation of cocaine use    Plan:   Discussed with Dr. Christiana Kuhn- marcia plans for HD today. Continue antiretrovirals renally dosed-encourage patient compliance. HIV viral load, CMV PCR,HBV PCR, HCV PCR, fungitell pending.   CBC, CMP in am  Sputum for routine and fungal cultures as well as silver stain to rule out PJP  Continue atovaquone in setting of end-stage renal disease and need for PGP prophylaxis  Continue azithromycin for MAC prophylaxis given last known CD4 16     Can schedule as OP in my clinic to follow for HIV management. I left my card on her tray table, but I will also try to call her daughter later this afternoon as well    Discussed with Dr. Theo Tracey. Robert Parents, [facility]  [address], [address] [facility], [facility]  Office: [phone]  Mobile/Text: [phone]     Microbiology:   respiratory viral panel: Negative for all pathogens tested    Lines / Catheters:  peripheral    Subjective:   Patient seen and examined at bedside. On her way for vascular studies. No fevers, chills, n/v/d. Denies any more SOB today. No particular complaints. Objective:        Visit Vitals  /82 (BP 1 Location: Right upper arm, BP Patient Position: Lying)   Pulse 90   Temp 98.8 °F (37.1 °C)   Resp 24   Ht 5' 1\" (1.549 m)   Wt 54.4 kg (120 lb)   SpO2 99%   BMI 22.67 kg/m²     Temp (24hrs), Av.5 °F (36.9 °C), Min:98.4 °F (36.9 °C), Max:98.8 °F (37.1 °C)        General:   awake alert and oriented, chronically ill-appearing   Skin:   no rashes or skin lesions noted on limited exam, dry and warm   HEENT:  No scleral icterus or pallor; oral mucosa moist, lips moist, poor dentition   Lymph Nodes:   not assessed today   Lungs:   non, labored; bilaterally clear to aspiration- no crackles wheezes rales or rhonchi   Heart:  RRR, s1 and s2; no murmurs rubs or gallops; no edema, + pedal pulses   Abdomen:  soft, non-distended, active bowel sounds, non-tender   Genitourinary:  deferred   Extremities:   average muscle tone; no contractures, no joint effusions   Neurologic:  No gross focal motor or sensory abnormalities; CN 2-12 intact; Follows commands. Psychiatric:   appropriate and interactive.          Lab results:    Chemistry  Recent Labs     02/15/22  0427 02/15/22  0426 02/13/22  2115   GLU  --  71* 127*   NA  --  136 142   K  --  4.3 3.5   CL  --  100 107   CO2  --  29 29   BUN  --  26* 44*   CREA  --  5.28* 6.53*   CA 7.9* 8.1* 7.9*   AGAP  --  7 6   BUCR  --  5* 7*   AP  --   --  98   TP  --   --  7.5   ALB  --   --  3.2*   GLOB  --   --  4.3*   AGRAT  --   --  0.7*       CBC w/ Diff  Recent Labs     02/15/22  0426 02/14/22  1520 02/13/22  2115   WBC 2.2* 2.3* 2.8*   RBC 3.32*  --  3.76*   HGB 9.3* 8.3* 10.5*   HCT 30.9* 25.2* 35.6    161 188   GRANS 54 62 51   LYMPH 25  --  34   EOS 5 4 3       Microbiology  All Micro Results     Procedure Component Value Units Date/Time    CRYPTOCOCCUS AG W/REFLEX TITER [[id number]] Collected: 02/14/22 1819    Order Status: Completed Specimen: Serum from Blood Updated: 02/15/22 1032     Cryptococcus Ag Negative       CMV BY PCR, QT [[id number]] Collected: 02/14/22 1819    Order Status: Completed Specimen: Serum Updated: 02/14/22 1835    P.JIROVECI BY PCR [[id number]]     Order Status: Sent     RESPIRATORY VIRUS PANEL W/COVID-19, PCR [[id number]] Collected: 02/14/22 0127    Order Status: Completed Specimen: Nasopharyngeal Updated: 02/14/22 0248     Adenovirus Not detected        Coronavirus 229E Not detected        Coronavirus HKU1 Not detected        Coronavirus CVNL63 Not detected        Coronavirus OC43 Not detected        SARS-CoV-2, PCR Not detected        Metapneumovirus Not detected        Rhinovirus and Enterovirus Not detected        Influenza A Not detected        Influenza B Not detected        Parainfluenza 1 Not detected        Parainfluenza 2 Not detected        Parainfluenza 3 Not detected        Parainfluenza virus 4 Not detected        RSV by PCR Not detected        B. parapertussis, PCR Not detected        Bordetella pertussis - PCR Not detected        Chlamydophila pneumoniae DNA, QL, PCR Not detected        Mycoplasma pneumoniae DNA, QL, PCR Not detected              Conchita Barboza DO   2/15/2022

## 2022-02-15 NOTE — PROGRESS NOTES
Reason for Admission:  Volume overload [E87.70]  Respiratory failure ([address] [J96.90]                 RUR Score:    14           Plan for utilizing home health: To be determined                      Likelihood of Readmission:   LOW                         Transition of Care Plan:              Initial assessment completed with patient's daughter Venkata Jesus. Cognitive status of patient: disoriented. Face sheet information confirmed:  yes. The patient's daughter Leslie Kyle participate in her discharge plan and to receive any needed information. This patient lives in a home with daughter and her family. Patient is not able to navigate steps as needed. Prior to hospitalization, patient was considered to be independent with ADLs/IADLS : no . If not independent,  patient needs assist with : bathing, food preparation and cooking    Patient has a current ACP document on file: no      Healthcare Decision Maker:   Primary Decision Maker: Lauren Pita Simpson - [phone]    Click here to complete [address] including selection of the Healthcare Decision Maker Relationship (ie \"Primary\")    The patient and daughter will be available to transport patient home upon discharge. The patient already has Lynnda Leventhal, W/C, Shower chair medical equipment available in the home. Patient is not currently active with home health. Patient has not stayed in a skilled nursing facility or rehab. Was  stay within last 60 days : no. This patient is on dialysis : yes     If yes, hemodialysis patient and receives treatment on Tuesday/Thursday/Saturday at [facility] time is 12:15. Pt is transported to/from dialysis by Medicaid transport. List of available Home Health agencies were provided and reviewed with the patient prior to discharge. Freedom of choice signed: yes, for any accepting home health  agency. Currently, the discharge plan is Home with [address] [facility].     The patient's daughter assists with her medications as directed. Patient's current insurance is Medicaid      Care Management Interventions  PCP Verified by CM: Yes  Palliative Care Criteria Met (RRAT>21 & CHF Dx)?: No  Mode of Transport at Discharge:  Other (see comment) (family)  Transition of Care Consult (CM Consult): Discharge Planning  Support Systems: Child(joanne),Other Family Member(s)  Confirm Follow Up Transport: Family  The Patient and/or Patient Representative was Provided with a Choice of Provider and Agrees with the Discharge Plan?: Yes  Name of the Patient Representative Who was Provided with a Choice of Provider and Agrees with the Discharge Plan: pt's daughter Monique Thomas of Choice List was Provided with Basic Dialogue that Supports the Patient's Individualized Plan of Care/Goals, Treatment Preferences and Shares the Quality Data Associated with the Providers?: Yes  Discharge Location  Patient Expects to be Discharged to[de-identified] Home with home health        CareURBAN Staples RN  Care Management  Pager: [phone]

## 2022-02-15 NOTE — PROGRESS NOTES
Problem: Mobility Impaired (Adult and Pediatric)  Goal: *Acute Goals and Plan of Care (Insert Text)  Description: Physical Therapy Goals  Initiated 2/15/2022 and to be accomplished within 7 day(s)  1. Patient will move from supine to sit and sit to supine , scoot up and down, and roll side to side in bed with modified independence. 2.  Patient will transfer from bed to chair and chair to bed with modified independence using the least restrictive device. 3.  Patient will perform sit to stand with modified independence. 4.  Patient will ambulate with modified independence for 100 feet with the least restrictive device. 5.  Patient will ascend/descend 4 stairs with 1 handrail(s) with minimal assistance/contact guard assist.  6.  Patient will perform BLE therex as prescribed to tolerance to BlE    PLOF: pt was sup/mod ind level, lives with daughter in [address] [place] [address] with 3-4 FILEMON, no DME     Outcome: Progressing Towards Goal     PHYSICAL THERAPY EVALUATION    Patient: Leigha Romano (65 y.o. female)  Date: 2/15/2022  Primary Diagnosis: Volume overload [E87.70]  Respiratory failure ([address] [J96.90]        Precautions:      WBAT  PLOF: see above     ASSESSMENT :  Based on the objective data described below, the patient presents with decreased activity tolerance and functional mobility. Pt seen sitting EOB and on 3L via NC. Pt is agreeable, endorses no pain but states she needs to void. Pt performs SPT to [facility] with CGA for safety and steadying, SBA for pericare. Once through, pt given RW to amb 10 ft next to bed with CGA for safety, no LOB noted and increased stability noted. Pt returned to sitting EOB, all needs met. Pt will be seen for 1-2 more visits to ensure safe mobility in prep for discharge home as she is close to baseline. Will continue to follow per POC. Patient will benefit from skilled intervention to address the above impairments.   Patient's rehabilitation potential is considered to be Good  Factors which may influence rehabilitation potential include:   []         None noted  []         Mental ability/status  [x]         Medical condition  []         Home/family situation and support systems  []         Safety awareness  []         Pain tolerance/management  []         Other:      PLAN :  Recommendations and Planned Interventions:   [x]           Bed Mobility Training             [x]    Neuromuscular Re-Education  [x]           Transfer Training                   []    Orthotic/Prosthetic Training  [x]           Gait Training                          []    Modalities  [x]           Therapeutic Exercises           []    Edema Management/Control  [x]           Therapeutic Activities            [x]    Family Training/Education  [x]           Patient Education  []           Other (comment):    Frequency/Duration: Patient will be followed by physical therapy 1-2 times per day/3-5 days per week to address goals. Discharge Recommendations: Home Health  Further Equipment Recommendations for Discharge: rolling walker     SUBJECTIVE:   Patient stated I fall sometimes.     OBJECTIVE DATA SUMMARY:     Past Medical History:   Diagnosis Date    Acute kidney injury ([facility]     AIDS (acquired immune deficiency syndrome) ([facility]     Anemia     Esophageal candidiasis (HCC)     ESRD (end stage renal disease) (HCC)     HCV (hepatitis C virus)     Hepatitis B     Hepatitis C     HIV (human immunodeficiency virus infection) ([facility]     HIV (human immunodeficiency virus infection) ([facility]     Late latent syphilis     Pulmonary TB     Schizoaffective disorder ([facility]     Substance abuse ([facility]      Past Surgical History:   Procedure Laterality Date    HX GYN      HX NEPHRECTOMY      left kidney removed at age 15    HX NEPHRECTOMY       Barriers to Learning/Limitations: yes;  physical  Compensate with: Visual Cues, Verbal Cues, and Tactile Cues  Home Situation:  Home Situation  Home Environment: Private residence  # Steps to Enter: 4  One/Two Story Residence: One story  Living Alone: No  Support Systems: Child(joanne)  Patient Expects to be Discharged to[de-identified] Home with home health  Current DME Used/Available at Home: None  Critical Behavior:  Neurologic State: Confused  Orientation Level: Oriented to person;Oriented to place  Cognition: Follows commands  Safety/Judgement: Fall prevention  Psychosocial  Patient Behaviors: Calm; Cooperative  Needs Expressed: Educational  Purposeful Interaction: Yes  Pt Identified Daily Priority: Clinical issues (comment)  [employer] Process: Establish trust;Teaching/learning; Attend basic human needs  Caring Interventions: Reassure; Therapeutic modalities  Reassure: Informing;Caring rounds  Therapeutic Modalities: Intentional therapeutic touch  Skin Condition/Temp: Dry;Flaky     Skin Integrity: Intact  Skin Integumentary  Skin Color: Appropriate for ethnicity  Skin Condition/Temp: Dry;Flaky  Skin Integrity: Intact     Strength:    Strength: Generally decreased, functional                    Tone & Sensation:   Tone: Normal       Sensation: Intact               Range Of Motion:  AROM: Within functional limits       Functional Mobility:  Bed Mobility:     Supine to Sit: Supervision  Sit to Supine: Supervision  Scooting: Supervision  Transfers:  Sit to Stand: Stand-by assistance  Stand to Sit: Stand-by assistance  Stand Pivot Transfers: Contact guard assistance ( bed <> BSC )           Balance:   Sitting: Intact  Standing: Impaired; Without support  Standing - Static: Fair  Standing - Dynamic : Fair    Ambulation/Gait Training:  Distance (ft): 8 Feet (ft)  Assistive Device: Walker, rolling  Ambulation - Level of Assistance: Contact guard assistance        Gait Abnormalities: Decreased step clearance        Base of Support: Narrowed     Speed/Karen: Pace decreased (<100 feet/min)  Step Length: Right shortened;Left shortened    Pain:  Pain level pre-treatment: 0/10   Pain level post-treatment: 0/10   Pain Intervention(s) : Medication (see MAR); Rest, Ice, Repositioning  Response to intervention: Nurse notified    Activity Tolerance:   Good    Please refer to the flowsheet for vital signs taken during this treatment. After treatment:   []         Patient left in no apparent distress sitting up in chair  [x]         Patient left in no apparent distress in bed  [x]         Call bell left within reach  [x]         Nursing notified  []         Caregiver present  []         Bed alarm activated  []         SCDs applied    COMMUNICATION/EDUCATION:   [x]         Role of Physical Therapy in the acute care setting. [x]         Fall prevention education was provided and the patient/caregiver indicated understanding. [x]         Patient/family have participated as able in goal setting and plan of care. [x]         Patient/family agree to work toward stated goals and plan of care. []         Patient understands intent and goals of therapy, but is neutral about his/her participation. []         Patient is unable to participate in goal setting/plan of care: ongoing with therapy staff.  []         Other:     Thank you for this referral.  Claudene Plater   Time Calculation: 10 mins      Eval Complexity: History: MEDIUM  Complexity : 1-2 comorbidities / personal factors will impact the outcome/ POC Exam:MEDIUM Complexity : 3 Standardized tests and measures addressing body structure, function, activity limitation and / or participation in recreation  Presentation: MEDIUM Complexity : Evolving with changing characteristics  Clinical Decision Making:Medium Complexity    Overall Complexity:MEDIUM

## 2022-02-15 NOTE — PROGRESS NOTES
Problem: Self Care Deficits Care Plan (Adult)  Goal: *Acute Goals and Plan of Care (Insert Text)  Description: Occupational Therapy Goals  Initiated 2/15/2022 within 7 day(s). 1.  Patient will perform grooming in stance at sink level with supervision assist and F+ balance. 2.  Patient will perform upper body bathing and upper body dressing with modified independence. 3.  Patient will perform bathing and lower body dressing with modified independence. 4.  Patient will perform toilet transfers with supervision/set-up. 5.  Patient will perform all aspects of toileting with modified independence. 6.  Patient will participate in upper extremity therapeutic exercise/activities with supervision/set-up for 5-7 minutes. 7.  Patient will utilize energy conservation techniques during functional activities with verbal cues. Prior Level of Function:Pt reports performing all ADLs with MI and having difficulty stepping into tub/shower at home. Outcome: Progressing Towards Goal   OCCUPATIONAL THERAPY EVALUATION    Patient: Crystal Hanna (66 y.o. female)  Date: 2/15/2022  Primary Diagnosis: Volume overload [E87.70]  Respiratory failure ([facility] [J96.90]        Precautions:   Fall  PLOF: see above    ASSESSMENT :  Based on the objective data described below, the patient presents with decreased Ub strength, impaired standing balance, exertional dyspnea and functional mobility decreasing safety and independence in self care. Pt semi reclined in bed and agreeable to OT eval at beginning of session. Pt A&O x 3. Pt with nasal cannula of 2 L O2 and remaining at >95% saturated throughout session. Pt performed supine>sit with SUP and requested to urinate. Pt performed SPT from bed>BSC with no AE and CGA due to unsteady balance. Pt performed hygiene with stand by assist in stance and returned to sitting EOB. Pt performed doffing socks, applying lotion with SUP and donning new socks SUP.  Pt required min A to doff/torres new gown due to multiple monitoring cords/Ivs. Pt performed simple grooming with set up. Pt demonstrates 4-/5 strength BUE and reports pain in LUE proximally with noted edema/nodule of that area . Pt sitting edge of bed drinking juice at end of session. Pt educated on not getting out of bed alone and bed alarm set with call bell within reach of patient. Patient will benefit from skilled intervention to address the above impairments. Patient's rehabilitation potential is considered to be Good  Factors which may influence rehabilitation potential include:   []             None noted  [x]             Mental ability/status  []             Medical condition  [x]             Home/family situation and support systems  [x]             Safety awareness  []             Pain tolerance/management  []             Other:      PLAN :  Recommendations and Planned Interventions:   [x]               Self Care Training                  [x]      Therapeutic Activities  [x]               Functional Mobility Training   []      Cognitive Retraining  [x]               Therapeutic Exercises           [x]      Endurance Activities  [x]               Balance Training                    []      Neuromuscular Re-Education  []               Visual/Perceptual Training     [x]      Home Safety Training  [x]               Patient Education                   [x]      Family Training/Education  []               Other (comment):    Frequency/Duration: Patient will be followed by occupational therapy 1-2 times per day/4-7 days per week to address goals. Discharge Recommendations: Home Health with family assist   Further Equipment Recommendations for Discharge: [place] [facility]     SUBJECTIVE:   Patient stated I like Mike Celaya.     OBJECTIVE DATA SUMMARY:     Past Medical History:   Diagnosis Date    Acute kidney injury ([facility]     AIDS (acquired immune deficiency syndrome) ([facility]     Anemia     Esophageal candidiasis ([facility] [facility]     ESRD (end stage renal disease) ([facility]     HCV (hepatitis C virus)     Hepatitis B     Hepatitis C     HIV (human immunodeficiency virus infection) ([facility]     HIV (human immunodeficiency virus infection) ([facility]     Late latent syphilis     Pulmonary TB     Schizoaffective disorder ([facility]     Substance abuse ([place] [address].)      Past Surgical History:   Procedure Laterality Date    HX GYN      HX NEPHRECTOMY      left kidney removed at age 15    HX NEPHRECTOMY       Barriers to Learning/Limitations: yes;  cognitive  Compensate with: visual, verbal, tactile, kinesthetic cues/model    Home Situation:   Home Situation  Home Environment: Private residence  # Steps to Enter: 4  One/Two Story Residence: One story  Living Alone: No  Support Systems: Child(joanne)  Patient Expects to be Discharged to[de-identified] Home with home health  Current DME Used/Available at Home: None  Tub or Shower Type: Tub/Shower combination  [x]  Right hand dominant   []  Left hand dominant    Cognitive/Behavioral Status:  Neurologic State: Alert  Orientation Level: Oriented to person;Oriented to place;Oriented to time  Cognition: Follows commands  Safety/Judgement: Fall prevention    Skin: intact BUE  Edema: nodule/edema proximal LUE    Vision/Perceptual:       Appears intact       Coordination: BUE  Coordination: Within functional limits  Fine Motor Skills-Upper: Left Intact; Right Intact    Gross Motor Skills-Upper: Left Intact; Right Intact  Balance:  Sitting: Intact  Standing: Impaired; Without support  Standing - Static: Fair  Standing - Dynamic : Fair  Strength: BUE  Strength: Generally decreased, functional   Tone & Sensation: BUE  Tone: Normal  Sensation: Intact   Range of Motion: BUE  AROM: Within functional limits   Functional Mobility and Transfers for ADLs:  Bed Mobility:  Supine to Sit: Supervision  Sit to Supine: Supervision  Scooting: Supervision  Transfers:  Sit to Stand: Contact guard assistance  Stand to Sit: Stand-by assistance  Stand Pivot Transfers: Contact guard assistance ( bed <> BSC )    Toilet Transfer : Contact guard assistance    ADL Assessment:    Oral Facial Hygiene/Grooming: Modified Independent  Bathing: Supervision (simulated with applying lotion)  Upper Body Dressing: Minimum assistance (gown)  Lower Body Dressing: Supervision  Toileting: Stand by assistance     ADL Intervention:     Cognitive Retraining  Safety/Judgement: Fall prevention    Pain:  Pain level pre-treatment: 0/10   Pain level post-treatment: 0/10   Pain Intervention(s): Medication (see MAR); Rest, Repositioning   Response to intervention: Nurse notified, See doc flow    Activity Tolerance:   Good   Please refer to the flowsheet for vital signs taken during this treatment. After treatment:   [] Patient left in no apparent distress sitting up in chair  [x] Patient left in no apparent distress in bed  [x] Call bell left within reach  [x] Nursing notified  [] Caregiver present  [x] Bed alarm activated    COMMUNICATION/EDUCATION:   [x] Role of Occupational Therapy in the acute care setting  [x] Home safety education was provided and the patient/caregiver indicated understanding. [x] Patient/family have participated as able in goal setting and plan of care. [x] Patient/family agree to work toward stated goals and plan of care. [] Patient understands intent and goals of therapy, but is neutral about his/her participation. [] Patient is unable to participate in goal setting and plan of care. Thank you for this referral.  Tanisha Barr OT  Time Calculation: 24 mins    Eval Complexity: History: MEDIUM Complexity : Expanded review of history including physical, cognitive and psychosocial  history ; Examination: MEDIUM Complexity : 3-5 performance deficits relating to physical, cognitive , or psychosocial skils that result in activity limitations and / or participation restrictions; Decision Making:MEDIUM Complexity : Patient may present with comorbidities that affect occupational performnce. Miniml to moderate modification of tasks or assistance (eg, physical or verbal ) with assesment(s) is necessary to enable patient to complete evaluation

## 2022-02-15 NOTE — PROGRESS NOTES
Progress Note    Patient: Regina Adair MRN: [id number]  SSN: [id number]    YOB: 1960  Age: 63 y.o. Sex: female      Admit Date: 2/13/2022    LOS: 2 days     Subjective:     Patient seen in room today. Mentation more clear than yesterday. Does not appear as fatigued. Sitting up on side of bed eating lunch. Objective:     Vitals:    02/15/22 0625 02/15/22 0721 02/15/22 0855 02/15/22 1108   BP: (!) 143/95 137/89 (!) 157/98 123/82   Pulse: 93 85 92 90   Resp: 22 24 24   Temp: 98.6 °F (37 °C) 98.4 °F (36.9 °C)  98.8 °F (37.1 °C)   SpO2: 100%   99%   Weight:       Height:            Intake and Output:  Current Shift: 02/15 0701 - 02/15 1900  In: 56.8 [I.V.:56.8]  Out: -   Last three shifts: 02/13 1901 - 02/15 0700  In: 156.6 [I.V.:156.6]  Out: 1800     Physical Exam:   General:          Alert, cooperative, sitting comfortably on side of bed   Head:               Normocephalic, without obvious abnormality, atraumatic. Eyes:               Conjunctivae clear, anicteric sclerae. Pupils are equal  Neck:               Supple, symmetrical,  no adenopathy, thyroid: non tender                          no carotid bruit and no JVD. Back:               Symmetric,  No CVA tenderness. Lungs:             Faint crackles at bases bilaterally with globally decreased breath sounds. On room air. Chest wall:      No tenderness or deformity. No Accessory muscle use. Heart:              Regular rate and rhythm,  no murmur, rub or gallop. Abdomen:        Soft, Not distended. Bowel sounds normal. No masses  Extremities:     Extremities normal, atraumatic, No cyanosis. No edema peripherally appreciated. No clubbing  Skin:                Texture, turgor normal.  Multiple hyper and hypopigmented skin lesions noted across arms and legs not Jaundiced  Lymph nodes: Cervical, supraclavicular normal.  Psych:             Good insight. Not depressed. Not anxious or agitated. Neurologic:      EOMs intact.  No facial asymmetry. No aphasia or slurred speech. Normal            strength, Alert and oriented X 3. Lab/Data Review: All lab results for the last 24 hours reviewed. Assessment & Plan :     1) Acute hypoxic respiratory failure likely secondary to fluid overload, HTN emergency - now resolved. Patient initially required bipap. She is now on room air following dialysis. 2) HTN emergency - patient continue on nitroglycerin drip at 25 mcg/min. Will decrease down to 20 mcg/min overnight. Increase losartan from 50 mg to 100 mg for morning. Will likely need addition of hydralazine tomorrow. Will observe this regimen for now. 3) HIV, Hep B, Hep C, TB, latent syphilis - ID following. Patient restarted on antivirals. Counts pending. 4) ESRD on HD - nephro following  5) Hx of substance abuse - h/o cocaine use. Patient denies current use     DISPO: patient lives with daughter. She has voiced concerns that she is not cared for adequately at home - unable to get to dialysis, doctor's appts, etc. Would like LTC if possible.      Signed By: Sapna Santana DO     February 15, 2022

## 2022-02-15 NOTE — PROGRESS NOTES
Problem: Falls - Risk of  Goal: *Absence of Falls  Description: Document Patrice Blight Fall Risk and appropriate interventions in the flowsheet.   Outcome: Progressing Towards Goal  Note: Fall Risk Interventions:  Mobility Interventions: Bed/chair exit alarm,Patient to call before getting OOB    Mentation Interventions: Bed/chair exit alarm,More frequent rounding         Elimination Interventions: Call light in reach,Toileting schedule/hourly rounds              Problem: Patient Education: Go to Patient Education Activity  Goal: Patient/Family Education  Outcome: Progressing Towards Goal     Problem: Impaired Skin Integrity/Pressure Injury Treatment  Goal: *Improvement of Existing Pressure Injury  Outcome: Progressing Towards Goal     Problem: Patient Education: Go to Patient Education Activity  Goal: Patient/Family Education  Outcome: Progressing Towards Goal     Problem: Pain  Goal: *Control of Pain  Outcome: Progressing Towards Goal     Problem: Patient Education: Go to Patient Education Activity  Goal: Patient/Family Education  Outcome: Progressing Towards Goal

## 2022-02-15 NOTE — PROGRESS NOTES
Progress Note    Adri Pedroza  63 y.o. Admit Date: 2/13/2022  Active Problems:    ESRD (end stage renal disease) on dialysis ([facility] (10/25/2021) POA: Yes      Volume overload (10/25/2021) POA: Yes      Hypertension (10/25/2021) POA: Yes      Respiratory failure ([facility] (10/25/2021) POA: Yes      Secondary hyperparathyroidism of renal origin ([facility] (2/14/2022) POA: Unknown            Subjective:     Patient feels good, no SOB, not much pain . Going for Doppler study of AVG      A comprehensive review of systems was negative except for that written in the History of Present Illness.     Objective:     Visit Vitals  /82 (BP 1 Location: Right upper arm, BP Patient Position: Lying)   Pulse 90   Temp 98.8 °F (37.1 °C)   Resp 24   Ht 5' 1\" (1.549 m)   Wt 54.4 kg (120 lb)   SpO2 99%   BMI 22.67 kg/m²         Intake/Output Summary (Last 24 hours) at 2/15/2022 1301  Last data filed at 2/15/2022 0892  Gross per 24 hour   Intake 70.87 ml   Output 1800 ml   Net -1729.13 ml       Current Facility-Administered Medications   Medication Dose Route Frequency Provider Last Rate Last Admin    sodium chloride (NS) flush 5-40 mL  5-40 mL IntraVENous Q8H Genet Flock, DO   10 mL at 02/15/22 0856    sodium chloride (NS) flush 5-40 mL  5-40 mL IntraVENous PRN Genet Flock, DO        acetaminophen (TYLENOL) tablet 650 mg  650 mg Oral Q6H PRN Genet Flock, DO   650 mg at 02/15/22 9187    Or    acetaminophen (TYLENOL) suppository 650 mg  650 mg Rectal Q6H PRN Genet Flock, DO        polyethylene glycol (MIRALAX) packet 17 g  17 g Oral DAILY PRN Genet Flock, DO        heparin (porcine) injection 5,000 Units  5,000 Units SubCUTAneous Q8H Genet Flock, DO   5,000 Units at 02/15/22 0649    promethazine (PHENERGAN) tablet 12.5 mg  12.5 mg Oral Q6H PRN Genet Flock, DO        Or    ondansetron [facility] injection 4 mg  4 mg IntraVENous Q6H PRN Genet John DO        nitroglycerin (TRIDIL) 400 mcg/ml infusion  25 mcg/min IntraVENous CONTINUOUS May Castillo DO 3.8 mL/hr at 02/14/22 1941 25 mcg/min at 02/14/22 1941    doxercalciferoL (HECTOROL) 4 mcg/2 mL injection 1 mcg  1 mcg IntraVENous DIALYSIS MON, WED & Miakeiry Cruz, Zack Andrade MD   1 mcg at 02/14/22 1616    atovaquone (MEPRON) 750 mg/5 mL oral suspension 750 mg  750 mg Oral DAILY WITH BREAKFAST [place], [place], DO   750 mg at 02/15/22 0855    azithromycin (ZITHROMAX) tablet 1,200 mg  1,200 mg Oral Q7D RenettaTegan olson, DO   1,200 mg at 02/14/22 1817    amLODIPine (NORVASC) tablet 10 mg  10 mg Oral DAILY Elif Cha MD   10 mg at 02/15/22 0855    losartan (COZAAR) tablet 50 mg  50 mg Oral DAILY Elif Cha MD   50 mg at 02/15/22 0855    sodium chloride (NS) flush 5-40 mL  5-40 mL IntraVENous Q8H Jose Felizto, DO   10 mL at 02/15/22 0856    sodium chloride (NS) flush 5-40 mL  5-40 mL IntraVENous PRN Jose Motto, DO            Physical Exam:     Physical Exam:   General:  Alert, cooperative, no distress, appears stated age. Neck: Supple, symmetrical, trachea midline, no adenopathy, thyroid: no enlargement/tenderness/nodules, no carotid bruit and no JVD. Lungs:   Clear to auscultation bilaterally. Heart:  Regular rate and rhythm, S1, S2 normal, no murmur, click, rub or gallop. Abdomen:   Soft, non-tender. Bowel sounds normal. No masses,  No organomegaly.    Extremities: Extremities normal, atraumatic, no cyanosis or edema, AVG not tender, done 6 weeks ago   Skin: Skin color, texture, turgor normal. No rashes or lesions         Data Review:    CBC w/Diff    Recent Labs     02/15/22  0426 02/14/22  1520 02/14/22  1520 02/13/22  2115 02/13/22 2115   WBC 2.2*  --  2.3*  --  2.8*   RBC 3.32*  --   --   --  3.76*   HGB 9.3*  --  8.3*  --  10.5*   HCT 30.9*  --  25.2*  --  35.6   MCV 93.1   < > 88   < > 94.7   MCH 28.0   < > 28.9   < > 27.9   MCHC 30.1*   < > 32.9   < > 29.5*   RDW 14.1   < > 14.0   < > 14.5    < > = values in this interval not displayed. Recent Labs     02/15/22  0426 02/14/22  1520 02/14/22  1520 02/13/22  2115 02/13/22  2115   MONOS 14*  --  7  --  11*   EOS 5  --  4  --  3   BASOS 1   < > 1   < > 0   RDW 14.1   < > 14.0   < > 14.5    < > = values in this interval not displayed. Comprehensive Metabolic Profile    Recent Labs     02/15/22  0426 02/13/22  2115    142   K 4.3 3.5    107   CO2 29 29   BUN 26* 44*   CREA 5.28* 6.53*    Recent Labs     02/15/22  0427 02/15/22  0426 02/13/22  2115   CA 7.9* 8.1* 7.9*   PHOS  --  5.1*  --    ALB  --   --  3.2*   TP  --   --  7.5   TBILI  --   --  0.4                        Impression:       Active Hospital Problems    Diagnosis Date Noted    Secondary hyperparathyroidism of renal origin ([facility] 02/14/2022    Volume overload 10/25/2021    Respiratory failure ([facility] 10/25/2021    ESRD (end stage renal disease) on dialysis ([facility] 10/25/2021    Hypertension 10/25/2021      Recently (2 months back) completed her TB treatment, not sure what medicine she is taking  For HIV  ID has seen. Does not have Regular HIV Doctor for her  Her Daughter & She wants to get Dialysis in [facility]      Plan:     No Dialysis today, Dialysis tomorrow, Sudhir Baires to initiate transfer her Dialysis care in [facility]. Will check Hep Core antbody  Dr. Sonia Galvez is following her HIV , her medications needs to be carefully monitored.  Dr Sonia Galvez has agreed ofollow her on long term basis        Ace Fuentes MD

## 2022-02-15 NOTE — CONSULTS
[facility]    Name:  Meliton Devi  MR#:   [id number]  :  1960  ACCOUNT #:  [de-identified]  DATE OF SERVICE:  2022    RENAL CONSULTATION    Consult was requested by hospitalist service, Dr. Helga Cronin. REASON FOR CONSULTATION:  Dialysis patient admitted with hypertensive emergency and need for dialysis for volume overload. HISTORY OF PRESENT ILLNESS:  This is a 42-year-old -American female who is not my regular dialysis patient, but comes to the emergency room off and on and last time, I did take care of her, for that I was asked to see this patient. This patient has a history of ESRD and has underlying hypertension. Besides that, she has history of hepatitis B, hepatitis C, HIV as well as old history of pulmonary tuberculosis and latent syphilis. The patient is very poor historian, does not communicate to us, just moaning with discomfort from pain on her AVG area. She lives in [place], but gets dialysis at [address] [place] [address] [facility] in [place] at [facility]. The patient is currently on nitro drip for her hypertensive emergency or high blood pressure. As per the chart, what we understand is that the patient is out of all of her medications. The patient does not know any name of the medicines, what she takes or she does not take. What we find from the note from the chart that EMS, when they brought the patient, her saturation was only 70%  oxygen and was also having high blood pressure. They had to apply Nitropaste to her chest and brought to the hospital and they also initiated BiPAP. When I had seen the patient, the patient is not on BiPAP, but also having some pain on her arm and is quite moaning.     PAST MEDICAL HISTORY:  Has history of ESRD, hepatitis C, hepatitis B, not sure if the hepatitis had been treated or not, also has HIV and AIDS, latent syphilis, pulmonary tuberculosis, substance abuse, schizoaffective disorder, and also esophageal candidiasis and anemia. PAST SURGICAL HISTORY:  Includes left kidney removed at age 17, cause not clear,    SOCIAL HISTORY:  Smoking status is not clear. Alcohol status is not clear. ALLERGIES:  TO:  1. CEFTRIAXONE. 2.  ASPIRIN. 3.  PENICILLIN. LIST OF MEDICATIONS:  1. Seroquel 25 mg tablet daily. 2.  Risperidone, not taking. 3.  Also on Prezista 800 mg tablet, it is a combination of darunavir and ethanolate. 4.  Also takes Tivicay 50 mg tablet (dolutegravir). 5.  Nexium 40 mg daily. 6.  Gabapentin 100 mg twice daily. 7.  Epivir 150 mg tablet, takes half tablet daily. 8.  Melatonin 3 mg tablet on need basis. 9.  Remeron 15 mg tablet daily. 10.  Pyridoxine (vitamin B) 50 mg tablet daily. 11.  Norvir 100 mg tablet daily. 12.  Senokot 8.6 mg tablet daily. 13.  Tenofovir 300 mg tablet daily. 14.  Bactrim one tablet by mouth every 12 hours. REVIEW OF SYSTEMS:  Unable to obtain given the patient's current status as she is not cooperative and not gives good history. PHYSICAL EXAMINATION:  VITAL SIGNS:  As follows, currently, the patient is on the nitro drip for hypertensive urgency or emergency. On admission, blood pressure was 188/113. Gradually, the blood pressure came down to 170/100. Jugular venous pressure not distended on 2 liters of oxygen, maintaining 92% to 93% oxygen saturation. LUNGS:  Decreased breath sounds. HEART:  S1 and S2 without any gallop or murmur. ABDOMEN:  Soft. No palpable mass. EXTREMITIES:  Ankles, negative edema. Has right IJ tunneled dialysis catheter. On the left arm, has an AV graft with a good thrill and bruit, mildly edematous that area, but no definitive erythema,has  Mild tenderness,    LABORATORY DATA:  Relevant labs on admission:  WBC only 2.8 with a hemoglobin of 10.5, hematocrit 35.6, platelets of 186,625.   Chemistry on admission:  Sodium 143, potassium 3.5, chloride 107, CO2 of 29, glucose of 127, blood urea nitrogen of 44, creatinine of 6.53, calcium 7.9. Total protein of 7.5 with the albumin of 3.2. BNP of 34,542. Chest x-ray was done on admission, shows mild interstitial edema, also has a dialysis catheter, tip of the catheter at the junction of the right atrium and the superior vena cava. EKG:  Normal sinus rhythm, minimal voltage criteria for the LVH. IMPRESSION:  1. End-stage renal disease. 2.  Hypertensive emergency. 3.  HIV. 4.  Noncompliance. 5.  Anemia. 6.  Complication of AV graft on the left arm. PLAN:  The patient will be dialyzed in her room given the patient is on nitro drip and also Dopplers have been ordered to evaluate her complication of AV graft. The patient needs some pain medicine to control the pain. HIV medicine has to be adjusted, exactly what she is taking or not. The patient's blood pressure needs to be controlled by giving medicine, looks like she is not on any oral blood pressure medicine. If the patient, otherwise, remains stable or once she feels better and the other status improve, probably she could be discharged from the renal point of view.         MD AMALIA [id number]  D:  02/14/2022 21:24  T:  02/15/2022 6:33  JOB #:  [id number]

## 2022-02-16 LAB
ANION GAP SERPL CALC-SCNC: 9 MMOL/L (ref 3–18)
BASOPHILS # BLD: 0 K/UL (ref 0–0.1)
BASOPHILS NFR BLD: 1 % (ref 0–2)
BUN SERPL-MCNC: 40 MG/DL (ref 7–18)
BUN/CREAT SERPL: 5 (ref 12–20)
CALCIUM SERPL-MCNC: 7.7 MG/DL (ref 8.5–10.1)
CHLORIDE SERPL-SCNC: 101 MMOL/L (ref 100–111)
CO2 SERPL-SCNC: 25 MMOL/L (ref 21–32)
CREAT SERPL-MCNC: 7.3 MG/DL (ref 0.6–1.3)
DIFFERENTIAL METHOD BLD: ABNORMAL
EOSINOPHIL # BLD: 0.1 K/UL (ref 0–0.4)
EOSINOPHIL NFR BLD: 6 % (ref 0–5)
ERYTHROCYTE [DISTWIDTH] IN BLOOD BY AUTOMATED COUNT: 14 % (ref 11.6–14.5)
GLUCOSE SERPL-MCNC: 76 MG/DL (ref 74–99)
HBV GENOTYPE: NORMAL
HBV IU/ML, 551649: 380 IU/ML
HCT VFR BLD AUTO: 29.4 % (ref 35–45)
HGB BLD-MCNC: 9 G/DL (ref 12–16)
HIV1 RNA # SERPL NAA+PROBE: NORMAL COPIES/ML
HIV1 RNA SERPL NAA+PROBE-LOG#: 5.66 LOG10COPY/ML
IMM GRANULOCYTES # BLD AUTO: 0 K/UL (ref 0–0.04)
IMM GRANULOCYTES NFR BLD AUTO: 1 % (ref 0–0.5)
LOG10 HBV AS IU/ML 551596: 2.58 LOG10 IU/ML
LYMPHOCYTES # BLD: 0.5 K/UL (ref 0.9–3.6)
LYMPHOCYTES NFR BLD: 26 % (ref 21–52)
MCH RBC QN AUTO: 28.3 PG (ref 24–34)
MCHC RBC AUTO-ENTMCNC: 30.6 G/DL (ref 31–37)
MCV RBC AUTO: 92.5 FL (ref 78–100)
MONOCYTES # BLD: 0.4 K/UL (ref 0.05–1.2)
MONOCYTES NFR BLD: 18 % (ref 3–10)
NEUTS SEG # BLD: 0.9 K/UL (ref 1.8–8)
NEUTS SEG NFR BLD: 49 % (ref 40–73)
NRBC # BLD: 0 K/UL (ref 0–0.01)
NRBC BLD-RTO: 0 PER 100 WBC
PHOSPHATE SERPL-MCNC: 6.5 MG/DL (ref 2.5–4.9)
PLATELET # BLD AUTO: 166 K/UL (ref 135–420)
PMV BLD AUTO: 10.5 FL (ref 9.2–11.8)
POTASSIUM SERPL-SCNC: 4.5 MMOL/L (ref 3.5–5.5)
RBC # BLD AUTO: 3.18 M/UL (ref 4.2–5.3)
SODIUM SERPL-SCNC: 135 MMOL/L (ref 136–145)
TEST INFORMATION: NORMAL
WBC # BLD AUTO: 1.9 K/UL (ref 4.6–13.2)

## 2022-02-16 PROCEDURE — 36415 COLL VENOUS BLD VENIPUNCTURE: CPT

## 2022-02-16 PROCEDURE — 80048 BASIC METABOLIC PNL TOTAL CA: CPT

## 2022-02-16 PROCEDURE — 65660000004 HC RM CVT STEPDOWN

## 2022-02-16 PROCEDURE — 65660000001 HC RM ICU INTERMED STEPDOWN

## 2022-02-16 PROCEDURE — 86704 HEP B CORE ANTIBODY TOTAL: CPT

## 2022-02-16 PROCEDURE — 74011000250 HC RX REV CODE- 250: Performed by: INTERNAL MEDICINE

## 2022-02-16 PROCEDURE — 74011250637 HC RX REV CODE- 250/637: Performed by: INTERNAL MEDICINE

## 2022-02-16 PROCEDURE — 99233 SBSQ HOSP IP/OBS HIGH 50: CPT | Performed by: STUDENT IN AN ORGANIZED HEALTH CARE EDUCATION/TRAINING PROGRAM

## 2022-02-16 PROCEDURE — 74011250637 HC RX REV CODE- 250/637: Performed by: STUDENT IN AN ORGANIZED HEALTH CARE EDUCATION/TRAINING PROGRAM

## 2022-02-16 PROCEDURE — 90935 HEMODIALYSIS ONE EVALUATION: CPT

## 2022-02-16 PROCEDURE — 84100 ASSAY OF PHOSPHORUS: CPT

## 2022-02-16 PROCEDURE — 94660 CPAP INITIATION&MGMT: CPT

## 2022-02-16 PROCEDURE — 74011250636 HC RX REV CODE- 250/636: Performed by: INTERNAL MEDICINE

## 2022-02-16 PROCEDURE — 85025 COMPLETE CBC W/AUTO DIFF WBC: CPT

## 2022-02-16 PROCEDURE — 94762 N-INVAS EAR/PLS OXIMTRY CONT: CPT

## 2022-02-16 RX ORDER — CALCIUM ACETATE 667 MG/1
1 CAPSULE ORAL
Status: DISCONTINUED | OUTPATIENT
Start: 2022-02-16 | End: 2022-02-18 | Stop reason: HOSPADM

## 2022-02-16 RX ORDER — CARVEDILOL 6.25 MG/1
6.25 TABLET ORAL 2 TIMES DAILY WITH MEALS
Status: DISCONTINUED | OUTPATIENT
Start: 2022-02-16 | End: 2022-02-18 | Stop reason: HOSPADM

## 2022-02-16 RX ADMIN — HEPARIN SODIUM 5000 UNITS: 5000 INJECTION INTRAVENOUS; SUBCUTANEOUS at 15:15

## 2022-02-16 RX ADMIN — CALCIUM ACETATE 667 MG: 667 CAPSULE ORAL at 16:36

## 2022-02-16 RX ADMIN — HEPARIN SODIUM 5000 UNITS: 5000 INJECTION INTRAVENOUS; SUBCUTANEOUS at 20:51

## 2022-02-16 RX ADMIN — SODIUM CHLORIDE, PRESERVATIVE FREE 10 ML: 5 INJECTION INTRAVENOUS at 23:30

## 2022-02-16 RX ADMIN — EPOETIN ALFA-EPBX 4000 UNITS: 4000 INJECTION, SOLUTION INTRAVENOUS; SUBCUTANEOUS at 20:51

## 2022-02-16 RX ADMIN — ATOVAQUONE 750 MG: 750 SUSPENSION ORAL at 08:39

## 2022-02-16 RX ADMIN — SODIUM CHLORIDE, PRESERVATIVE FREE 10 ML: 5 INJECTION INTRAVENOUS at 16:26

## 2022-02-16 RX ADMIN — CARVEDILOL 6.25 MG: 6.25 TABLET, FILM COATED ORAL at 16:35

## 2022-02-16 RX ADMIN — ACETAMINOPHEN 650 MG: 325 TABLET ORAL at 16:36

## 2022-02-16 RX ADMIN — HEPARIN SODIUM 5000 UNITS: 5000 INJECTION INTRAVENOUS; SUBCUTANEOUS at 06:50

## 2022-02-16 RX ADMIN — DOXERCALCIFEROL 1 MCG: 4 INJECTION, SOLUTION INTRAVENOUS at 08:39

## 2022-02-16 NOTE — PROGRESS NOTES
Progress Note    Hailey Wheeler  63 y.o. Admit Date: 2/13/2022  Active Problems:    ESRD (end stage renal disease) on dialysis ([facility] (10/25/2021) POA: Yes      Volume overload (10/25/2021) POA: Yes      Hypertension (10/25/2021) POA: Yes      Respiratory failure ([facility] (10/25/2021) POA: Yes      Secondary hyperparathyroidism of renal origin ([facility] (2/14/2022) POA: Unknown            Subjective:     Patient is comfortable, lying Flat, . Still on Nitro dtrip. A comprehensive review of systems was negative except for that written in the History of Present Illness.     Objective:     Visit Vitals  BP (!) 147/90 (BP 1 Location: Right upper arm, BP Patient Position: At rest)   Pulse 91   Temp 98 °F (36.7 °C)   Resp 24   Ht 5' 1\" (1.549 m)   Wt 54.4 kg (120 lb)   SpO2 98%   BMI 22.67 kg/m²         Intake/Output Summary (Last 24 hours) at 2/16/2022 1033  Last data filed at 2/16/2022 0805  Gross per 24 hour   Intake 87.45 ml   Output 250 ml   Net -162.55 ml       Current Facility-Administered Medications   Medication Dose Route Frequency Provider Last Rate Last Admin    carvediloL (COREG) tablet 6.25 mg  6.25 mg Oral BID WITH MEALS Julián Byers MD        calcium acetate(phosphat bind) (PHOSLO) capsule 667 mg  1 Capsule Oral TID WITH MEALS Julián Byers MD        losartan (COZAAR) tablet 100 mg  100 mg Oral DAILY May Castillo N, DO        sodium chloride (NS) flush 5-40 mL  5-40 mL IntraVENous Q8H Demetrice [place], DO   10 mL at 02/15/22 1501    sodium chloride (NS) flush 5-40 mL  5-40 mL IntraVENous PRN Demetrice Galileo, DO        acetaminophen (TYLENOL) tablet 650 mg  650 mg Oral Q6H PRN Demetrice Galileo, DO   650 mg at 02/15/22 2032    Or    acetaminophen (TYLENOL) suppository 650 mg  650 mg Rectal Q6H PRN Demetrice Galileo, DO        polyethylene glycol (MIRALAX) packet 17 g  17 g Oral DAILY PRN Demetrice Gurrola DO        heparin (porcine) injection 5,000 Units  5,000 Units SubCUTAneous Q8H Kathy Tian S, DO   5,000 Units at 02/16/22 0650    promethazine (PHENERGAN) tablet 12.5 mg  12.5 mg Oral Q6H PRN Reatha Lopez, DO        Or    ondansetron [facility]) injection 4 mg  4 mg IntraVENous Q6H PRN Reatha Lopez, DO        doxercalciferoL (HECTOROL) 4 mcg/2 mL injection 1 mcg  1 mcg IntraVENous DIALYSIS MAGDY CONNOR & Nino Og MD   1 mcg at 02/16/22 0839    atovaquone (MEPRON) 750 mg/5 mL oral suspension 750 mg  750 mg Oral DAILY WITH BREAKFAST [place], [place], DO   750 mg at 02/16/22 8999    azithromycin (ZITHROMAX) tablet 1,200 mg  1,200 mg Oral Q7D Renetta [facility] [place], DO   1,200 mg at 02/14/22 1817    amLODIPine (NORVASC) tablet 10 mg  10 mg Oral DAILY Christal Tyson MD   10 mg at 02/15/22 0855    sodium chloride (NS) flush 5-40 mL  5-40 mL IntraVENous Q8H Reatha Lopez, DO   10 mL at 02/15/22 2347    sodium chloride (NS) flush 5-40 mL  5-40 mL IntraVENous PRN Reatha Lopez, DO            Physical Exam:     Physical Exam:   General:  Alert, cooperative, no distress, appears stated age. Neck: Supple, symmetrical, trachea midline, no adenopathy, thyroid: no enlargement/tenderness/nodules, no carotid bruit and no JVD. Lungs:   Clear to auscultation bilaterally. Heart:  Regular rate and rhythm, S1, S2 normal, no murmur, click, rub or gallop. Abdomen:   Soft, non-tender. Bowel sounds normal. No masses,  No organomegaly.    Extremities: Extremities normal, atraumatic, no cyanosis or edema, HD catheter is well dressed,AVG has good thrill & bruit   Skin: Skin color, texture, turgor normal. No rashes or lesions         Data Review:    CBC w/Diff    Recent Labs     02/16/22  0430 02/15/22  0426 02/15/22  0426 02/14/22  1520 02/14/22  1520 02/13/22  2115 02/13/22  2115   WBC 1.9*  --  2.2*  --  2.3*   < > 2.8*   RBC 3.18*  --  3.32*  --   --   --  3.76*   HGB 9.0*  --  9.3*  --  8.3*   < > 10.5*   HCT 29.4*  --  30.9*  --  25.2*   < > 35.6   MCV 92.5   < > 93.1   < > 88   < > 94.7   MCH 28.3   < > 28.0   < > 28.9   < > 27.9   MCHC 30.6*   < > 30.1*   < > 32.9   < > 29.5*   RDW 14.0   < > 14.1   < > 14.0   < > 14.5    < > = values in this interval not displayed. Recent Labs     02/16/22  0430 02/15/22  0426 02/15/22  0426 02/14/22  1520 02/14/22  1520   MONOS 18*  --  14*  --  7   EOS 6*  --  5  --  4   BASOS 1   < > 1   < > 1   RDW 14.0   < > 14.1   < > 14.0    < > = values in this interval not displayed. Comprehensive Metabolic Profile    Recent Labs     02/16/22  0430 02/15/22  0426 02/13/22  2115   * 136 142   K 4.5 4.3 3.5    100 107   CO2 25 29 29   BUN 40* 26* 44*   CREA 7.30* 5.28* 6.53*    Recent Labs     02/16/22  0430 02/15/22  0427 02/15/22  0426 02/13/22  2115 02/13/22  2115   CA 7.7* 7.9* 8.1*   < > 7.9*   PHOS 6.5*  --  5.1*  --   --    ALB  --   --   --   --  3.2*   TP  --   --   --   --  7.5   TBILI  --   --   --   --  0.4    < > = values in this interval not displayed. Hep core Antibody result is pending                Impression:       Active Hospital Problems    Diagnosis Date Noted    Secondary hyperparathyroidism of renal origin ([address] 02/14/2022    Volume overload 10/25/2021    Respiratory failure ([address] 10/25/2021    ESRD (end stage renal disease) on dialysis ([address] 10/25/2021    Hypertension 10/25/2021            Plan:     DC Nitro drip, Added coreg,also on Losartan & Norvasc, watch on K as she will be on Prophylactic Bactrim for PCJ . Will Dialyze her today on 6th floor.  Plan for arranging dialysis in [facility] unit in [place]

## 2022-02-16 NOTE — PROGRESS NOTES
[facility]  (A Division of [address] [place] [address])    Follow-up Note      Date of Admission: 2/13/2022       Date of Note:  2/16/2022          Current Antimicrobials:    Prior Antimicrobials:        Assessment:         Acute hypoxic respiratory failure: Initially requiring BiPAP. Most likely related to volume overload and hypertensive emergency chest x-ray with pulmonary edema. Cannot entirely rule out underlying PJP pneumonia. Cryptococcus Ag negative  HIV/AIDS: On tenofovir, Tivicay, Prezista/ritonavir, 3TC per documentation of prior to admission meds.; based on review of viral loads over the last 2 years it appears as though she become less compliant with her antiretroviral regimen. She is not sure who follows her locally for HIV but had moved from [facility] about 1 year ago. Previously on Biktarvy (bictegravir-emtricitibine-TNF)              -1/20/2022 HIV viral load 108,000, absolute CD4 16              -9/28/2020 genotype reviewed-no predicted integrates resistance   - 2/14 Cryptococcal Ag negative, RPR non-reactive, CD4 14  Chronic active hepatitis B: Last HBV PCR was 526 on 1/20/2022; hepatitis B E antigen negative on 3/29/2021   - 2/14 HBsAb +, HBsAg negative  End-stage renal disease on hemodialysis  History of syphilis: Last known RPR titer 1.8 about 1 years ago  Reported history of latent TB- quantiferron gold from 9/28/2020 negative  Hx substance abuse:  Prior documentation of cocaine use    Plan:   Discussed with Dr. Clarence Felix in HD    Continue antiretrovirals renally dosed-encourage patient compliance. HIV viral load, CMV PCR,HBV PCR, HCV PCR, fungitell pending. CBC, CMP in am  Sputum for routine and fungal cultures as well as silver stain to rule out PJP- ordered but not able to be done yet.      Continue atovaquone in setting of end-stage renal disease and need for PGP prophylaxis  Continue azithromycin for MAC prophylaxis given last known CD4 16     Can schedule as OP in my clinic to follow for HIV management. I left my card on her tray table, but I will also try to call her daughter    Discussed with Dr. Ayala Suggs. DO [facility]  [address], [address] [facility] [address]  Office: [phone]  Mobile/Text: [phone]     Microbiology:   respiratory viral panel: Negative for all pathogens tested    Lines / Catheters:  peripheral    Subjective:   Patient seen and examined at bedside. Seen in HD today. Much calmer today. No new issues reported per nursing staff. No fevers, chills, n/v/d. Denies any more SOB today. Objective:        Visit Vitals  BP (!) 154/85   Pulse (!) 104   Temp 98.3 °F (36.8 °C)   Resp 24   Ht 5' 1\" (1.549 m)   Wt 54.4 kg (120 lb)   SpO2 94%   BMI 22.67 kg/m²     Temp (24hrs), Av.1 °F (36.7 °C), Min:97.2 °F (36.2 °C), Max:99.1 °F (37.3 °C)        General:   awake alert and oriented, chronically ill-appearing   Skin:   no rashes or skin lesions noted on limited exam, dry and warm   HEENT:  No scleral icterus or pallor; oral mucosa moist, lips moist, poor dentition   Lymph Nodes:   not assessed today   Lungs:   non, labored; bilaterally clear to aspiration- no crackles wheezes rales or rhonchi   Heart:  RRR, s1 and s2; no murmurs rubs or gallops; no edema, + pedal pulses   Abdomen:  soft, non-distended, active bowel sounds, non-tender   Genitourinary:  deferred   Extremities:   average muscle tone; no contractures, no joint effusions   Neurologic:  No gross focal motor or sensory abnormalities; CN 2-12 intact; Follows commands. Psychiatric:   appropriate and interactive.          Lab results:    Chemistry  Recent Labs     22  0430 02/15/22  0427 02/15/22  04222   GLU 76  --  71*  --  127*   *  --  136  --  142   K 4.5  --  4.3  --  3.5     --  100  --  107   CO2 25  --  29  --  29   BUN 40*  --  26*  --  44*   CREA 7.30* --  5.28*  --  6.53*   CA 7.7* 7.9* 8.1*   < > 7.9*   AGAP 9  --  7  --  6   BUCR 5*  --  5*  --  7*   AP  --   --   --   --  98   TP  --   --   --   --  7.5   ALB  --   --   --   --  3.2*   GLOB  --   --   --   --  4.3*   AGRAT  --   --   --   --  0.7*    < > = values in this interval not displayed. CBC w/ Diff  Recent Labs     02/16/22  0430 02/15/22  0426 02/14/22  1520 02/13/22  2115 02/13/22 2115   WBC 1.9* 2.2* 2.3*   < > 2.8*   RBC 3.18* 3.32*  --   --  3.76*   HGB 9.0* 9.3* 8.3*   < > 10.5*   HCT 29.4* 30.9* 25.2*   < > 35.6    166 161   < > 188   GRANS 49 54 62   < > 51   LYMPH 26 25  --   --  34   EOS 6* 5 4   < > 3    < > = values in this interval not displayed.        Microbiology  All Micro Results     Procedure Component Value Units Date/Time    CRYPTOCOCCUS AG [facility] TITER [[id number]] Collected: 02/14/22 1819    Order Status: Completed Specimen: Serum from Blood Updated: 02/15/22 1032     Cryptococcus Ag Negative       CMV BY PCR, QT [[id number]] Collected: 02/14/22 1819    Order Status: Completed Specimen: Serum Updated: 02/14/22 1835    P.JIROVECI BY PCR [[id number]]     Order Status: Sent     RESPIRATORY VIRUS PANEL W/COVID-19, PCR [[id number]] Collected: 02/14/22 0127    Order Status: Completed Specimen: Nasopharyngeal Updated: 02/14/22 0248     Adenovirus Not detected        Coronavirus 229E Not detected        Coronavirus HKU1 Not detected        Coronavirus CVNL63 Not detected        Coronavirus OC43 Not detected        SARS-CoV-2, PCR Not detected        Metapneumovirus Not detected        Rhinovirus and Enterovirus Not detected        Influenza A Not detected        Influenza B Not detected        Parainfluenza 1 Not detected        Parainfluenza 2 Not detected        Parainfluenza 3 Not detected        Parainfluenza virus 4 Not detected        RSV by PCR Not detected        B. parapertussis, PCR Not detected        Bordetella pertussis - PCR Not detected        Chlamydophila pneumoniae DNA, QL, PCR Not detected        Mycoplasma pneumoniae DNA, QL, PCR Not detected              [address], DO   2/16/2022

## 2022-02-16 NOTE — PROGRESS NOTES
Found no order for Bipap for acute hypoxia diagnosis, last used 2/14/22. Pt said she was not using machine any longer and is on room air. Removed machine from room, will make available again if reordered. No resp distress noted Hr 109, Sats on room air 98% RR 18.

## 2022-02-16 NOTE — PROGRESS NOTES
Pt not seen for skilled PT due to:    []  Nausea/vomiting  []  Eating  []  Pain  []  Pt lethargic  [x]  Off Unit (dialysis)   Other:     Will f/u later as schedule allows. Thank you.   Haily Aquino, PT, DPT

## 2022-02-16 NOTE — PROGRESS NOTES
Progress Note    Patient: Billie Ayala MRN: [id number]  SSN: [id number]    YOB: 1960  Age: 63 y.o. Sex: female      Admit Date: 2/13/2022    LOS: 3 days     Subjective:     Patient seen in dialysis. Continues to look better each day. Objective:     Vitals:    02/16/22 1400 02/16/22 1419 02/16/22 1447 02/16/22 1635   BP: (!) 145/85 (!) 164/93 (!) 154/85 136/80   Pulse: (!) 101 86 (!) 104 100   Resp:   24 (!) 33   Temp:  97.5 °F (36.4 °C) 98.3 °F (36.8 °C) 98.2 °F (36.8 °C)   TempSrc:  Temporal     SpO2:   94% 95%   Weight:       Height:            Intake and Output:  Current Shift: 02/16 0701 - 02/16 1900  In: 309.8 [P.O.:240; I.V.:69.8]  Out: -   Last three shifts: 02/14 1901 - 02/16 0700  In: 74.4 [I.V.:74.4]  Out: 250 [Urine:250]    Physical Exam:   General:          Alert, cooperative, sitting comfortably on side of bed   Head:               Normocephalic, without obvious abnormality, atraumatic. Eyes:               Conjunctivae clear, anicteric sclerae. Pupils are equal  Neck:               Supple, symmetrical,  no adenopathy, thyroid: non tender                          no carotid bruit and no JVD. Back:               Symmetric,  No CVA tenderness. Lungs:             Lungs CTAB. On room air. Chest wall:      No tenderness or deformity. No Accessory muscle use. Heart:              Regular rate and rhythm,  no murmur, rub or gallop. Abdomen:        Soft, Not distended. Bowel sounds normal. No masses  Extremities:     Extremities normal, atraumatic, No cyanosis. No edema peripherally appreciated. No clubbing  Skin:                Texture, turgor normal.  Multiple hyper and hypopigmented skin lesions noted across arms and legs not Jaundiced  Lymph nodes: Cervical, supraclavicular normal.  Psych:             Good insight. Not depressed. Not anxious or agitated. Neurologic:      EOMs intact. No facial asymmetry. No aphasia or slurred speech.  Normal strength, Alert and oriented X 3. Lab/Data Review: All lab results for the last 24 hours reviewed. Assessment & Plan :     1) Acute hypoxic respiratory failure likely secondary to fluid overload, HTN emergency - now resolved. Patient initially required bipap. She is now on room air following dialysis. 2) HTN emergency, resolved. BP currently controlled on amlodipine, coreg, losartan. 3) HIV, Hep B, Hep C, TB, latent syphilis - ID following. Patient restarted on antivirals. Counts pending. On azithromycin, atovaquone. 4) ESRD on HD - nephro following  5) Hx of substance abuse - h/o cocaine use. Patient denies current use     DISPO: Patient will go home with home health. DME orders placed. [facility] orders.      Signed By: Krystin Donovan DO     February 16, 2022

## 2022-02-16 NOTE — DIALYSIS
ACUTE HEMODIALYSIS TREATMENT    HEMODIALYSIS ORDERS: Physician: Dr. Edd Walters: Saman   Duration: 3 hr   BFR: 350   DFR: 600   Dialysate:  Temp 36-37*C   K+  3    Ca+ 2.5   Na 138   Bicarb 35   Wt Readings from Last 1 Encounters:   02/14/22 54.4 kg (120 lb)    Patient Chart [x]   Unable to Obtain []  Dry weight/UF Goal: 2000 ml    Heparin []  Bolus    Units    [] Hourly    Units    [x]None       Pre BP:   151/95   Pulse:  88   Respirations: 18   Temp:  97.5  [] Oral [] Axillary [] Esophageal   Labs: []  Pre  []  Post:   [x] N/A   Additional Orders(medications, blood products, hypotension management): [] Yes   [] No     [x]  Hernandoita Consent Verified     CATHETER ACCESS:  []N/A   [x]Right   []Left   []IJ   []Fem  [x]Chest wall  []TransHepatic   [] First use X-ray verified     []Tunnel    [] Non Tunneled   [x]No S/S infection  []Redness  []Drainage []Cultured []Swelling []Pain   [x]Medical Aseptic Prep Utilized   []Dressing Changed  [x] Biopatch  Date:    []Clotted   [x]Patent   Flows: [x]Good  []Poor  []Reversed   If access problem,  notified: []Yes    [x]N/A          GENERAL ASSESSMENT:    LUNGS:  Resp Rate 18   [x] Clear  [] Coarse  [] Crackles  [] Wheezing  [] Diminished         Respirations:  [x]Easy  []Labored  []N/A  Cough:  []Productive  [x]Dry  []N/A      Therapy:  [x]RA  O2 Type:  []NC  Mask: []  NRB    [] BiPaP  Flow:  l/min                   [] Ventilated  [] Intubated  [] Trach     CARDIAC: [x] Regular      [] Irregular   [] Rhythm:          [] Monitored   [] Bedside   [] Remotely monitored     EDEMA: [] None   [x]Generalized  [] Pitting [] 1+   [] 2 +   [] 3+    [] 4+  [] Pedal    SKIN:   [x] Warm  [] Hot     [] Cold   [x] Dry     [] Pale   [] Diaphoretic                  [] Flushed  [] Jaundiced  [] Cyanotic     LOC:    [x] Alert      [x]Oriented:    [x] Person     [x] Place  []Time               [] Confused  [] Lethargic  [] Medicated  [] Non-responsive  [] Non Verbal   GI / ABDOMEN:                     [] Flat    [] Distended    [x] Soft    [] Firm   []  Obese                   [] Diarrhea   [] FMS [x] Bowel Sounds  [] Nausea  [] Vomiting                   [] NGT  [] OGT    [] PEG  [] Tube Feedings @     / URINE ASSESSMENT:                   [] Voiding  []  Aragon  [x] Oliguria  [] Anuria                     [] Incontinent  []  Incontinent Brief   []  PureWick     PAIN:  [x] 0 []1  []2   []3   []4   []5   []6   []7   []8   []9   []10                MOBILITY:  [x] Bed    [] Stretcher      All Vitals and Treatment Details on Attached [address]: [facility]          Room # 2307/01    [x] Routine         [] 1st Time Acute/Chronic   [] Urgent      [] Stat            [x] Acute Room   []  Bedside    [] ICU/CCU     [] ER   Isolation Precautions:  [x] Dialysis    There are currently no Active Isolations     ALLERGIES:     Allergies   Allergen Reactions    Ceftriaxone Itching     Rxn in ER recorded 2007      Aspirin Hives    Pcn [Penicillins] Hives      Code Status:  Full Code     Hepatitis Status      Lab Results   Component Value Date/Time    Hepatitis B surface Ag 0.15 02/14/2022 03:20 PM    Hepatitis B surface Ab 115.39 02/14/2022 03:20 PM    Hep B Core Ab, total Positive (A) 10/24/2021 08:00 PM        Current Labs:      Lab Results   Component Value Date/Time    WBC 1.9 (L) 02/16/2022 04:30 AM    HGB 9.0 (L) 02/16/2022 04:30 AM    HCT 29.4 (L) 02/16/2022 04:30 AM    PLATELET 082 24/92/0950 04:30 AM    MCV 92.5 02/16/2022 04:30 AM     Lab Results   Component Value Date/Time    Sodium 135 (L) 02/16/2022 04:30 AM    Potassium 4.5 02/16/2022 04:30 AM    Chloride 101 02/16/2022 04:30 AM    CO2 25 02/16/2022 04:30 AM    Anion gap 9 02/16/2022 04:30 AM    Glucose 76 02/16/2022 04:30 AM    BUN 40 (H) 02/16/2022 04:30 AM    Creatinine 7.30 (H) 02/16/2022 04:30 AM    BUN/Creatinine ratio 5 (L) 02/16/2022 04:30 AM    GFR est AA 7 (L) 02/16/2022 04:30 AM    GFR est non-AA 6 (L) 02/16/2022 04:30 AM    Calcium 7.7 (L) 02/16/2022 04:30 AM          DIET:  DIET ADULT     PRIMARY NURSE REPORT:   Pre Dialysis: Sidra Damian RN    Time: 200      EDUCATION:    [x] Patient           Knowledge Basis: [x]None []Minimal [] Substantial [] Unknown  Barriers to learning  []N/A  [] Intubated/Trached/Ventilated  [] Sedated/Paralyzed   [] Access Care     [] S&S of infection  [] Fluid Management  [] K+   [x] Procedural    [] Medications   [] Tx Options   [] Transplant   [] Diet      Teaching Tools:  [x] Explain  [] Demo  [] Handouts [] Video  Patient response: [] Verbalized understanding   [] Requires follow up        [x] Time Out/Safety Check    [x] Extracorporeal Circuit Tested for integrity       RO/HEMODIALYSIS MACHINE SAFETY CHECKS  Before each treatment:        [address] [place] [address]                                     [x] Unit Machine # 8 with centralized RO                                    [] Portable Machine #1/RO serial # [id number]                                  [] Portable Machine #2/RO serial # [id number]                                  [] Portable Machine #4/RO serial # [id number]                                  [] Portable Machine #10/RO serial #  [id number]                                                                                                         Alarm Test:  Pass time 0920            [x] RO/Machine Log Complete    Machine Temp    36-37*C             Dialysate: pH 7.4    Conductivity: Meter 14   HD Machine  13.8     TCD: 14  Dialyzer Lot # [id number]     Blood Tubing Lot # [id number]     Saline Lot # [id number]     CHLORINE TESTING-Before each treatment and every 4 hours    Total Chlorine: [x] less than 0.1 ppm  Initial Time Check: 0900       4 Hr/2nd Check Time: 1300   (if greater than 0.1 ppm from Primary then every 30 minutes from Secondary)     TREATMENT INITIATION  with Dialysis Precautions:   [x] All Connections Secured              [x] Saline Line Double Clamped   [x] Venous Parameters Set [x] Arterial Parameters Set    [x] Prime Given 250ml NSS              [x]Air Foam Detector Engaged      Treatment Initiation Note:    Patient arrived the unit, assessed and stable for treatment [facility] Accessed with no signs or symptoms of complication. Treatment initiated      During Treatment Notes:  6300  Vascular access visible with arterial and venous line connections intact. Pt resting comfortably. 1130  Vascular access visible with arterial and venous line connections intact. Pt resting comfortably. 1145  Vascular access visible with arterial and venous line connections intact. Pt resting comfortably. 1200  Vascular access visible with arterial and venous line connections intact. Pt resting comfortably. 1215  Vascular access visible with arterial and venous line connections intact. Pt resting comfortably. 1245  Vascular access visible with arterial and venous line connections intact. Pt resting comfortably. 1300  Vascular access visible with arterial and venous line connections intact. Pt resting comfortably. 1330  Vascular access visible with arterial and venous line connections intact. Pt resting comfortably. 1345  Vascular access visible with arterial and venous line connections intact. Pt resting comfortably. 1400  Vascular access visible with arterial and venous line connections intact. Pt resting comfortably. 1415  Dialysis treatment complete. Medication Dose Volume Route Time [facility] Nurse, Title      PITA Johnson, RN      PITA Johnson, RN      PITA Johnson RN     Post Assessment  Dialyzer Cleared:   [] Good  [] Fair  [] Poor  Blood processed:  2000 L  UF Removed:    Ml    Post /93   Pulse  86 Resp  20  Temp 97.5 Lungs: [x] Clear [] Course  [] Crackles                 []  Wheezing   [] Diminished   Post Tx Vascular Access: [] N/A Cardiac :[x] Regular   [] Irregular   Rhythm:  [x] Monitored   [] Not Monitored    CVC Catheter: [] N/A  Locking solution: Heparin 1:1000 U  Arterial port 1.9 ml   Venous port 2.0 ml   Edema:  [] None  [] Generalized                     Skin:[x] Warm  [x] Dry [] Diaphoretic               [] Flushed  [] Pale [] Cyanotic Pain:  [x]0  []1 []2  []3 []4  []5  []6  []7 []8    []9  []10     Post Treatment Note:    Patient completed and  Tolerated 3 hrs of dialysis.       POST TREATMENT PRIMARY NURSE HANDOFF REPORT:   Post Dialysis: Yakelin Pacheco RN               Time:  0       Abbreviations: AVG-arterial venous graft, AVF-arterial venous fistula, IJ-Internal Jugular, Subcl-Subclavian, Fem-Femoral, Tx-treatment, AP/HR-apical heart rate, VSS- Vital Signs Stable, CVC- Central Venous Catheter, DFR-dialysate flow rate, BFR-blood flow rate, AP-arterial pressure, -venous pressure, UF-ultrafiltrate, TMP-transmembrane pressure, Vic-Venous, Art-Arterial, RO-Reverse Osmosis

## 2022-02-17 LAB
CMV DNA SERPL NAA+PROBE-ACNC: NEGATIVE IU/ML
CMV DNA SERPL NAA+PROBE-LOG IU: NORMAL LOG10 IU/ML
HBV CORE AB SERPL QL IA: POSITIVE
HCV GENOTYPE: NORMAL
HCV RNA SERPL NAA+PROBE-ACNC: NORMAL IU/ML
HCV RNA SERPL NAA+PROBE-LOG IU: NORMAL LOG10 IU/ML
TEST INFORMATION, 550045: NORMAL

## 2022-02-17 PROCEDURE — 97116 GAIT TRAINING THERAPY: CPT

## 2022-02-17 PROCEDURE — 74011000250 HC RX REV CODE- 250: Performed by: INTERNAL MEDICINE

## 2022-02-17 PROCEDURE — 65660000001 HC RM ICU INTERMED STEPDOWN

## 2022-02-17 PROCEDURE — 74011250637 HC RX REV CODE- 250/637: Performed by: STUDENT IN AN ORGANIZED HEALTH CARE EDUCATION/TRAINING PROGRAM

## 2022-02-17 PROCEDURE — 36415 COLL VENOUS BLD VENIPUNCTURE: CPT

## 2022-02-17 PROCEDURE — 74011250637 HC RX REV CODE- 250/637: Performed by: INTERNAL MEDICINE

## 2022-02-17 PROCEDURE — 97535 SELF CARE MNGMENT TRAINING: CPT

## 2022-02-17 PROCEDURE — 2709999900 HC NON-CHARGEABLE SUPPLY

## 2022-02-17 PROCEDURE — 74011250636 HC RX REV CODE- 250/636: Performed by: INTERNAL MEDICINE

## 2022-02-17 PROCEDURE — 86704 HEP B CORE ANTIBODY TOTAL: CPT

## 2022-02-17 PROCEDURE — 65660000004 HC RM CVT STEPDOWN

## 2022-02-17 PROCEDURE — 99233 SBSQ HOSP IP/OBS HIGH 50: CPT | Performed by: STUDENT IN AN ORGANIZED HEALTH CARE EDUCATION/TRAINING PROGRAM

## 2022-02-17 RX ORDER — DOXERCALCIFEROL 4 UG/2ML
3 INJECTION INTRAVENOUS
Status: DISCONTINUED | OUTPATIENT
Start: 2022-02-18 | End: 2022-02-18 | Stop reason: HOSPADM

## 2022-02-17 RX ADMIN — SODIUM CHLORIDE, PRESERVATIVE FREE 10 ML: 5 INJECTION INTRAVENOUS at 13:24

## 2022-02-17 RX ADMIN — CALCIUM ACETATE 667 MG: 667 CAPSULE ORAL at 08:20

## 2022-02-17 RX ADMIN — ACETAMINOPHEN 650 MG: 325 TABLET ORAL at 06:26

## 2022-02-17 RX ADMIN — CALCIUM ACETATE 667 MG: 667 CAPSULE ORAL at 16:07

## 2022-02-17 RX ADMIN — ATOVAQUONE 750 MG: 750 SUSPENSION ORAL at 08:20

## 2022-02-17 RX ADMIN — HEPARIN SODIUM 5000 UNITS: 5000 INJECTION INTRAVENOUS; SUBCUTANEOUS at 06:35

## 2022-02-17 RX ADMIN — SODIUM CHLORIDE, PRESERVATIVE FREE 10 ML: 5 INJECTION INTRAVENOUS at 21:39

## 2022-02-17 RX ADMIN — CARVEDILOL 6.25 MG: 6.25 TABLET, FILM COATED ORAL at 16:07

## 2022-02-17 RX ADMIN — CARVEDILOL 6.25 MG: 6.25 TABLET, FILM COATED ORAL at 08:20

## 2022-02-17 RX ADMIN — HEPARIN SODIUM 5000 UNITS: 5000 INJECTION INTRAVENOUS; SUBCUTANEOUS at 13:23

## 2022-02-17 RX ADMIN — AMLODIPINE BESYLATE 10 MG: 10 TABLET ORAL at 08:20

## 2022-02-17 RX ADMIN — CALCIUM ACETATE 667 MG: 667 CAPSULE ORAL at 11:17

## 2022-02-17 RX ADMIN — ACETAMINOPHEN 650 MG: 325 TABLET ORAL at 22:22

## 2022-02-17 RX ADMIN — LOSARTAN POTASSIUM 100 MG: 50 TABLET, FILM COATED ORAL at 08:20

## 2022-02-17 RX ADMIN — SODIUM CHLORIDE, PRESERVATIVE FREE 10 ML: 5 INJECTION INTRAVENOUS at 21:38

## 2022-02-17 NOTE — PROGRESS NOTES
Problem: Mobility Impaired (Adult and Pediatric)  Goal: *Acute Goals and Plan of Care (Insert Text)  Description: Physical Therapy Goals  Initiated 2/15/2022 and to be accomplished within 7 day(s)  1. Patient will move from supine to sit and sit to supine , scoot up and down, and roll side to side in bed with modified independence. 2.  Patient will transfer from bed to chair and chair to bed with modified independence using the least restrictive device. 3.  Patient will perform sit to stand with modified independence. 4.  Patient will ambulate with modified independence for 100 feet with the least restrictive device. 5.  Patient will ascend/descend 4 stairs with 1 handrail(s) with minimal assistance/contact guard assist.  6.  Patient will perform BLE therex as prescribed to tolerance to BlE    PLOF: pt was sup/mod ind level, lives with daughter in [address] [place] [address] with 3-4 FILEMON, no DME     Outcome: Progressing Towards Goal     PHYSICAL THERAPY TREATMENT    Patient: Yoon Souza (24 y.o. female)  Date: 2/17/2022  Diagnosis: Volume overload [E87.70]  Respiratory failure ([address] [J96.90] <principal problem not specified>       Precautions: Fall    ASSESSMENT:  Pt cleared to participate in PT session, pt received sitting EOB and agreeable to therapy session. Pt standing with CGA. Pt ambulating x50 feet with handhold, pt adamantly declining RW this session. Pt reports she does have a walker at home but only uses it when she drinks alcohol to help with her balance. Pt demonstrating shortened step lengths, narrow base of support and increased trunk and head sway. Pt keeping LUE in elbow flexion and sh IR close to trunk. Pt reporting this is a normal hand positioning. Pt returned to sitting EOB and declined further mobility. Pt positioned for comfort and educated to call for assist before getting up, pt verbalized understanding. Pt left with all needs met and call bell in reach. RN notified of position and participation. Progression toward goals:   []      Improving appropriately and progressing toward goals  [x]      Improving slowly and progressing toward goals  []      Not making progress toward goals and plan of care will be adjusted     PLAN:  Patient continues to benefit from skilled intervention to address the above impairments. Continue treatment per established plan of care. Discharge Recommendations:  Home Health with 24/7 assist/supervision at home from family for safety, if unable to provide 24/7 may need rehab. Pt reporting she falls \"all the time\" at home     Further Equipment Recommendations for Discharge:  rolling walker     SUBJECTIVE:   Patient stated I walked a long way.     OBJECTIVE DATA SUMMARY:   Critical Behavior:  Neurologic State: Alert  Orientation Level: Oriented X4  Cognition: Follows commands  Safety/Judgement: Fall prevention  Functional Mobility Training:  Bed Mobility:     Supine to Sit: Supervision  Sit to Supine: Supervision  Scooting: Supervision    Transfers:  Sit to Stand: Contact guard assistance  Stand to Sit: Contact guard assistance    Balance:  Sitting: Intact  Standing: Impaired; With support  Standing - Static: Fair  Standing - Dynamic : Fair     Ambulation/Gait Training:  Distance (ft): 50 Feet (ft)  Assistive Device:  (handhold )  Ambulation - Level of Assistance: Minimal assistance    Gait Abnormalities: Decreased step clearance;Trunk sway increased (head sway)    Base of Support: Narrowed; Center of gravity altered     Speed/Karen: Slow;Shuffled  Step Length: Right shortened;Left shortened    Pain:  Pain level pre-treatment: 0/10  Pain level post-treatment: 0/10     Activity Tolerance:   Improving     Please refer to the flowsheet for vital signs taken during this treatment.   After treatment:   [] Patient left in no apparent distress sitting up in chair  [x] Patient left in no apparent distress in bed  [x] Call bell left within reach  [x] Nursing notified  [] Caregiver present  [] Bed alarm activated  [] SCDs applied      COMMUNICATION/EDUCATION:   [x]         Role of Physical Therapy in the acute care setting. [x]         Fall prevention education was provided and the patient/caregiver indicated understanding. [x]         Patient/family have participated as able in working toward goals and plan of care. [x]         Patient/family agree to work toward stated goals and plan of care. []         Patient understands intent and goals of therapy, but is neutral about his/her participation.   []         Patient is unable to participate in stated goals/plan of care: ongoing with therapy staff.  []         Other:        Agustín Vieira, PT   Time Calculation: 8 mins

## 2022-02-17 NOTE — PROGRESS NOTES
Home health orders noted and sent to [address] [place] [address] in [place] [address]. Order for transfer bench and bedside commode faxed to [address] [place] [address]. Pt has a rolling walker at home. 1037: [facility] accepted pt.       Figueroa Hameed BSN RN  Care Management  Pager: [phone]

## 2022-02-17 NOTE — PROGRESS NOTES
[facility]  (A Division of [address] [place] [address])    Follow-up Note      Date of Admission: 2/13/2022       Date of Note:  2/17/2022          Current Antimicrobials:    Prior Antimicrobials:        Assessment:         Acute hypoxic respiratory failure: Initially requiring BiPAP. Most likely related to volume overload and hypertensive emergency chest x-ray with pulmonary edema. Cannot entirely rule out underlying PJP pneumonia. Cryptococcus Ag negative, CMV PCR negative  HIV/AIDS: On tenofovir, Tivicay, Prezista/ritonavir, 3TC per documentation of prior to admission meds.; based on review of viral loads over the last 2 years it appears as though she become less compliant with her antiretroviral regimen. She is not sure who follows her locally for HIV but had moved from [place], [place] about 1 year ago. Previously on Biktarvy (bictegravir-emtricitibine-TNF)              -1/20/2022 HIV viral load 108,000, absolute CD4 16              -9/28/2020 genotype reviewed-no predicted integrates resistance   - 2/14 Cryptococcal Ag negative, RPR non-reactive, CD4 14, , ,000  Chronic active hepatitis B: Last HBV PCR was 526 on 1/20/2022; hepatitis B E antigen negative on 3/29/2021   - Viral load 380 (2/14/22)   - 2/14 HBsAb +, HBsAg negative, + HBcAb   - HCV PCR negative  End-stage renal disease on hemodialysis  History of syphilis: Last known RPR titer 1.8 about 1 years ago  Reported history of latent TB- quantiferron gold from 9/28/2020 negative  Hx substance abuse:  Prior documentation of cocaine use    Plan:   Discussed with Dr. Clarence Felix  Discussed with Dr. Savita Castrejon    Continue antiretrovirals renally dosed-encourage patient compliance. fungitell pending. CBC, CMP in am  Sputum for routine and fungal cultures as well as silver stain to rule out PJP- ordered but not able to be done yet.      Continue atovaquone in setting of end-stage renal disease and need for PGP prophylaxis- will increase to 1500mg dose since she is tolerating  Continue azithromycin for MAC prophylaxis given CD4 14     Can schedule as OP in my clinic to follow for HIV management. I left my card on her tray table if she and her daughter decide to transition care to the [place] area for ease of transportation. Encourage compliance with HAART and prophylaxis. DO [facility]  [address], [address] [facility] [place], [address]  Office: [phone]  Mobile/Text: [phone]     Microbiology:   respiratory viral panel: Negative for all pathogens tested    Lines / Catheters:  peripheral    Subjective:   Patient seen and examined at bedside. Seen in HD today. Much calmer today. No new issues reported per nursing staff. No fevers, chills, n/v/d. Denies any more SOB today. Objective:        Visit Vitals  /85 (BP 1 Location: Right upper arm, BP Patient Position: Sitting)   Pulse 98   Temp 97.3 °F (36.3 °C)   Resp 22   Ht 5' 1\" (1.549 m)   Wt 50.7 kg (111 lb 11.2 oz)   SpO2 98%   BMI 21.11 kg/m²     Temp (24hrs), Av.5 °F (36.9 °C), Min:97.3 °F (36.3 °C), Max:99.1 °F (37.3 °C)        General:   awake alert and oriented, chronically ill-appearing   Skin:   no rashes or skin lesions noted on limited exam, dry and warm   HEENT:  No scleral icterus or pallor; oral mucosa moist, lips moist, poor dentition   Lymph Nodes:   not assessed today   Lungs:   non, labored; bilaterally clear to aspiration- no crackles wheezes rales or rhonchi   Heart:  RRR, s1 and s2; no murmurs rubs or gallops; no edema, + pedal pulses   Abdomen:  soft, non-distended, active bowel sounds, non-tender   Genitourinary:  deferred   Extremities:   average muscle tone; no contractures, no joint effusions   Neurologic:  No gross focal motor or sensory abnormalities; CN 2-12 intact; Follows commands. Psychiatric:   appropriate and interactive.          Lab results:    Chemistry  Recent Labs     02/16/22  0430 02/15/22  0427 02/15/22  0426   GLU 76  --  71*   *  --  136   K 4.5  --  4.3     --  100   CO2 25  --  29   BUN 40*  --  26*   CREA 7.30*  --  5.28*   CA 7.7* 7.9* 8.1*   AGAP 9  --  7   BUCR 5*  --  5*       CBC w/ Diff  Recent Labs     02/16/22  0430 02/15/22  0426 02/14/22  1520   WBC 1.9* 2.2* 2.3*   RBC 3.18* 3.32*  --    HGB 9.0* 9.3* 8.3*   HCT 29.4* 30.9* 25.2*    166 161   GRANS 49 54 62   LYMPH 26 25  --    EOS 6* 5 4       Microbiology  All Micro Results     Procedure Component Value Units Date/Time    CMV BY PCR, QT [[id number]] Collected: 02/14/22 1819    Order Status: Completed Specimen: Serum from Plasma Updated: 02/17/22 1437     CMV IU/mL Negative IU/mL      Comment: (NOTE)  No CMV DNA detected. The quantitative range of this assay is 200 to 1 million IU/mL.   Performed At: [facility] & [address] [id number]  Franco Camejo MD RV:[id number]          CMV log 10 IU/mL TEST NOT PERFORMED log10 IU/mL      Comment: (NOTE)  Unable to calculate result since non-numeric result obtained for  component test.         CRYPTOCOCCUS AG W/REFLEX TITER [[id number]] Collected: 02/14/22 1819    Order Status: Completed Specimen: Serum from Blood Updated: 02/15/22 1032     Cryptococcus Ag Negative       P.JIROVECI BY PCR [[id number]] Collected: 02/14/22 1500    Order Status: Canceled     RESPIRATORY VIRUS PANEL W/COVID-19, PCR [[id number]] Collected: 02/14/22 0127    Order Status: Completed Specimen: Nasopharyngeal Updated: 02/14/22 0248     Adenovirus Not detected        Coronavirus 229E Not detected        Coronavirus HKU1 Not detected        Coronavirus CVNL63 Not detected        Coronavirus OC43 Not detected        SARS-CoV-2, PCR Not detected        Metapneumovirus Not detected        Rhinovirus and Enterovirus Not detected        Influenza A Not detected        Influenza B Not detected        Parainfluenza 1 Not detected        Parainfluenza 2 Not detected        Parainfluenza 3 Not detected        Parainfluenza virus 4 Not detected        RSV by PCR Not detected        B. parapertussis, PCR Not detected        Bordetella pertussis - PCR Not detected        Chlamydophila pneumoniae DNA, QL, PCR Not detected        Mycoplasma pneumoniae DNA, QL, PCR Not detected              Woo Mack DO   2/17/2022

## 2022-02-17 NOTE — PROGRESS NOTES
Physician Progress Note      Loretta Resendiz  CSN #:                  [id number]  :                       1960  ADMIT DATE:       2022 8:59 PM  DISCH DATE:  RESPONDING  PROVIDER #:        Hilary Rosado DO          QUERY TEXT:    Dear hospitalist  Pt admitted with acute resp failure . Pt noted to have HIV/AIDS. If possible, please clarify in progress notes and discharge summary the patient?s HIV status as: The medical record reflects the following:  Risk Factors: documented  h/o  HIV/ AIDS  based on review of viral loads over the last 2 years it appears as though she become less compliant with her antiretroviral regimen  Clinical Indicators: per  ID- Acute hypoxic respiratory failure: Most likely related to volume overload and hypertensive emergency chest x-ray with pulmonary edema. Cannot entirely rule out underlying PJP pneumonia. Cryptococcus Ag negative  Treatment: - Continue antiretrovirals renally dosed-encourage patient compliance  Continue atovaquone in setting of end-stage renal disease and need for PGP prophylaxis  Continue azithromycin for MAC prophylaxis given last known CD4 16    Thnak you,    Bessie Ohara RN  CCDS  [email or web address]  Options provided:  -- Asymptomatic HIV  -- HIV disease/AIDs with PMH of HIV related illness or opportunistic infection  -- HIV disease/AIDS with current HIV related illness PJP pneumonia  -- Other - I will add my own diagnosis  -- Disagree - Not applicable / Not valid  -- Disagree - Clinically unable to determine / Unknown  -- Refer to Clinical Documentation Reviewer    PROVIDER RESPONSE TEXT:    This patient has HIV disease/AIDS with PMH of HIV related illness or opportunistic infection.     Query created by: Chuck Kaufman on 2022 9:32 AM      Electronically signed by:  Hilary Rosado DO 2022 10:47 AM

## 2022-02-17 NOTE — PROGRESS NOTES
Problem: Self Care Deficits Care Plan (Adult)  Goal: *Acute Goals and Plan of Care (Insert Text)  Description: Occupational Therapy Goals  Initiated 2/15/2022 within 7 day(s). 1.  Patient will perform grooming in stance at sink level with supervision assist and F+ balance. 2.  Patient will perform upper body bathing and upper body dressing with modified independence. 3.  Patient will perform bathing and lower body dressing with modified independence. 4.  Patient will perform toilet transfers with supervision/set-up. 5.  Patient will perform all aspects of toileting with modified independence. 6.  Patient will participate in upper extremity therapeutic exercise/activities with supervision/set-up for 5-7 minutes. 7.  Patient will utilize energy conservation techniques during functional activities with verbal cues. Prior Level of Function: Pt reports performing all ADLs with MI and having difficulty stepping into tub/shower at home. Outcome: Progressing Towards Goal   OCCUPATIONAL THERAPY TREATMENT    Patient: Andres Garcia (2 y.o. female)  Date: 2/17/2022  Diagnosis: Volume overload [E87.70]  Respiratory failure ([address] [J96.90] <principal problem not specified>       Precautions: Fall  PLOF: Pt reports performing all ADLs with MI and having difficulty stepping into tub/shower at home. Chart, occupational therapy assessment, plan of care, and goals were reviewed. ASSESSMENT:  Nursing/RN cleared for pt to participate in OT tx session. Pt agreeable to participate in ADL routine seated on edge of bed & in stance w/ RW. Following ADL routine, functional mobility using RW for simulated bedside commode transfer, CGA with noted ataxia, from left side of bed around foot of bed to right side of bed. Nursing notified of pt's c/o 4/10 LUE pain, following HD per nursing.  Call bell within reach & pt verbalized understanding and provided return demonstration to utilize for assist e.g. functional transfers in order to prevent falls. Progression toward goals:  []          Improving appropriately and progressing toward goals  [x]          Improving slowly and progressing toward goals  []          Not making progress toward goals and plan of care will be adjusted     PLAN:  Patient continues to benefit from skilled intervention to address the above impairments. Continue treatment per established plan of care. Discharge Recommendations:  Home Health, 24/7 supv with increased assist from family e.g. ADLs  Further Equipment Recommendations for Discharge:  bedside commode and rolling walker     SUBJECTIVE:   Patient stated My left arm hurts.     OBJECTIVE DATA SUMMARY:   Cognitive/Behavioral Status:  Neurologic State: Alert  Orientation Level: Oriented X4  Cognition: Follows commands  Safety/Judgement: Fall prevention    Functional Mobility and Transfers for ADLs:   Bed Mobility:     Supine to Sit: Contact guard assistance  Sit to Supine: Contact guard assistance  Scooting: Contact guard assistance   Transfers:  Sit to Stand: Contact guard assistance  Stand to Sit: Contact guard assistance    Balance:  Sitting: Intact  Standing: Impaired; With support  Standing - Static: Fair  Standing - Dynamic : Fair (-)    ADL Intervention:       Grooming  Position Performed: Seated edge of bed  Washing Face: Stand-by assistance    Upper Body Bathing  Bathing Assistance: Stand-by assistance  Position Performed: Seated edge of bed    Lower Body Bathing  Perineal  : Contact guard assistance  Position Performed: Standing (w/ RW)    Upper Body [address] Gown: Stand-by assistance    Lower Body Dressing Assistance  Socks: Stand-by assistance  Position Performed: Seated edge of bed    Cognitive Retraining  Safety/Judgement: Fall prevention    Pain:  Pain level pre-treatment: 4/10 (L)UE   Pain level post-treatment: 4/10 (L)UE  Pain Intervention(s): Medication (see MAR);  Rest, Repositioning   Response to intervention: Nurse notified, See doc flow    Activity Tolerance:    fair  Please refer to the flowsheet for vital signs taken during this treatment. After treatment:   []  Patient left in no apparent distress sitting up in chair  [x]  Patient left in no apparent distress in bed  [x]  Call bell left within reach  [x]  Nursing notified  []  Caregiver present  [x]  Bed alarm activated    COMMUNICATION/EDUCATION:   [x] Role of Occupational Therapy in the acute care setting  [x] Home safety education was provided and the patient/caregiver indicated understanding. [x] Patient/family have participated as able in working towards goals and plan of care. [x] Patient/family agree to work toward stated goals and plan of care. [] Patient understands intent and goals of therapy, but is neutral about his/her participation. [] Patient is unable to participate in goal setting and plan of care.       Thank you for this referral.  Himanshu Arzate  Time Calculation: 16 mins

## 2022-02-17 NOTE — PROGRESS NOTES
Progress Note    Patient: Slade Rosales MRN: [id number]  SSN: [id number]    YOB: 1960  Age: 63 y.o. Sex: female      Admit Date: 2/13/2022    LOS: 4 days     Subjective:     Patient seen in room today. No acute concerns. Feels better today than yesterday. Objective:     Vitals:    02/17/22 0421 02/17/22 0757 02/17/22 1143 02/17/22 1145   BP: (!) 138/92 125/87 133/85    Pulse: 92 85  98   Resp: 21 22 22   Temp: 99 °F (37.2 °C) 98.6 °F (37 °C)  97.3 °F (36.3 °C)   TempSrc:       SpO2: 98% 98%  98%   Weight: 50.7 kg (111 lb 11.2 oz)      Height:            Intake and Output:  Current Shift: No intake/output data recorded. Last three shifts: 02/15 1901 - 02/17 0700  In: 549.8 [P.O.:480; I.V.:69.8]  Out: 350 [Urine:350]    Physical Exam:   General:          Alert, cooperative, lying in bed. Head:               Normocephalic, without obvious abnormality, atraumatic. Eyes:               Conjunctivae clear, anicteric sclerae. Pupils are equal  Neck:               Supple, symmetrical,  no adenopathy, thyroid: non tender                          no carotid bruit and no JVD. Back:               Symmetric,  No CVA tenderness. Lungs:             Lungs CTAB. On room air. Chest wall:      No tenderness or deformity. No Accessory muscle use. Heart:              Regular rate and rhythm,  no murmur, rub or gallop. Abdomen:        Soft, Not distended. Bowel sounds normal. No masses  Extremities:     Extremities normal, atraumatic, No cyanosis. No edema peripherally appreciated. No clubbing  Skin:                Texture, turgor normal.  Multiple hyper and hypopigmented skin lesions noted across arms and legs not Jaundiced  Lymph nodes: Cervical, supraclavicular normal.  Psych:             Good insight. Not depressed. Not anxious or agitated. Neurologic:      EOMs intact. No facial asymmetry. No aphasia or slurred speech. Normal strength, Alert and oriented X 3. Lab/Data Review:   All lab results for the last 24 hours reviewed. Assessment & Plan :     1) Acute hypoxic respiratory failure likely secondary to fluid overload, HTN emergency - now resolved. Patient initially required bipap. She is now on room air following dialysis. 2) HTN emergency, resolved. BP currently controlled on amlodipine, coreg, losartan. 3) HIV, Hep B, Hep C, TB, latent syphilis - ID following. Patient restarted on antivirals. Counts pending. On azithromycin, atovaquone. Has OP follow up arranged. 4) ESRD on HD - nephro following  5) Hx of substance abuse - h/o cocaine use. Patient denies current use. DISPO: Patient will go home with home health. [facility]. Patient to be discharged tomorrow morning.      Signed By: Claudene Dauphin, DO     February 17, 2022

## 2022-02-17 NOTE — PROGRESS NOTES
Progress Note    Ajay Young  63 y.o. Admit Date: 2/13/2022  Active Problems:    ESRD (end stage renal disease) on dialysis ([facility] (10/25/2021) POA: Yes      Volume overload (10/25/2021) POA: Yes      Hypertension (10/25/2021) POA: Yes      Respiratory failure ([facility] (10/25/2021) POA: Yes      AIDS (acquired immunodeficiency syndrome), CD4 <=200 ([facility] (11/8/2021) POA: Unknown      Secondary hyperparathyroidism of renal origin ([facility] (2/14/2022) POA: Unknown            Subjective:     Patient feels better than last few days, By CD 4 count has AIDS,also has Hep B Core Anti Body Positive, will need Ig M core antibody. ID Input is appreciated. Plan for  DC with home health on tomorrow noted  . A comprehensive review of systems was negative except for that written in the History of Present Illness.     Objective:     Visit Vitals  BP (!) 147/94 (BP 1 Location: Right upper arm, BP Patient Position: Sitting)   Pulse 94   Temp 98.9 °F (37.2 °C)   Resp 20   Ht 5' 1\" (1.549 m)   Wt 50.7 kg (111 lb 11.2 oz)   SpO2 98%   BMI 21.11 kg/m²         Intake/Output Summary (Last 24 hours) at 2/17/2022 1848  Last data filed at 2/17/2022 1645  Gross per 24 hour   Intake    Output 150 ml   Net -150 ml       Current Facility-Administered Medications   Medication Dose Route Frequency Provider Last Rate Last Admin    [START ON 2/18/2022] doxercalciferoL (HECTOROL) 4 mcg/2 mL injection 3 mcg  3 mcg IntraVENous DIALYSIS MON, WED & Ania Lakhani, Mary Sepulveda MD        carvediloL (COREG) tablet 6.25 mg  6.25 mg Oral BID WITH MEALS Jodie Gruber MD   6.25 mg at 02/17/22 1607    calcium acetate(phosphat bind) (PHOSLO) capsule 667 mg  1 Capsule Oral TID WITH MEALS Jodie Gruber MD   667 mg at 02/17/22 1607    epoetin romelia-epbx (RETACRIT) injection 4,000 Units  4,000 Units SubCUTAneous Q MON, WED & Mckinley French MD   4,000 Units at 02/16/22 2051    losartan (COZAAR) tablet 100 mg  100 mg Oral DAILY May Castillo DO   100 mg at 02/17/22 0820    sodium chloride (NS) flush 5-40 mL  5-40 mL IntraVENous Q8H Genet Flock, DO   10 mL at 02/17/22 1324    sodium chloride (NS) flush 5-40 mL  5-40 mL IntraVENous PRN Genet Flock, DO        acetaminophen (TYLENOL) tablet 650 mg  650 mg Oral Q6H PRN Genet Flock, DO   650 mg at 02/17/22 9737    Or    acetaminophen (TYLENOL) suppository 650 mg  650 mg Rectal Q6H PRN Genet Flock, DO        polyethylene glycol (MIRALAX) packet 17 g  17 g Oral DAILY PRN Genet Flock, DO        heparin (porcine) injection 5,000 Units  5,000 Units SubCUTAneous Q8H Genet Flock, DO   5,000 Units at 02/17/22 1323    promethazine (PHENERGAN) tablet 12.5 mg  12.5 mg Oral Q6H PRN Genet Flock, DO        Or    ondansetron [facility]) injection 4 mg  4 mg IntraVENous Q6H PRN Genet Flock, DO        atovaquone (MEPRON) 750 mg/5 mL oral suspension 750 mg  750 mg Oral DAILY WITH BREAKFAST [place], [place], DO   750 mg at 02/17/22 0820    azithromycin (ZITHROMAX) tablet 1,200 mg  1,200 mg Oral Q7D [facility], DO   1,200 mg at 02/14/22 1817    amLODIPine (NORVASC) tablet 10 mg  10 mg Oral DAILY Annmarie Quiles MD   10 mg at 02/17/22 0820    sodium chloride (NS) flush 5-40 mL  5-40 mL IntraVENous Q8H Genet Flock, DO   10 mL at 02/17/22 1324    sodium chloride (NS) flush 5-40 mL  5-40 mL IntraVENous PRN Genet Flock, DO            Physical Exam:     Physical Exam:   General:  Alert, cooperative, no distress, appears stated age. Neck: Supple, symmetrical, trachea midline, no adenopathy, thyroid: no enlargement/tenderness/nodules, no carotid bruit and no JVD. Lungs:   Clear to auscultation bilaterally. Heart:  Regular rate and rhythm, S1, S2 normal, no murmur, click, rub or gallop. Abdomen:   Soft, non-tender. Bowel sounds normal. No masses,  No organomegaly.    Extremities: Extremities normal, atraumatic, no cyanosis or edema,Catheter site is non tender,AVG has good thrill & Bruit   Skin: Skin color, texture, turgor normal. No rashes or lesions         Data Review:    CBC w/Diff    Recent Labs     02/16/22  0430 02/15/22  0426 02/15/22  0426   WBC 1.9*  --  2.2*   RBC 3.18*  --  3.32*   HGB 9.0*  --  9.3*   HCT 29.4*  --  30.9*   MCV 92.5   < > 93.1   MCH 28.3   < > 28.0   MCHC 30.6*   < > 30.1*   RDW 14.0   < > 14.1    < > = values in this interval not displayed. Recent Labs     02/16/22  0430 02/15/22  0426 02/15/22  0426   MONOS 18*  --  14*   EOS 6*  --  5   BASOS 1   < > 1   RDW 14.0   < > 14.1    < > = values in this interval not displayed.         Comprehensive Metabolic Profile    Recent Labs     02/16/22  0430 02/15/22  0426   * 136   K 4.5 4.3    100   CO2 25 29   BUN 40* 26*   CREA 7.30* 5.28*    Recent Labs     02/16/22  0430 02/15/22  0427 02/15/22  0426   CA 7.7* 7.9* 8.1*   PHOS 6.5*  --  5.1*         Result Notes    Component Ref Range & Units 2/16/22 0430 10/24/21 2000   Hep B Core Ab, total Negative   Positive Abnormal   Positive Abnormal  CM    Comment: (NOTE)       Contains abnormal data LYMPHOCYTES, CD4 PERCENT AND ABSOLUTE  Order: [id number]   Collected 2/14/2022 15:20     Status: Final result     Next appt: None     0 Result Notes    Component Ref Range & Units 2/14/22 1520 2/13/22 2115 10/28/21 0126 10/27/21 0314 10/26/21 0425 10/25/21 1830 10/25/21 1035   Abs CD4 [place] - 1,519 /uL 14 Low           % CD 4 Pos Lymph 30.8 - 58.5 % 2.7 Low           Comment: (NOTE)   Performed At: [facility] & [address] [id number]   Sriram Hernandez MD [id number]    WBC 3.4 - 10.8 x10E3/uL 2.3 Low   2.8 Low  R  3.9 Low  R  4.1 Low  R  4.5 Low  R   4.0 Low             Component Ref Range & Units 2/15/22 0427 2/15/22 0426 2/13/22 2115 10/28/21 0126 10/27/21 0314 10/26/21 0425 10/26/21 0425   Calcium 8.5 - 10.1 MG/DL 7.9 Low   8.1 Low   7.9 Low   8.4 Low   8.3 Low   7.7 Low   7.6 Low     PTH, Intact 18.4 - 88.0 pg/mL 1,146.5 High       1,135.7 High  Impression:       Active Hospital Problems    Diagnosis Date Noted    Secondary hyperparathyroidism of renal origin ([facility] 02/14/2022    AIDS (acquired immunodeficiency syndrome), CD4 <=200 ([facility] 11/08/2021    Volume overload 10/25/2021    Respiratory failure ([facility] 10/25/2021    ESRD (end stage renal disease) on dialysis ([facility] 10/25/2021    Hypertension 10/25/2021            Plan: Will order Hep B core IGM & IG G antibody, Continue Prophylax for MAC & PCJ  As per ID recommendations,continue AIDS antiretroviral medicine as per ID . Will increase Hectorol dose. Dialysis tomorrow. DC after wards. Regarding transfer [facility] will be handled by Jennifer Paz admission team,until accepted in [place] she has to continue to get her dialysis at [facility] unit.       Becca Herrera MD

## 2022-02-18 VITALS
HEART RATE: 88 BPM | SYSTOLIC BLOOD PRESSURE: 136 MMHG | WEIGHT: 113.1 LBS | BODY MASS INDEX: 21.35 KG/M2 | OXYGEN SATURATION: 99 % | DIASTOLIC BLOOD PRESSURE: 79 MMHG | RESPIRATION RATE: 18 BRPM | TEMPERATURE: 97.6 F | HEIGHT: 61 IN

## 2022-02-18 LAB
ANION GAP SERPL CALC-SCNC: 8 MMOL/L (ref 3–18)
BASOPHILS # BLD: 0 K/UL (ref 0–0.1)
BASOPHILS NFR BLD: 1 % (ref 0–2)
BUN SERPL-MCNC: 39 MG/DL (ref 7–18)
BUN/CREAT SERPL: 5 (ref 12–20)
CALCIUM SERPL-MCNC: 8.5 MG/DL (ref 8.5–10.1)
CHLORIDE SERPL-SCNC: 98 MMOL/L (ref 100–111)
CO2 SERPL-SCNC: 25 MMOL/L (ref 21–32)
CREAT SERPL-MCNC: 7.56 MG/DL (ref 0.6–1.3)
DIFFERENTIAL METHOD BLD: ABNORMAL
EOSINOPHIL # BLD: 0.1 K/UL (ref 0–0.4)
EOSINOPHIL NFR BLD: 4 % (ref 0–5)
ERYTHROCYTE [DISTWIDTH] IN BLOOD BY AUTOMATED COUNT: 14 % (ref 11.6–14.5)
GLUCOSE SERPL-MCNC: 83 MG/DL (ref 74–99)
HCT VFR BLD AUTO: 27 % (ref 35–45)
HGB BLD-MCNC: 8.4 G/DL (ref 12–16)
IMM GRANULOCYTES # BLD AUTO: 0 K/UL (ref 0–0.04)
IMM GRANULOCYTES NFR BLD AUTO: 2 % (ref 0–0.5)
LYMPHOCYTES # BLD: 0.4 K/UL (ref 0.9–3.6)
LYMPHOCYTES NFR BLD: 19 % (ref 21–52)
MCH RBC QN AUTO: 28.4 PG (ref 24–34)
MCHC RBC AUTO-ENTMCNC: 31.1 G/DL (ref 31–37)
MCV RBC AUTO: 91.2 FL (ref 78–100)
MONOCYTES # BLD: 0.5 K/UL (ref 0.05–1.2)
MONOCYTES NFR BLD: 20 % (ref 3–10)
NEUTS SEG # BLD: 1.3 K/UL (ref 1.8–8)
NEUTS SEG NFR BLD: 55 % (ref 40–73)
NRBC # BLD: 0 K/UL (ref 0–0.01)
NRBC BLD-RTO: 0 PER 100 WBC
PHOSPHATE SERPL-MCNC: 5.2 MG/DL (ref 2.5–4.9)
PLATELET # BLD AUTO: 151 K/UL (ref 135–420)
PMV BLD AUTO: 10.2 FL (ref 9.2–11.8)
POTASSIUM SERPL-SCNC: 5 MMOL/L (ref 3.5–5.5)
RBC # BLD AUTO: 2.96 M/UL (ref 4.2–5.3)
SODIUM SERPL-SCNC: 131 MMOL/L (ref 136–145)
WBC # BLD AUTO: 2.3 K/UL (ref 4.6–13.2)

## 2022-02-18 PROCEDURE — 84100 ASSAY OF PHOSPHORUS: CPT

## 2022-02-18 PROCEDURE — 99239 HOSP IP/OBS DSCHRG MGMT >30: CPT | Performed by: STUDENT IN AN ORGANIZED HEALTH CARE EDUCATION/TRAINING PROGRAM

## 2022-02-18 PROCEDURE — 80048 BASIC METABOLIC PNL TOTAL CA: CPT

## 2022-02-18 PROCEDURE — 74011000250 HC RX REV CODE- 250: Performed by: INTERNAL MEDICINE

## 2022-02-18 PROCEDURE — 85025 COMPLETE CBC W/AUTO DIFF WBC: CPT

## 2022-02-18 PROCEDURE — 90935 HEMODIALYSIS ONE EVALUATION: CPT

## 2022-02-18 PROCEDURE — 36415 COLL VENOUS BLD VENIPUNCTURE: CPT

## 2022-02-18 PROCEDURE — 74011250637 HC RX REV CODE- 250/637: Performed by: INTERNAL MEDICINE

## 2022-02-18 PROCEDURE — 2709999900 HC NON-CHARGEABLE SUPPLY

## 2022-02-18 PROCEDURE — 74011250636 HC RX REV CODE- 250/636: Performed by: INTERNAL MEDICINE

## 2022-02-18 RX ORDER — AMLODIPINE BESYLATE 10 MG/1
10 TABLET ORAL DAILY
Qty: 30 TABLET | Refills: 0 | Status: SHIPPED | OUTPATIENT
Start: 2022-02-19

## 2022-02-18 RX ORDER — AZITHROMYCIN 600 MG/1
1200 TABLET, FILM COATED ORAL
Qty: 30 TABLET | Refills: 0 | Status: SHIPPED | OUTPATIENT
Start: 2022-02-21

## 2022-02-18 RX ORDER — CARVEDILOL 6.25 MG/1
6.25 TABLET ORAL 2 TIMES DAILY WITH MEALS
Qty: 60 TABLET | Refills: 0 | Status: SHIPPED | OUTPATIENT
Start: 2022-02-18

## 2022-02-18 RX ORDER — HEPARIN SODIUM 1000 [USP'U]/ML
5000 INJECTION, SOLUTION INTRAVENOUS; SUBCUTANEOUS
Status: DISCONTINUED | OUTPATIENT
Start: 2022-02-18 | End: 2022-02-18 | Stop reason: HOSPADM

## 2022-02-18 RX ORDER — ATOVAQUONE 750 MG/5ML
750 SUSPENSION ORAL
Qty: 150 ML | Refills: 0 | Status: ON HOLD | OUTPATIENT
Start: 2022-02-19 | End: 2022-03-23

## 2022-02-18 RX ORDER — LOSARTAN POTASSIUM 100 MG/1
100 TABLET ORAL DAILY
Qty: 30 TABLET | Refills: 0 | Status: SHIPPED | OUTPATIENT
Start: 2022-02-19

## 2022-02-18 RX ADMIN — CALCIUM ACETATE 667 MG: 667 CAPSULE ORAL at 08:29

## 2022-02-18 RX ADMIN — ATOVAQUONE 750 MG: 750 SUSPENSION ORAL at 08:29

## 2022-02-18 RX ADMIN — ACETAMINOPHEN 650 MG: 325 TABLET ORAL at 04:50

## 2022-02-18 RX ADMIN — CALCIUM ACETATE 667 MG: 667 CAPSULE ORAL at 13:16

## 2022-02-18 RX ADMIN — SODIUM CHLORIDE, PRESERVATIVE FREE 10 ML: 5 INJECTION INTRAVENOUS at 15:35

## 2022-02-18 RX ADMIN — HEPARIN SODIUM 5000 UNITS: 5000 INJECTION INTRAVENOUS; SUBCUTANEOUS at 15:35

## 2022-02-18 RX ADMIN — SODIUM CHLORIDE, PRESERVATIVE FREE 10 ML: 5 INJECTION INTRAVENOUS at 15:39

## 2022-02-18 RX ADMIN — SODIUM CHLORIDE, PRESERVATIVE FREE 10 ML: 5 INJECTION INTRAVENOUS at 05:25

## 2022-02-18 RX ADMIN — DOXERCALCIFEROL 3 MCG: 4 INJECTION, SOLUTION INTRAVENOUS at 11:16

## 2022-02-18 RX ADMIN — ACETAMINOPHEN 650 MG: 325 TABLET ORAL at 13:16

## 2022-02-18 RX ADMIN — HEPARIN SODIUM 5000 UNITS: 1000 INJECTION, SOLUTION INTRAVENOUS; SUBCUTANEOUS at 12:55

## 2022-02-18 NOTE — PROGRESS NOTES
Progress Note    Mir Smith  63 y.o. Admit Date: 2/13/2022  Active Problems:    ESRD (end stage renal disease) on dialysis ([facility] (10/25/2021) POA: Yes      Volume overload (10/25/2021) POA: Yes      Hypertension (10/25/2021) POA: Yes      Respiratory failure ([facility] (10/25/2021) POA: Yes      AIDS (acquired immunodeficiency syndrome), CD4 <=200 ([email or web address] (11/8/2021) POA: Unknown      Secondary hyperparathyroidism of renal origin ([facility] (2/14/2022) POA: Unknown            Subjective:     Patient feels good, seen during dialysis, no new complain,Using catheter       A comprehensive review of systems was negative except for that written in the History of Present Illness.     Objective:     Visit Vitals  BP (!) 141/91   Pulse 85   Temp 97.5 °F (36.4 °C) (Oral)   Resp 18   Ht 5' 1\" (1.549 m)   Wt 51.3 kg (113 lb 1.6 oz)   SpO2 99%   BMI 21.37 kg/m²         Intake/Output Summary (Last 24 hours) at 2/18/2022 1316  Last data filed at 2/18/2022 1300  Gross per 24 hour   Intake 240 ml   Output 2240 ml   Net -2000 ml       Current Facility-Administered Medications   Medication Dose Route Frequency Provider Last Rate Last Admin    heparin (porcine) 1,000 unit/mL injection 5,000 Units  5,000 Units IntraCATHeter DIALYSIS PRN Vanessa Luz MD   5,000 Units at 02/18/22 1255    doxercalciferoL (HECTOROL) 4 mcg/2 mL injection 3 mcg  3 mcg IntraVENous DIALYSIS MON, WED & [facility] Maci Chu MD   3 mcg at 02/18/22 1116    carvediloL (COREG) tablet 6.25 mg  6.25 mg Oral BID WITH MEALS Vanessa Luz MD   6.25 mg at 02/17/22 1607    calcium acetate(phosphat bind) (PHOSLO) capsule 667 mg  1 Capsule Oral TID WITH MEALS Vanessa Luz MD   667 mg at 02/18/22 0829    epoetin romelia-epbx (RETACRIT) injection 4,000 Units  4,000 Units SubCUTAneous Q MON, WED & Aleyda Tejeda MD   4,000 Units at 02/16/22 2051    losartan (COZAAR) tablet 100 mg  100 mg Oral DAILY May Castillo DO   100 mg at 02/17/22 0820    sodium chloride (NS) flush 5-40 mL  5-40 mL IntraVENous Q8H Benjamen Indra, DO   10 mL at 02/18/22 0525    sodium chloride (NS) flush 5-40 mL  5-40 mL IntraVENous PRN Benjamen Indra, DO        acetaminophen (TYLENOL) tablet 650 mg  650 mg Oral Q6H PRN Benjamen Indra, DO   650 mg at 02/18/22 2375    Or    acetaminophen (TYLENOL) suppository 650 mg  650 mg Rectal Q6H PRN Benjamen Indra, DO        polyethylene glycol (MIRALAX) packet 17 g  17 g Oral DAILY PRN Benjamen Indra, DO        heparin (porcine) injection 5,000 Units  5,000 Units SubCUTAneous Q8H Benjamen Indra, DO   5,000 Units at 02/17/22 1323    promethazine (PHENERGAN) tablet 12.5 mg  12.5 mg Oral Q6H PRN Benjamen Indra, DO        Or    ondansetron [facility]) injection 4 mg  4 mg IntraVENous Q6H PRN Benjamen Indra, DO        atovaquone (MEPRON) 750 mg/5 mL oral suspension 750 mg  750 mg Oral DAILY WITH BREAKFAST [place], [place], DO   750 mg at 02/18/22 9990    azithromycin (ZITHROMAX) tablet 1,200 mg  1,200 mg Oral Q7D [facility], DO   1,200 mg at 02/14/22 1817    amLODIPine (NORVASC) tablet 10 mg  10 mg Oral DAILY Galilea Mcfarland MD   10 mg at 02/17/22 0820    sodium chloride (NS) flush 5-40 mL  5-40 mL IntraVENous Q8H Benjamen Indra, DO   10 mL at 02/18/22 0294    sodium chloride (NS) flush 5-40 mL  5-40 mL IntraVENous PRN Benjamen Indra, DO            Physical Exam:     Physical Exam:   General:  Alert, cooperative, no distress, appears stated age. Neck: Supple, symmetrical, trachea midline, no adenopathy, thyroid: no enlargement/tenderness/nodules, no carotid bruit and no JVD. Lungs:   Clear to auscultation bilaterally. Heart:  Regular rate and rhythm, S1, S2 normal, no murmur, click, rub or gallop. Abdomen:   Soft, non-tender. Bowel sounds normal. No masses,  No organomegaly. Extremities: Extremities normal, atraumatic, no cyanosis or edema.          Data Review:    CBC w/Diff    Recent Labs     02/18/22  0430 02/16/22  0430 02/16/22 0430   WBC 2.3* --  1.9*   RBC 2.96*  --  3.18*   HGB 8.4*  --  9.0*   HCT 27.0*  --  29.4*   MCV 91.2   < > 92.5   MCH 28.4   < > 28.3   MCHC 31.1   < > 30.6*   RDW 14.0   < > 14.0    < > = values in this interval not displayed. Recent Labs     02/18/22 0430 02/16/22 0430 02/16/22 0430   MONOS 20*  --  18*   EOS 4  --  6*   BASOS 1   < > 1   RDW 14.0   < > 14.0    < > = values in this interval not displayed. Comprehensive Metabolic Profile    Recent Labs     02/18/22 0430 02/16/22 0430   * 135*   K 5.0 4.5   CL 98* 101   CO2 25 25   BUN 39* 40*   CREA 7.56* 7.30*    Recent Labs     02/18/22 0430 02/16/22 0430   CA 8.5 7.7*   PHOS 5.2* 6.5*                        Impression:       Active Hospital Problems    Diagnosis Date Noted    Secondary hyperparathyroidism of renal origin ([facility] 02/14/2022    AIDS (acquired immunodeficiency syndrome), CD4 <=200 ([facility] 11/08/2021    Volume overload 10/25/2021    Respiratory failure ([address] 10/25/2021    ESRD (end stage renal disease) on dialysis ([facility] 10/25/2021    Hypertension 10/25/2021            Plan: Tolerating HD well on 2 K,2.5 ca, UF 1500, Retacrit & Hectorol as ordered, Dc after dialysis. Continue OP dialysis [facility] until transfer finalized to [facility]. .  Discussed with her Daughter.       Stephenie Russo MD

## 2022-02-18 NOTE — DIALYSIS
JASMYNE        ACUTE HEMODIALYSIS FLOW SHEET      HEMODIALYSIS ORDERS: Physician: Edelmira Bryant     Dialyzer: revaclear   Duration: 3 hr  BFR: 350   DFR: 600   Dialysate:  Temp 36-37*C  K+   2    Ca+  2.5 Na 138 Bicarb 35   Weight:  51.3 kg    Patient Chart [x]     Unable to Obtain []     Dry weight/UF Goal: 2000   Access: right chest TDC    Heparin [x]  Bolus 1000 Units       Catheter locking solution: heparin    Pre BP:   147/87    Pulse:     82       Respirations: 18  Temperature:   98.2 oral   Labs: Pre        Post:         [x] N/A   Additional Orders(medications, blood products, hypotension management):     hectorol     [x] Jasmyne Consent Verified     CATHETER ACCESS: []N/A   [x]Right chest  []Left   []IJ     []Fem   []transhepatic   [] First use X-ray verified     [x]Permanent                [] Temporary   [x]No S/S infection  []Redness  []Drainage []Cultured []Swelling []Pain   [x]Medical Aseptic Prep Utilized   []Dressing Changed  [x] Biopatch  Date: 2/14/22       []Clotted   [x]Patent   Flows: [x]Good  []Poor  []Reversed   If access problem,  notified: []Yes    [x]N/A           GRAFT/FISTULA ACCESS:  [x]N/A                                GENERAL ASSESSMENT:      LUNGS:  Rate 18 SaO2 %        [] N/A    [x] Clear  [] Coarse  [] Crackles  [] Wheezing        [] Diminished     Location : []RLL   []LLL    []RUL  []PAYAL     Cough: []Productive  []Dry  [x]N/A   Respirations:  [x]Easy  []Labored     Therapy:   [x]RA  []NC  l/min    Mask: []NRB []Venti       O2%                  []Ventilator  []Intubated  [] Trach  [] BiPaP     CARDIAC: [x]Regular      [] Irregular   [] Pericardial Rub  [] JVD        []  Monitored  [] Bedside  [] Remotely monitored [x] N/A      EDEMA: [] None   [x]Generalized  [] Pitting [] 1    [] 2    [] 3    [] 4                 [] Facial  [] Pedal  []  UE  [] LE     SKIN:   [x] Warm   [] Hot     [] Cold   [x] Dry     [] Pale   [] Diaphoretic                  [] Flushed  [] Jaundiced  [] Cyanotic  [] Rash  [] Weeping     LOC:    [x] Alert      [x]Oriented:    [x] Person     [x] Place  [x]Time               [] Confused  [] Lethargic  [] Medicated  [] Non-responsive     GI / ABDOMEN:  [] Flat    [] Distended    [x] Soft    [] Firm   []  Obese                             [] Diarrhea  [x] Bowel Sounds  [] Nausea  [] Vomiting       / URINE ASSESSMENT:[] Voiding   [x] Oliguria  [] Anuria   []  Aragon     [] Incontinent    []  Incontinent Brief      []  Bathroom Privileges       PAIN: [x] 0 []1  []2   []3   []4   []5   []6   []7   []8   []9   []10              Scale 0-10  Action/Follow Up:      MOBILITY:  [] Amb    [] Amb/Assist    [x] Bed    [] Wheelchair  [] Stretcher      All Vitals and Treatment Details on Attached [address]: [facility]          Room # 9878/82      [] 1st Time Acute  [] Stat  [x] Routine  [] Urgent     [x] Acute Room  []  Bedside  [] ICU/CCU  [] ER   Isolation Precautions:   There are currently no Active Isolations      Special Considerations:         [] Blood Consent Verified [x]N/A      ALLERGIES:   Allergies   Allergen Reactions    Ceftriaxone Itching     Rxn in ER recorded 2007      Aspirin Hives    Pcn [Penicillins] Hives               Code Status:Full Code        Hepatitis Status:                        Lab Results   Component Value Date/Time    Hepatitis B surface Ag 0.15 02/14/2022 03:20 PM    Hepatitis B surface Ab 115.39 02/14/2022 03:20 PM    Hep B Core Ab, total Positive (A) 02/16/2022 04:30 AM                     Current Labs:   Lab Results   Component Value Date/Time    Sodium 131 (L) 02/18/2022 04:30 AM    Potassium 5.0 02/18/2022 04:30 AM    Chloride 98 (L) 02/18/2022 04:30 AM    CO2 25 02/18/2022 04:30 AM    Anion gap 8 02/18/2022 04:30 AM    Glucose 83 02/18/2022 04:30 AM    BUN 39 (H) 02/18/2022 04:30 AM    Creatinine 7.56 (H) 02/18/2022 04:30 AM    BUN/Creatinine ratio 5 (L) 02/18/2022 04:30 AM    GFR est AA 7 (L) 02/18/2022 04:30 AM    GFR est non-AA 5 (L) 02/18/2022 04:30 AM    Calcium 8.5 02/18/2022 04:30 AM      Lab Results   Component Value Date/Time    WBC 2.3 (L) 02/18/2022 04:30 AM    HGB 8.4 (L) 02/18/2022 04:30 AM    HCT 27.0 (L) 02/18/2022 04:30 AM    PLATELET 117 04/82/3310 04:30 AM    MCV 91.2 02/18/2022 04:30 AM                                                                                     DIET:   DIET ADULT       PRIMARY NURSE REPORT: First initial/Last name/Title      Pre Dialysis: DARIAN Montanez RN     Time: 9117      EDUCATION:    [x] Patient [] Other         Knowledge Basis: []None [x]Minimal [] Substantial   Barriers to learning: periods of confusion   [] Access Care     [] S&S of infection     [] Fluid Management     []K+     [x]Procedural    []Albumin     [] Medications     [] Tx Options     [] Transplant     [] Diet     [] Other   Teaching Tools:  [x] Explain  [x] Demo  [] Handouts [] Video  Patient response:  [x] Verbalized understanding  [] Teach back  [] Return demonstration [] Requires follow up   Inappropriate due to:            [x]Time Out/Safety Check  [x]Extracorporeal Circuit Tested for integrity       RO/HEMODIALYSIS MACHINE SAFETY CHECKS  Before each treatment:       Machine Number:                   [facility]                                                                    [x] Unit Machine # 3 with centralized RO                                                                     Alarm Test:  Pass time 0813               [x] RO/Machine Log Complete      Temp   36             Dialysate: pH  7.4 Conductivity: Meter   13.8     HD Machine   13.6                  TCD: 13.8  Dialyzer Lot # [id number]            Blood Tubing Lot # [id number]          Saline Lot #  [id number]     CHLORINE TESTING-Before each treatment and every 4 hours    Total Chlorine: [x] less than 0.1 ppm  Time: 0900 4 Hr/2nd Check Time: 1300   (if greater than 0.1 ppm from Primary then every 30 minutes from Secondary)     TREATMENT INITIATION  with Dialysis Precautions: [x] All Connections Secured                 [x] Saline Line Double Clamped   [x] Venous Parameters Set                  [x] Arterial Parameters Set    [x] Prime Given 250ml                          [x]Air Foam Detector Engaged      Treatment Initiation Note:  Pt arrived to HD unit via bed. A&Ox4 with periods of confusion. Resp even/unlabored on RA. Right chest TDC assessed with no s/s complications. HD initiated without difficulty. Heparin bolus administered per order. During Treatment Notes:  0945 Face and access in view with connections secure. 1000 Face and access in view with connections secure. 1015 Face and access in view with connections secure. 1030 Face and access in view with connections secure. 1045 Face and access in view with connections secure. 1100 Face and access in view with connections secure. 1115 Face and access in view with connections secure. 1130 Face and access in view with connections secure. 1145 Face and access in view with connections secure. 1200 Face and access in view with connections secure. 1215 Face and access in view with connections secure. 1230 Face and access in view with connections secure. 1245 Face and access in view with connections secure. 1255 Treatment complete. Medication Dose Volume Route Time [facility] name Title   heparin 1000 units 1 ml dialysis 0945 P Ed [place]   hectorol 3 mcg 1.5 ml dialysis 1116 P Ed [place]                   Post Assessment:   Dialyzer Cleared: [] Good [x] Fair  [] Poor  Blood processed:  61.3 L  UF Removed  2500 mL  POst BP:   141/91       Pulse: 85        Respirations: 18  Temperature: 97.5 Lungs:     [x] Clear      [] Course         [] Crackles    [] Wheezing         [] Diminished   Post Tx Vascular Access:      N/A Cardiac:   [x] Regular   [] Irregular   [] Monitor  [x] N/A           Catheter:   Locking solution: Heparin 1:1000   Art.  1.9  Vic. 1.9    Skin:   Pain:   [x] Warm  [x] Dry [] Diaphoretic    [] Flushed    [] Pale [] Cyanotic [x]0  []1  []2   []3  []4   []5   []6   []7   []8   []9   []10     Post Treatment Note:  Pt tolerated treatment well. Net 2 L UF removed. POST TREATMENT PRIMARY NURSE HANDOFF REPORT:     First initial/Last name/Title         Post Dialysis: DARIAN Qiu RN     Time:  0512     Abbreviations: AVG-arterial venous graft, AVF-arterial venous fistula, IJ-Internal Jugular, Subcl-Subclavian, Fem-Femoral, Tx-treatment, AP/HR-apical heart rate, DFR-dialysate flow rate, BFR-blood flow rate, AP-arterial pressure, -venous pressure, UF-ultrafiltrate, TMP-transmembrane pressure, Vic-Venous, Art-Arterial, RO-Reverse Osmosis

## 2022-02-18 NOTE — PROGRESS NOTES
Discharge order noted for today. Pt has been accepted to [address] [place] [address] agency. Met with patient and spoke to her daughter Dmitry Bains and are agreeable to the transition plan today. Transport has been arranged through pt's family. Patient's discharge summary and home health  orders have been forwarded to [facility]  agency. Updated bedside RN,  to the transition plan. Discharge information has been documented on the AVS.     Called pt's daughter. She wants nurse to call her to come  pt when pt ready.         NATALY TierneyN RN  Care Management  Pager: [phone]

## 2022-02-18 NOTE — DISCHARGE INSTRUCTIONS
Patient Education        Kidney Disease and High Blood Pressure: Care Instructions  Overview     Long-term (chronic) kidney disease happens when the kidneys cannot remove waste and keep your body's fluids and chemicals in balance. Usually, the kidneys remove waste from the blood through the urine. When the kidneys are not working well, waste can build up so much that it poisons the body. Kidney disease can make you very tired. It also can cause swelling, or edema, in your legs or other areas of your body. High blood pressure is one of the major causes of chronic kidney disease. And kidney disease can also cause high blood pressure. No matter which came first, having high blood pressure damages the tiny blood vessels in the kidneys. If you have high blood pressure, it is important to lower it. There are many things you can do to lower your blood pressure, which may help slow or stop the damage to your kidneys. Follow-up care is a key part of your treatment and safety. Be sure to make and go to all appointments, and call your doctor if you are having problems. It's also a good idea to know your test results and keep a list of the medicines you take. How can you care for yourself at home? · Be safe with medicines. Take your medicines exactly as prescribed. Call your doctor if you have any problems with your medicine. You will probably need more than one medicine to lower your blood pressure. You will get more details on the specific medicines your doctor prescribes. · Work with your doctor and a dietitian to plan meals that have the right amount of nutrients for you. You will probably have to limit salt, fluids, and protein. · Stay at a healthy weight. This is very important if you put on weight around the waist. Losing even 10 pounds can help you lower your blood pressure. · Manage other health problems such as diabetes and high cholesterol.  You can help lower your risk for heart disease and blood vessel problems with a healthy lifestyle along with medicines. · Do not take ibuprofen (Advil, Motrin) or naproxen (Aleve), or similar medicines, unless your doctor tells you to. They may make chronic kidney disease worse. It is okay to take acetaminophen (Tylenol). · If your doctor recommends it, get more exercise. Walking is a good choice. Bit by bit, increase the amount you walk every day. Try for at least 30 minutes on most days of the week. You also may want to swim, bike, or do other activities. · Limit or avoid alcohol. Talk to your doctor about whether you can drink any alcohol. · Do not smoke or allow others to smoke around you. If you need help quitting, talk to your doctor about stop-smoking programs and medicines. These can increase your chances of quitting for good. When should you call for help? Call 911 anytime you think you may need emergency care. For example, call if:    · You passed out (lost consciousness). Call your doctor now or seek immediate medical care if:    · You have new or worse nausea and vomiting.     · You have much less urine than normal, or you have no urine.     · You are feeling confused or cannot think clearly.     · You have new or more blood in your urine.     · You have new swelling.     · You are dizzy or lightheaded, or you feel like you may faint. Watch closely for changes in your health, and be sure to contact your doctor if:    · You do not get better as expected. Where can you learn more? Go to http://www.gray.com/  Enter S134 in the search box to learn more about \"Kidney Disease and High Blood Pressure: Care Instructions. \"  Current as of: December 17, 2020               Content Version: 13.0  © 4706-4239 [employer]. Care instructions adapted under license by [employer] (which disclaims liability or warranty for this information).  If you have questions about a medical condition or this instruction, always ask your healthcare professional. [address] any warranty or liability for your use of this information. Kidney Dialysis: Care Instructions  Overview     Dialysis is a process that filters wastes from the blood when your kidneys can no longer do the job. It is not a cure, but it can help you live longer and feel better. It is a lifesaving treatment when you have kidney failure. Normal kidneys work 24 hours a day to clean wastes from your blood. Your kidneys are not able to do this job, so a process called dialysis will do some of the work for your kidneys. You and your doctor will decide which type of dialysis you should have. Peritoneal dialysis uses the lining of your belly (peritoneum) to filter your blood. You can do it at home, on a daily basis. Hemodialysis uses a man-made filter called a dialyzer to clean your blood. Most people need to go to a hospital or clinic 3 days a week for several hours each time. Sometimes hemodialysis can be done at home. It is normal to have questions about your treatment, and you have a right to know what is happening to you. Learning about dialysis can help you take an active role in your treatment. Dialysis does not cure kidney disease, but it can help you live longer and feel better. You will need to follow your diet and treatment schedule carefully. Follow-up care is a key part of your treatment and safety. Be sure to make and go to all appointments, and call your doctor if you are having problems. It's also a good idea to know your test results and keep a list of the medicines you take. What do you need to know about peritoneal dialysis? Peritoneal dialysis uses the lining of your belly (or peritoneal membrane) to filter your blood. Before you can begin peritoneal dialysis, your doctor will need to place a thin tube called a catheter in your belly. This is the dialysis access. · Peritoneal dialysis can be done at home or in any clean place. You may be able to do it while you sleep. · You can do it by yourself. You don't have to rely on help from others. · You can do it at the times you choose as long as you do the right number of treatments. · It has to be done every day of the week. · Some people find it hard to do all the required steps. · It increases your chance for a serious infection of the lining of the belly (peritoneum). Overview  Hemodialysis uses a man-made membrane (dialyzer) to clean your blood. You're connected to the dialyzer by tubes attached to your blood vessels. Before you start dialysis, your doctor will create a site where the blood can flow in and out of your body during your sessions. · Hemodialysis is done mainly by trained health workers. They can watch for problems. · You can do it at a center where other people are doing dialysis. This can help provide emotional support. · You can schedule your treatments in the evenings and maybe at home. This gives you more control over your schedule. · It usually needs to be done on a set schedule 3 times a week. · It can cause side effects, like low blood pressure and muscle cramps. These can often be treated easily. · It requires being poked by a needle at each treatment. This bothers some people. Others get used to it and can do it themselves. How can you care for yourself at home? · Be sure to have all of your dialysis sessions. Do not try to shorten or skip your sessions. You have a better chance of a longer and healthier life by getting your full treatment. · Your doctor or health care team will show you the steps you need to go through each day before, during, and after dialysis. Be sure to follow these steps. If you do not understand a step, talk to your team.  · Your doctor and dietitian will help you design menus that follow your diet. Be sure to follow your diet guidelines. ?  You will need to limit fluids and certain foods that contain salt (sodium), potassium, and phosphorus. ? You may need higher levels of protein in your diet. · Your doctor may recommend certain vitamins. But do not take any other medicine, including over-the-counter medicines, vitamins, and herbal products, without talking to your doctor first.  · Do not smoke. Smoking raises your risk of many health problems, including more kidney damage. If you need help quitting, talk to your doctor about stop-smoking programs and medicines. These can increase your chances of quitting for good. · Do not take ibuprofen (Advil, Motrin), naproxen (Aleve), or similar medicines, unless your doctor tells you to. These medicines may make kidney problems worse. When should you call for help? Call your doctor now or seek immediate medical care if:    · You have a fever.     · You are dizzy or lightheaded, or you feel like you may faint.     · You are confused or cannot think clearly.     · You have new or worse nausea or vomiting.     · You have new or more blood in your urine.     · You have new swelling. Where can you learn more? Go to http://www.gray.com/  Enter T100 in the search box to learn more about \"Kidney Dialysis: Care Instructions. \"  Current as of: December 17, 2020               Content Version: 13.0  © 2006-2021 [employer]. Care instructions adapted under license by [employer] (which disclaims liability or warranty for this information). If you have questions about a medical condition or this instruction, always ask your healthcare professional. [place] [address] any warranty or liability for your use of this information. Learning About Respiratory Failure  What is respiratory failure? Respiratory failure happens when a person's lungs can't get enough oxygen to the blood. This is a severe problem that may need to be treated in intensive care.   Many organs such as the eyes, the brain, and the heart depend on a steady supply of oxygen they get from the blood. The doctor will try to get enough oxygen to those organs to keep them healthy. Many things can cause lung failure. They include pneumonia and other serious infections. The doctor will look for the cause of the problem and then treat it if possible. How is it treated? To help your lungs get enough oxygen, your doctor may use a few devices. These vary in how much oxygen they give and how they help you breathe. They are:  · A nasal cannula (say \"KATIE-ericah-salvador\"). This is a thin tube with two prongs that fit just inside your nose. Or you may get a face mask. · A special face mask that delivers more oxygen. There are different kinds. A face mask with a bag on one end is called a non-rebreather mask. · A high-flow nasal cannula. It can warm and wet the oxygen it delivers, so getting high amounts of oxygen feels better. · A face mask that gives you oxygen through a bilevel positive airway pressure (BPAP) machine. You may also hear this called BiPAP. It uses different air pressures when you breathe in and out. · A ventilator that helps you breathe or that breathes for you. It controls how much air and oxygen flow into your lungs. This machine requires a breathing tube in your windpipe. It can be uncomfortable, so you may get medicine to help you relax or sleep. You also will get fluid through an intravenous (IV) tube. You will get regular tests to see how much oxygen is in your blood. Tests also can show how well the lungs are working. These tests help your doctor adjust the machines and the oxygen supply. The doctor will watch you closely. Current as of: July 6, 2021               Content Version: 13.0  © 2006-2021 [employer]. Care instructions adapted under license by [employer] (which disclaims liability or warranty for this information).  If you have questions about a medical condition or this instruction, always ask your healthcare professional. [employer] disclaims any warranty or liability for your use of this information.

## 2022-02-18 NOTE — PROGRESS NOTES
Bedside and Verbal shift change report given to Jenna Jamison RN (oncoming nurse) by Marcia Durant RN (offgoing nurse). Report included the following information SBAR, Kardex, ED Summary, Intake/Output, MAR, Recent Results, Med Rec Status and Cardiac Rhythm NSR.     1948: Pt alert and oriented to self and place, disoriented to time and situation, denies pain or sob, on room air, V/S and shift assessment completed, bed locked and low position, call bell within reach    2136: Pt refused Heparin sc    2222: pt c/o L arm pain, Tylenol 650 mg PO given    2344: Pt sleeping, no distress noted, no change from previous assessment, bed alarm on at all times    0103: V/S done    0350: sleeping, no change from previous assessment    0529: V/S monitoring done    Bedside and Verbal shift change report given to Checo Hatfield RN (oncoming nurse) by Jenna Jamison RN (offgoing nurse). Report included the following information SBAR, Kardex, ED Summary, Intake/Output, MAR, Recent Results, Med Rec Status and Cardiac Rhythm NSR.

## 2022-02-18 NOTE — DISCHARGE SUMMARY
Discharge Summary    Patient: Hailey Wheeler MRN: [id number]  CSN: [id number]    YOB: 1960  Age: 63 y.o. Sex: female    DOA: 2/13/2022 LOS:  LOS: 5 days   Discharge Date:      Admission Diagnosis: Volume overload [E87.70]  Respiratory failure ([facility] [J96.90]    Discharge Diagnosis:    Hospital Problems  Date Reviewed: 2/14/2022          Codes Class Noted POA    Secondary hyperparathyroidism of renal origin [facility]) ICD-10-CM: N25.81  ICD-9-CM: 588.81  2/14/2022 Unknown        AIDS (acquired immunodeficiency syndrome), CD4 <=200 ([facility] ICD-10-CM: B20  ICD-9-CM: 459  11/8/2021 Unknown        ESRD (end stage renal disease) on dialysis [facility]) ICD-10-CM: N18.6, Z99.2  ICD-9-CM: 585.6, V45.11  10/25/2021 Yes        Volume overload ICD-10-CM: E87.70  ICD-9-CM: 276.69  10/25/2021 Yes        Hypertension ICD-10-CM: I10  ICD-9-CM: 401.9  10/25/2021 Yes        Acute Hypoxic Respiratory failure ([facility] ICD-10-CM: J96.90  ICD-9-CM: 518.81  10/25/2021 Yes              Discharge Condition: Stable    PHYSICAL EXAM  Visit Vitals  /79 (BP 1 Location: Right upper arm, BP Patient Position: At rest)   Pulse 88   Temp 97.6 °F (36.4 °C)   Resp 18   Ht 5' 1\" (1.549 m)   Wt 51.3 kg (113 lb 1.6 oz)   SpO2 99%   BMI 21.37 kg/m²       General: Alert, thin, no acute distress    HEENT: NC, Atraumatic. PERRLA, EOMI. Anicteric sclerae. Lungs:  CTA Bilaterally. No Wheezing/Rhonchi/Rales. Heart:  Regular  rhythm,  No murmur, No Rubs, No Gallops  Abdomen: Soft, Non distended, Non tender. +Bowel sounds  Extremities: No c/c/e  Psych:   Good insight. Not anxious or agitated. Neurologic:  CN 2-12 grossly intact, oriented X 3. No acute neurological                                 Deficits    Hospital Course By Problem:   1. ESRD (end stage renal disease) on dialysis ([facility]   2. Volume overload   3. Acute Hypoxic Respiratory failure   4. Hypertension    #1-4.  Patient presented to [facility] ED on 2/14/22 secondary to shortness of breath and blood pressure elevated to 200/100. Patient had missed dialysis for several sessions likely contributing to HTN, volume overload and hypoxic respiratory failure. Patient started on nitroglycerin drip initially for blood pressure and placed on bipap. Nephrology consulted. Patient dialyzed with improvement to blood pressure and oxygenation. Patient was gradually able to be placed on oral blood pressure medications with good control. She maintained good O2 sats on room air following dialysis and no longer required bipap. Patient to receive outpatient dialysis at [facility]. 5. Secondary hyperparathyroidism of renal origin ([address]    #5. Treated by nephrology. 6. AIDS (acquired immunodeficiency syndrome), CD4 <=200    #6. Followed by ID while inpatient. Arrangements made for outpatient follow up. Patient to continue on atovaquone and azithromycin until she follows up with ID. Patient was not started on     Consults:   Infectious disease     Significant Diagnostic Studies:     Discharge Medications:     Current Discharge Medication List      START taking these medications    Details   amLODIPine (NORVASC) 10 mg tablet Take 1 Tablet by mouth daily. Qty: 30 Tablet, Refills: 0      atovaquone (MEPRON) 750 mg/5 mL suspension Take 5 mL by mouth daily (with breakfast) for 30 days. Qty: 150 mL, Refills: 0      azithromycin (ZITHROMAX) 600 mg tablet Take 2 Tablets by mouth every seven (7) days. Qty: 30 Tablet, Refills: 0      carvediloL (COREG) 6.25 mg tablet Take 1 Tablet by mouth two (2) times daily (with meals). Qty: 60 Tablet, Refills: 0      losartan (COZAAR) 100 mg tablet Take 1 Tablet by mouth daily.   Qty: 30 Tablet, Refills: 0         STOP taking these medications       tenofovir DISOPROXIL FUMARATE (VIREAD) 300 mg tablet Comments:   Reason for Stopping:         darunavir ethanolate (Prezista) 800 mg tablet Comments:   Reason for Stopping:         ritonavir (NORVIR) 100 mg tablet Comments: Reason for Stopping:         dolutegravir (Tivicay) 50 mg tab tablet Comments:   Reason for Stopping:         esomeprazole (NEXIUM) 40 mg capsule Comments:   Reason for Stopping:         gabapentin (NEURONTIN) 100 mg capsule Comments:   Reason for Stopping:         lamiVUDine (EPIVIR) 150 mg tablet Comments:   Reason for Stopping:         melatonin 3 mg tablet Comments:   Reason for Stopping:         mirtazapine (REMERON) 15 mg tablet Comments:   Reason for Stopping:         pyridoxine, vitamin B6, (VITAMIN B-6) 50 mg tablet Comments:   Reason for Stopping:         QUEtiapine (SEROquel) 25 mg tablet Comments:   Reason for Stopping:         senna (SENOKOT) 8.6 mg tablet Comments:   Reason for Stopping:         trimethoprim-sulfamethoxazole (BACTRIM, SEPTRA)  mg per tablet Comments:   Reason for Stopping:         RISPERIDONE (RISPERDAL PO) Comments:   Reason for Stopping:               Activity: activity as tolerated    Diet: Regular Diet    Wound Care: None needed    Follow-up: with PCP, Hugh Hatch MD in 7-10days    Minutes spent on discharge: >30 minutes spent coordinating this discharge (review instructions/follow-up, prescriptions, preparing report for sign off)

## 2022-02-18 NOTE — PROGRESS NOTES
0700-received bedside report from off going nurse Clarita Ha RN.    1600-I have reviewed discharge instructions with the patient and caregiver. The patient and caregiver verbalized understanding.

## 2022-02-18 NOTE — PROGRESS NOTES
[facility]  (A Division of [address] [place] [address])    Follow-up Note      Date of Admission: 2/13/2022       Date of Note:  2/18/2022          Current Antimicrobials:    Prior Antimicrobials:  Atovaquone  azithromycin      Assessment:         Acute hypoxic respiratory failure: Initially requiring BiPAP. Most likely related to volume overload and hypertensive emergency chest x-ray with pulmonary edema. Cannot entirely rule out underlying PJP pneumonia. Cryptococcus Ag negative, CMV PCR negative  HIV/AIDS: On tenofovir, Tivicay, Prezista/ritonavir, 3TC per documentation of prior to admission meds.; based on review of viral loads over the last 2 years it appears as though she become less compliant with her antiretroviral regimen. She is not sure who follows her locally for HIV but had moved from [place], [place] about 1 year ago. Previously on Biktarvy (bictegravir-emtricitibine-TNF)              -1/20/2022 HIV viral load 108,000, absolute CD4 16              -9/28/2020 genotype reviewed-no predicted integrates resistance   - 2/14 Cryptococcal Ag negative, RPR non-reactive, CD4 14, , ,000  Chronic active hepatitis B: Last HBV PCR was 526 on 1/20/2022; hepatitis B E antigen negative on 3/29/2021   - Viral load 380 (2/14/22)   - 2/14 HBsAb +, HBsAg negative, + HBcAb   - HCV PCR negative  End-stage renal disease on hemodialysis  History of syphilis: Last known RPR titer 1.8 about 1 years ago  Reported history of latent TB- quantiferron gold from 9/28/2020 negative  Hx substance abuse:  Prior documentation of cocaine use    Plan:   Discussed with Dr. Camille Faith  Discussed with Dr. Kyrie Phillip    Continue antiretrovirals renally dosed-encourage patient compliance. fungitell pending.   CBC, CMP in am    Continue atovaquone in setting of end-stage renal disease and need for PGP prophylaxis-  Continue azithromycin for MAC prophylaxis given CD4 14     Can schedule as OP in my clinic to follow for HIV management. I left my card on her tray table if she and her daughter decide to transition care to the [place] area for ease of transportation. Encourage compliance with HAART and prophylaxis. DO Clover [facility]  [address] \[facility]\""[address]  Office: [phone]  Mobile/Text: [phone]     Microbiology:   respiratory viral panel: Negative for all pathogens tested    Lines / Catheters:  peripheral    Subjective:   Patient seen and examined at bedside. Seen in HD today. Much calmer today. No new issues reported per nursing staff. No fevers, chills, n/v/d. Denies any more SOB today. Objective:        Visit Vitals  /79 (BP 1 Location: Right upper arm, BP Patient Position: At rest)   Pulse 88   Temp 97.6 °F (36.4 °C)   Resp 18   Ht 5' 1\" (1.549 m)   Wt 51.3 kg (113 lb 1.6 oz)   SpO2 99%   BMI 21.37 kg/m²     Temp (24hrs), Av.1 °F (36.7 °C), Min:97.5 °F (36.4 °C), Max:99 °F (37.2 °C)        General:   awake alert and oriented, chronically ill-appearing   Skin:   no rashes or skin lesions noted on limited exam, dry and warm   HEENT:  No scleral icterus or pallor; oral mucosa moist, lips moist, poor dentition   Lymph Nodes:   not assessed today   Lungs:   non, labored; bilaterally clear to aspiration- no crackles wheezes rales or rhonchi   Heart:  RRR, s1 and s2; no murmurs rubs or gallops; no edema, + pedal pulses   Abdomen:  soft, non-distended, active bowel sounds, non-tender   Genitourinary:  deferred   Extremities:   average muscle tone; no contractures, no joint effusions   Neurologic:  No gross focal motor or sensory abnormalities; CN 2-12 intact; Follows commands. Psychiatric:   appropriate and interactive.          Lab results:    Chemistry  Recent Labs     22  0430 22  0430   GLU 83 76   * 135*   K 5.0 4.5   CL 98* 101   CO2 25 25   BUN 39* 40*   CREA 7.56* 7.30*   CA 8.5 7.7*   AGAP 8 9   BUCR 5* 5*       CBC w/ Diff  Recent Labs     02/18/22  0430 02/16/22  0430   WBC 2.3* 1.9*   RBC 2.96* 3.18*   HGB 8.4* 9.0*   HCT 27.0* 29.4*    166   GRANS 55 49   LYMPH 19* 26   EOS 4 6*       Microbiology  All Micro Results     Procedure Component Value Units Date/Time    CMV BY PCR, QT [[id number]] Collected: 02/14/22 1819    Order Status: Completed Specimen: Serum from Plasma Updated: 02/17/22 1437     CMV IU/mL Negative IU/mL      Comment: (NOTE)  No CMV DNA detected. The quantitative range of this assay is 200 to 1 million IU/mL.   Performed At: [facility] & [address] [id number]  Román Yan MD FA:[id number]          CMV log 10 IU/mL TEST NOT PERFORMED log10 IU/mL      Comment: (NOTE)  Unable to calculate result since non-numeric result obtained for  component test.         CRYPTOCOCCUS AG W/REFLEX TITER [[id number]] Collected: 02/14/22 1819    Order Status: Completed Specimen: Serum from Blood Updated: 02/15/22 1032     Cryptococcus Ag Negative       P.JIROVECI BY PCR [[id number]] Collected: 02/14/22 1500    Order Status: Canceled     RESPIRATORY VIRUS PANEL W/COVID-19, PCR [[id number]] Collected: 02/14/22 0127    Order Status: Completed Specimen: Nasopharyngeal Updated: 02/14/22 0248     Adenovirus Not detected        Coronavirus 229E Not detected        Coronavirus HKU1 Not detected        Coronavirus CVNL63 Not detected        Coronavirus OC43 Not detected        SARS-CoV-2, PCR Not detected        Metapneumovirus Not detected        Rhinovirus and Enterovirus Not detected        Influenza A Not detected        Influenza B Not detected        Parainfluenza 1 Not detected        Parainfluenza 2 Not detected        Parainfluenza 3 Not detected        Parainfluenza virus 4 Not detected        RSV by PCR Not detected        B. parapertussis, PCR Not detected        Bordetella pertussis - PCR Not detected        Chlamydophila pneumoniae DNA, QL, PCR Not detected        Mycoplasma pneumoniae DNA, QL, PCR Not detected              Adenike Burgess,    2/18/2022

## 2022-02-19 LAB
1,3 BETA GLUCAN SER-MCNC: NORMAL PG/ML
HBV CORE AB SERPL QL IA: POSITIVE
HBV CORE IGM SERPL QL IA: NEGATIVE

## 2022-03-14 ENCOUNTER — HOSPITAL ENCOUNTER (EMERGENCY)
Age: 62
Discharge: HOME | End: 2022-03-15
Attending: EMERGENCY MEDICINE
Payer: COMMERCIAL

## 2022-03-14 ENCOUNTER — APPOINTMENT (OUTPATIENT)
Dept: GENERAL RADIOLOGY | Age: 62
End: 2022-03-14
Attending: EMERGENCY MEDICINE
Payer: COMMERCIAL

## 2022-03-14 DIAGNOSIS — Z99.2 ESRD (END STAGE RENAL DISEASE) ON DIALYSIS (HCC): ICD-10-CM

## 2022-03-14 DIAGNOSIS — E87.70 HYPERVOLEMIA, UNSPECIFIED HYPERVOLEMIA TYPE: ICD-10-CM

## 2022-03-14 DIAGNOSIS — R09.02 HYPOXIA: Primary | ICD-10-CM

## 2022-03-14 DIAGNOSIS — N18.6 ESRD (END STAGE RENAL DISEASE) ON DIALYSIS (HCC): ICD-10-CM

## 2022-03-14 PROCEDURE — 94660 CPAP INITIATION&MGMT: CPT

## 2022-03-14 PROCEDURE — 71045 X-RAY EXAM CHEST 1 VIEW: CPT

## 2022-03-14 PROCEDURE — 93005 ELECTROCARDIOGRAM TRACING: CPT

## 2022-03-14 PROCEDURE — 81001 URINALYSIS AUTO W/SCOPE: CPT

## 2022-03-14 PROCEDURE — 74011250637 HC RX REV CODE- 250/637: Performed by: EMERGENCY MEDICINE

## 2022-03-14 RX ORDER — NITROGLYCERIN 0.4 MG/1
0.4 TABLET SUBLINGUAL
Status: COMPLETED | OUTPATIENT
Start: 2022-03-14 | End: 2022-03-14

## 2022-03-14 RX ORDER — FUROSEMIDE 10 MG/ML
80 INJECTION INTRAMUSCULAR; INTRAVENOUS
Status: COMPLETED | OUTPATIENT
Start: 2022-03-14 | End: 2022-03-15

## 2022-03-14 RX ADMIN — Medication 0.4 MG: at 23:00

## 2022-03-15 VITALS
HEART RATE: 74 BPM | SYSTOLIC BLOOD PRESSURE: 151 MMHG | OXYGEN SATURATION: 99 % | TEMPERATURE: 98 F | RESPIRATION RATE: 18 BRPM | DIASTOLIC BLOOD PRESSURE: 90 MMHG

## 2022-03-15 PROBLEM — D64.9 ANEMIA: Status: ACTIVE | Noted: 2022-03-15

## 2022-03-15 PROBLEM — E87.79 CARDIAC VOLUME OVERLOAD: Status: ACTIVE | Noted: 2022-03-15

## 2022-03-15 LAB
ALBUMIN SERPL-MCNC: 3.3 G/DL (ref 3.4–5)
ALBUMIN/GLOB SERPL: 0.8 {RATIO} (ref 0.8–1.7)
ALP SERPL-CCNC: 120 U/L (ref 45–117)
ALT SERPL-CCNC: 25 U/L (ref 13–56)
ANION GAP SERPL CALC-SCNC: 9 MMOL/L (ref 3–18)
APPEARANCE UR: ABNORMAL
AST SERPL-CCNC: 30 U/L (ref 10–38)
ATRIAL RATE: 108 BPM
BACTERIA URNS QL MICRO: ABNORMAL /HPF
BASOPHILS # BLD: 0 K/UL (ref 0–0.1)
BASOPHILS NFR BLD: 0 % (ref 0–2)
BILIRUB SERPL-MCNC: 0.2 MG/DL (ref 0.2–1)
BILIRUB UR QL: NEGATIVE
BNP SERPL-MCNC: ABNORMAL PG/ML (ref 0–900)
BUN SERPL-MCNC: 85 MG/DL (ref 7–18)
BUN/CREAT SERPL: 10 (ref 12–20)
CALCIUM SERPL-MCNC: 8.8 MG/DL (ref 8.5–10.1)
CALCULATED P AXIS, ECG09: 64 DEGREES
CALCULATED R AXIS, ECG10: 4 DEGREES
CALCULATED T AXIS, ECG11: 87 DEGREES
CHLORIDE SERPL-SCNC: 108 MMOL/L (ref 100–111)
CO2 SERPL-SCNC: 20 MMOL/L (ref 21–32)
COLOR UR: YELLOW
CREAT SERPL-MCNC: 8.47 MG/DL (ref 0.6–1.3)
DIAGNOSIS, 93000: NORMAL
DIFFERENTIAL METHOD BLD: ABNORMAL
EOSINOPHIL # BLD: 0.2 K/UL (ref 0–0.4)
EOSINOPHIL NFR BLD: 3 % (ref 0–5)
EPITH CASTS URNS QL MICRO: ABNORMAL /LPF (ref 0–5)
ERYTHROCYTE [DISTWIDTH] IN BLOOD BY AUTOMATED COUNT: 16.4 % (ref 11.6–14.5)
GLOBULIN SER CALC-MCNC: 3.9 G/DL (ref 2–4)
GLUCOSE SERPL-MCNC: 96 MG/DL (ref 74–99)
GLUCOSE UR STRIP.AUTO-MCNC: 100 MG/DL
HCT VFR BLD AUTO: 22.8 % (ref 35–45)
HGB BLD-MCNC: 7.1 G/DL (ref 12–16)
HGB UR QL STRIP: ABNORMAL
IMM GRANULOCYTES # BLD AUTO: 0.1 K/UL (ref 0–0.04)
IMM GRANULOCYTES NFR BLD AUTO: 1 % (ref 0–0.5)
KETONES UR QL STRIP.AUTO: NEGATIVE MG/DL
LEUKOCYTE ESTERASE UR QL STRIP.AUTO: NEGATIVE
LIPASE SERPL-CCNC: 726 U/L (ref 73–393)
LYMPHOCYTES # BLD: 0.5 K/UL (ref 0.9–3.6)
LYMPHOCYTES NFR BLD: 7 % (ref 21–52)
MAGNESIUM SERPL-MCNC: 1.9 MG/DL (ref 1.6–2.6)
MCH RBC QN AUTO: 30.3 PG (ref 24–34)
MCHC RBC AUTO-ENTMCNC: 31.1 G/DL (ref 31–37)
MCV RBC AUTO: 97.4 FL (ref 78–100)
MONOCYTES # BLD: 0.5 K/UL (ref 0.05–1.2)
MONOCYTES NFR BLD: 7 % (ref 3–10)
NEUTS SEG # BLD: 6.3 K/UL (ref 1.8–8)
NEUTS SEG NFR BLD: 82 % (ref 40–73)
NITRITE UR QL STRIP.AUTO: NEGATIVE
NRBC # BLD: 0 K/UL (ref 0–0.01)
NRBC BLD-RTO: 0 PER 100 WBC
P-R INTERVAL, ECG05: 162 MS
PH UR STRIP: 8.5 [PH] (ref 5–8)
PLATELET # BLD AUTO: 209 K/UL (ref 135–420)
PMV BLD AUTO: 10.7 FL (ref 9.2–11.8)
POTASSIUM SERPL-SCNC: 4.7 MMOL/L (ref 3.5–5.5)
PROT SERPL-MCNC: 7.2 G/DL (ref 6.4–8.2)
PROT UR STRIP-MCNC: >1000 MG/DL
Q-T INTERVAL, ECG07: 358 MS
QRS DURATION, ECG06: 82 MS
QTC CALCULATION (BEZET), ECG08: 479 MS
RBC # BLD AUTO: 2.34 M/UL (ref 4.2–5.3)
RBC #/AREA URNS HPF: ABNORMAL /HPF (ref 0–5)
SODIUM SERPL-SCNC: 137 MMOL/L (ref 136–145)
SP GR UR REFRACTOMETRY: 1.01 (ref 1–1.03)
TROPONIN-HIGH SENSITIVITY: 13 NG/L (ref 0–54)
UROBILINOGEN UR QL STRIP.AUTO: 0.2 EU/DL (ref 0.2–1)
VENTRICULAR RATE, ECG03: 108 BPM
WBC # BLD AUTO: 7.7 K/UL (ref 4.6–13.2)
WBC URNS QL MICRO: ABNORMAL /HPF (ref 0–4)

## 2022-03-15 PROCEDURE — 90935 HEMODIALYSIS ONE EVALUATION: CPT

## 2022-03-15 PROCEDURE — 83735 ASSAY OF MAGNESIUM: CPT

## 2022-03-15 PROCEDURE — 96374 THER/PROPH/DIAG INJ IV PUSH: CPT

## 2022-03-15 PROCEDURE — 74011250636 HC RX REV CODE- 250/636: Performed by: INTERNAL MEDICINE

## 2022-03-15 PROCEDURE — 85025 COMPLETE CBC W/AUTO DIFF WBC: CPT

## 2022-03-15 PROCEDURE — 80053 COMPREHEN METABOLIC PANEL: CPT

## 2022-03-15 PROCEDURE — 83880 ASSAY OF NATRIURETIC PEPTIDE: CPT

## 2022-03-15 PROCEDURE — 94660 CPAP INITIATION&MGMT: CPT

## 2022-03-15 PROCEDURE — 74011250636 HC RX REV CODE- 250/636: Performed by: EMERGENCY MEDICINE

## 2022-03-15 PROCEDURE — 96375 TX/PRO/DX INJ NEW DRUG ADDON: CPT

## 2022-03-15 PROCEDURE — 83690 ASSAY OF LIPASE: CPT

## 2022-03-15 PROCEDURE — 99285 EMERGENCY DEPT VISIT HI MDM: CPT

## 2022-03-15 PROCEDURE — 84484 ASSAY OF TROPONIN QUANT: CPT

## 2022-03-15 RX ORDER — ALBUMIN HUMAN 250 G/1000ML
25 SOLUTION INTRAVENOUS
Status: DISCONTINUED | OUTPATIENT
Start: 2022-03-15 | End: 2022-03-15 | Stop reason: HOSPADM

## 2022-03-15 RX ORDER — HEPARIN SODIUM 1000 [USP'U]/ML
1000 INJECTION, SOLUTION INTRAVENOUS; SUBCUTANEOUS ONCE
Status: COMPLETED | OUTPATIENT
Start: 2022-03-15 | End: 2022-03-15

## 2022-03-15 RX ORDER — HEPARIN SODIUM 1000 [USP'U]/ML
5000 INJECTION, SOLUTION INTRAVENOUS; SUBCUTANEOUS
Status: DISCONTINUED | OUTPATIENT
Start: 2022-03-15 | End: 2022-03-15 | Stop reason: HOSPADM

## 2022-03-15 RX ORDER — FENTANYL CITRATE 50 UG/ML
25 INJECTION, SOLUTION INTRAMUSCULAR; INTRAVENOUS
Status: COMPLETED | OUTPATIENT
Start: 2022-03-15 | End: 2022-03-15

## 2022-03-15 RX ORDER — DOXERCALCIFEROL 4 UG/2ML
2 INJECTION INTRAVENOUS
Status: DISCONTINUED | OUTPATIENT
Start: 2022-03-15 | End: 2022-03-15 | Stop reason: HOSPADM

## 2022-03-15 RX ADMIN — FUROSEMIDE 80 MG: 10 INJECTION, SOLUTION INTRAMUSCULAR; INTRAVENOUS at 03:25

## 2022-03-15 RX ADMIN — HEPARIN SODIUM 5000 UNITS: 1000 INJECTION, SOLUTION INTRAVENOUS; SUBCUTANEOUS at 13:44

## 2022-03-15 RX ADMIN — DOXERCALCIFEROL 2 MCG: 4 INJECTION, SOLUTION INTRAVENOUS at 12:25

## 2022-03-15 RX ADMIN — FENTANYL CITRATE 25 MCG: 50 INJECTION INTRAMUSCULAR; INTRAVENOUS at 03:26

## 2022-03-15 RX ADMIN — HEPARIN SODIUM 1000 UNITS: 1000 INJECTION, SOLUTION INTRAVENOUS; SUBCUTANEOUS at 10:42

## 2022-03-15 NOTE — PROGRESS NOTES
03/14/22 2304   Oxygen Therapy   O2 Sat (%) 97 %   Pulse via Oximetry 113 beats per minute   O2 Device BIPAP   FIO2 (%) 50 %   Respiratory   Respiratory (WDL) X   Respiratory Pattern Tachypneic;Labored   Chest/Tracheal Assessment Chest expansion, symmetrical   CPAP/BIPAP   CPAP/BIPAP Start/Stop On   Device Mode BIPAP;S/T   $$ Bipap Daily Yes   Bio-Med ID # [id number]   Mask Type and Size Full face;Small   Skin Condition intact   PIP Observed 18 cm H20   IPAP (cm H2O) 16 cm H2O   EPAP (cm H2O) 8 cm H2O   Inspiratory Time (sec) 1 seconds   Vt Spont (ml) 460 ml   Ve Observed (l/min) 15.4 l/min   Backup Rate 8   Total RR (Spontaneous) 29 breaths per minute   Insp Rise Time (sec) 3   Leak (Estimated) 30 L/min   Pt's Home Machine No   Biomedical Check Performed Yes   Settings Verified Yes   Placed on Bipap due to increase wob and labored. 0585- Bipap trial off and placed on 4L nasal cannula. 4894- Pt c/o sob after switching to nasal cannula. Placed pt back to Bipap. O2 wean to 30%fio2.

## 2022-03-15 NOTE — ED PROVIDER NOTES
EMERGENCY DEPARTMENT HISTORY AND PHYSICAL EXAM    10:58 PM  Date: (Not on file)  Patient Name: Aldair Fernandez    History of Presenting Illness     No chief complaint on file. History Provided By: Patient    HPI: Aldair Fernandez is a 63 y.o. female with history of multiple medical problems as below including HIV and end-stage renal disease on dialysis, schizophrenia. Patient was brought in by ambulance for shortness of breath. Patient states she had not had dialysis since last week. Complaining of shortness of breath that started today. She was diaphoretic, tachypneic and agitated not tolerating BiPAP so they placed her on a nonrebreather. Roommate saturation was in the 70s. Patient denies chest pain, vomiting or diarrhea. History of fever or chills. Location:  Severity:  Timing/course: Onset/Duration:     PCP: Jordi Jonas MD    Past History     Past Medical History:  Past Medical History:   Diagnosis Date    Acute kidney injury ([address]     AIDS [facility]ac[facility] deficiency syndrome) [facility]     Anemia     Esophageal candidiasis ([address]     ESRD (end stage renal disease) ([address]     HCV (hepatitis C virus)     Hepatitis B     Hepatitis C     HIV (human immunodeficiency virus infection) ([address]     HIV (human immunodeficiency virus infection) ([address]     Late latent syphilis     Pulmonary TB     Schizoaffective disorder ([address]     Substance abuse ([facility]        Past Surgical History:  Past Surgical History:   Procedure Laterality Date    HX GYN      HX NEPHRECTOMY      left kidney removed at age 17   [de-identified] NEPHRECTOMY         Family History:  No family history on file. Social History:  Social History     Tobacco Use    Smoking status: Not on file    Smokeless tobacco: Not on file   Substance Use Topics    Alcohol use: Not on file    Drug use: Not on file       Allergies:   Allergies   Allergen Reactions    Ceftriaxone Itching     Rxn in ER recorded 2007      Aspirin Hives    Pcn [Penicillins] Hives       Review of Systems   Review of Systems     Physical Exam     No data found. Physical Exam  Vitals and nursing note reviewed. Constitutional:       General: She is in acute distress. Appearance: She is ill-appearing and diaphoretic. HENT:      Head: Normocephalic and atraumatic. Eyes:      Extraocular Movements: Extraocular movements intact. Cardiovascular:      Rate and Rhythm: Tachycardia present. Pulses: Normal pulses. Pulmonary:      Effort: Respiratory distress present. Breath sounds: Rhonchi and rales present. Abdominal:      Palpations: Abdomen is soft. Tenderness: There is no abdominal tenderness. Musculoskeletal:         General: No deformity. Cervical back: Neck supple. Neurological:      Mental Status: She is alert and oriented to person, place, and time. Mental status is at baseline. Psychiatric:         Mood and Affect: Mood normal.         Behavior: Behavior normal.         Diagnostic Study Results     Labs -  No results found for this or any previous visit (from the past 12 hour(s)). Radiologic Studies -   No results found. Medical Decision Making     ED Course: Progress Notes, Reevaluation, and Consults:    10:58 PM Initial assessment performed. The patients presenting problems have been discussed, and they/their family are in agreement with the care plan formulated and outlined with them. I have encouraged them to ask questions as they arise throughout their visit. Provider Notes (Medical Decision Making): 17-year-old female with history of end-stage renal disease and HIV brought in by ambulance in respiratory distress. Missed dialysis, unknown last dialysis. Patient has history of psychiatric disorder and a poor historian. She is diaphoretic and tachypneic with diffuse rales. She was immediately placed on BiPAP upon arrival with significant improvement.   She was initially slightly agitated but got better after the BiPAP. Screening labs were ordered including cardiac work-up. Patient was afebrile and did not show signs of infection so no septic work-up was done. Chest x-ray is consistent with fluid overload as well as her labs. Hemoglobin slowly downtrending, likely related to chronic kidney disease. EKG is unchanged from prior  Troponin is 13, will require 2-hour repeat, patient lost her IV, can be repeated after dialysis. Patient was given sublingual nitro and Lasix and started diuresing well however failed a trial off of BiPAP, tolerated the nasal cannula for less than 2 minutes then was placed back on BiPAP. I discussed the case with her nephrologist Dr. Nuha Nathan, will dialyze in the morning. He recommended admission. Discussed the case with the hospitalist Dr. Mariann Greenberg, we both agreed on the plan that the patient should be dialyzed first since her symptoms are only related to missing dialysis and fluid overload, to be reevaluated after dialysis and if she still requiring oxygenation then she can be admitted. Procedures:     Critical Care Time: Upon my evaluation, this patient had a high probability of imminent or life-threatening deterioration due to respiratory distress, which required my direct attention, intervention, and personal management. I have personally provided 60 minutes of critical care time exclusive of time spent on separately billable procedures. Time includes review of laboratory data, radiology results, discussion with consultants, and monitoring for potential decompensation. Interventions were performed as documented above. James Steen MD  6:28 AM        Vital Signs-Reviewed the patient's vital signs. Reviewed pt's pulse ox reading. EKG: Interpreted by the EP.    Time Interpreted:    Rate:    Rhythm:    Interpretation:   Comparison:     Records Reviewed: Nursing Notes, Old Medical Records, Previous electrocardiograms, Previous Radiology Studies and Previous Laboratory Studies (Time of Review: 10:58 PM)  -I am the first provider for this patient.  -I reviewed the vital signs, available nursing notes, past medical history, past surgical history, family history and social history. Current Facility-Administered Medications   Medication Dose Route Frequency Provider Last Rate Last Admin    nitroglycerin (NITROSTAT) tablet 0.4 mg  0.4 mg SubLINGual NOW James Steen MD         Current Outpatient Medications   Medication Sig Dispense Refill    amLODIPine (NORVASC) 10 mg tablet Take 1 Tablet by mouth daily. 30 Tablet 0    atovaquone (MEPRON) 750 mg/5 mL suspension Take 5 mL by mouth daily (with breakfast) for 30 days. 150 mL 0    azithromycin (ZITHROMAX) 600 mg tablet Take 2 Tablets by mouth every seven (7) days. 30 Tablet 0    carvediloL (COREG) 6.25 mg tablet Take 1 Tablet by mouth two (2) times daily (with meals). 60 Tablet 0    losartan (COZAAR) 100 mg tablet Take 1 Tablet by mouth daily. 30 Tablet 0        Clinical Impression     Clinical Impression: No diagnosis found. Disposition: 6:28 AM : Pt care transferred to Dr. Brian Wynne provider. History of patient complaint(s), available diagnostic reports and current treatment plan has been discussed thoroughly. Bedside rounding on patient occured : N/A . Intended disposition of patient : TBD  Pending diagnostics reports and/or labs (please list): Dialysis then reevaluation    Dr. Brennan Wisdom assistance in completion of this plan is greatly appreciated but it should be noted that I will be the provider of record for this patient. This note was dictated utilizing voice recognition software which may lead to typographical errors. I apologize in advance if the situation occurs. If questions arise please do not hesitate to contact me or call our department.     Jaydon Maradiaga MD  10:58 PM

## 2022-03-15 NOTE — ED NOTES
Patient turned over to me she is a 82-year-old female presents with shortness of breath on BiPAP pending dialysis. We lost her IV access so I placed a ultrasound-guided right peripheral 95-AFRJP AC without complication.   She is currently resting comfortably speaking in full sentences pending dialysis    3:22 PM pt patient feeling better she is upset that she is peed on her self multiple times otherwise no complaints at this time

## 2022-03-15 NOTE — PROGRESS NOTES
Progress Note    Cm Dempsey  63 y.o. Admit Date: 3/14/2022  Active Problems:    ESRD (end stage renal disease) on dialysis ([facility] (10/25/2021) POA: Yes      Hypertension (10/25/2021) POA: Yes      Respiratory failure ([facility] (10/25/2021) POA: Yes      AIDS (acquired immune deficiency syndrome) ([facility] (11/8/2021) POA: Unknown      Secondary hyperparathyroidism of renal origin ([facility] (2/14/2022) POA: Yes      Cardiac volume overload (3/15/2022) POA: Unknown      Anemia (3/15/2022) POA: Unknown            Subjective:     Patient admitted with acute respiratory Distress with the sign of Volume Overload,initially required BIPAP,subsequently settled with Nasal cannula oxygen. Arranged Urgent Dialysis & seen during Dialysis. Tolerating fluid remoal      A comprehensive review of systems was negative except for that written in the History of Present Illness. Objective:     Visit Vitals  BP (!) 164/90   Pulse 89   Temp 98 °F (36.7 °C) (Temporal)   Resp 20   SpO2 98%       No intake or output data in the 24 hours ending 03/15/22 1232    Current Facility-Administered Medications   Medication Dose Route Frequency Provider Last Rate Last Admin    heparin (porcine) 1,000 unit/mL injection 5,000 Units  5,000 Units IntraCATHeter DIALYSIS PRARTURO Murcia MD        albumin human 25% (BUMINATE) solution 25 g  25 g IntraVENous DIALYSIS PRN Alma Murcia MD        epoetin romelia-epbx (RETACRIT) injection 10,000 Units  10,000 Units SubCUTAneous Q BASIM SPENCER & KARY Murcia MD        doxercalciferoL (HECTOROL) 4 mcg/2 mL injection 2 mcg  2 mcg IntraVENous DIALYSIS BASIM SPENCER & KARY Murcia MD   2 mcg at 03/15/22 1225     Current Outpatient Medications   Medication Sig Dispense Refill    amLODIPine (NORVASC) 10 mg tablet Take 1 Tablet by mouth daily. 30 Tablet 0    atovaquone (MEPRON) 750 mg/5 mL suspension Take 5 mL by mouth daily (with breakfast) for 30 days.  150 mL 0    azithromycin (ZITHROMAX) 600 mg tablet Take 2 Tablets by mouth every seven (7) days. 30 Tablet 0    carvediloL (COREG) 6.25 mg tablet Take 1 Tablet by mouth two (2) times daily (with meals). 60 Tablet 0    losartan (COZAAR) 100 mg tablet Take 1 Tablet by mouth daily. 30 Tablet 0        Physical Exam:     Physical Exam:   General:  Alert, cooperative, no distress, appears stated age. Neck: Supple, symmetrical, trachea midline, no adenopathy, thyroid: no enlargement/tenderness/nodules, no carotid bruit and no JVD. Lungs:   Clear to auscultation bilaterally. Heart:  Regular rate and rhythm, S1, S2 normal, no murmur, click, rub or gallop. Abdomen:   Soft, non-tender. Bowel sounds normal. No masses,  No organomegaly. Extremities: Extremities normal, atraumatic, no cyanosis or edema, has Right IJ catheter,has Left arm AVG with elevated Velocities   Skin: Skin color, texture, turgor normal. No rashes or lesions         Data Review:    CBC w/Diff    Recent Labs     03/15/22  0105   WBC 7.7   RBC 2.34*   HGB 7.1*   HCT 22.8*   MCV 97.4   MCH 30.3   MCHC 31.1   RDW 16.4*    Recent Labs     03/15/22  0105   MONOS 7   EOS 3   BASOS 0   RDW 16.4*        Comprehensive Metabolic Profile    Recent Labs     03/15/22  0105      K 4.7      CO2 20*   BUN 85*   CREA 8.47*    Recent Labs     03/15/22  0105   CA 8.8   ALB 3.3*   TP 7.2   TBILI 0.2          Narrative & Impression   EXAM: PORTABLE CHEST 2344 hours     CLINICAL HISTORY/INDICATION: sob , difficulty breathing, history of HIV and  pulmonary TB         COMPARISON: Chest x-ray 2/13/2022, 10/24/2021.     TECHNIQUE: Single AP view     FINDINGS:      Double-lumen right jugular approach central catheter terminates at the distal  superior vena cava. The heart is top normal in size. Increased central hazy  opacities in the perivascular regions with fuzziness of the pulmonary arteries. The costophrenic angles are sharp. .     IMPRESSION     Mild pulmonary edema                   Impression:       Active Hospital Problems    Diagnosis Date Noted    Cardiac volume overload 03/15/2022    Anemia 03/15/2022    Secondary hyperparathyroidism of renal origin ([facility] 02/14/2022    AIDS (acquired immune deficiency syndrome) ([facility] 11/08/2021    ESRD (end stage renal disease) on dialysis ([facility] 10/25/2021    Respiratory failure ([facility] 10/25/2021    Hypertension 10/25/2021            Plan:     Using 2 K,2.5 ca bath, UF goal 3 Kg in 3 hours, Retacrit & Hectorol as ordered.         Virgie Young MD

## 2022-03-15 NOTE — ED NOTES
Bedside, Verbal and Written shift change report given to [address] (oncoming nurse) by Quincy STAPLETON(offgoing nurse). Report included the following information SBAR, Kardex, and MAR. Hourly rounding and  chart checks completed.

## 2022-03-21 ENCOUNTER — HOSPITAL ENCOUNTER (OUTPATIENT)
Age: 62
Setting detail: OBSERVATION
Discharge: HOME HEALTH | End: 2022-03-23
Attending: STUDENT IN AN ORGANIZED HEALTH CARE EDUCATION/TRAINING PROGRAM | Admitting: INTERNAL MEDICINE
Payer: MEDICAID

## 2022-03-21 ENCOUNTER — APPOINTMENT (OUTPATIENT)
Dept: GENERAL RADIOLOGY | Age: 62
End: 2022-03-21
Attending: STUDENT IN AN ORGANIZED HEALTH CARE EDUCATION/TRAINING PROGRAM
Payer: MEDICAID

## 2022-03-21 DIAGNOSIS — N18.6 ESRD (END STAGE RENAL DISEASE) ON DIALYSIS (HCC): ICD-10-CM

## 2022-03-21 DIAGNOSIS — I16.0 HYPERTENSIVE URGENCY: ICD-10-CM

## 2022-03-21 DIAGNOSIS — J96.01 ACUTE RESPIRATORY FAILURE WITH HYPOXIA (HCC): Primary | ICD-10-CM

## 2022-03-21 DIAGNOSIS — J81.0 ACUTE PULMONARY EDEMA (HCC): ICD-10-CM

## 2022-03-21 DIAGNOSIS — Z99.2 ESRD (END STAGE RENAL DISEASE) ON DIALYSIS (HCC): ICD-10-CM

## 2022-03-21 LAB
ALBUMIN SERPL-MCNC: 3.9 G/DL (ref 3.4–5)
ALBUMIN/GLOB SERPL: 0.8 {RATIO} (ref 0.8–1.7)
ALP SERPL-CCNC: 138 U/L (ref 45–117)
ALT SERPL-CCNC: 21 U/L (ref 13–56)
ANION GAP SERPL CALC-SCNC: 14 MMOL/L (ref 3–18)
AST SERPL-CCNC: 27 U/L (ref 10–38)
BASOPHILS # BLD: 0.1 K/UL (ref 0–0.1)
BASOPHILS NFR BLD: 1 % (ref 0–2)
BILIRUB SERPL-MCNC: 0.4 MG/DL (ref 0.2–1)
BUN SERPL-MCNC: 58 MG/DL (ref 7–18)
BUN/CREAT SERPL: 6 (ref 12–20)
CALCIUM SERPL-MCNC: 8.3 MG/DL (ref 8.5–10.1)
CHLORIDE SERPL-SCNC: 106 MMOL/L (ref 100–111)
CO2 SERPL-SCNC: 17 MMOL/L (ref 21–32)
CREAT SERPL-MCNC: 9.68 MG/DL (ref 0.6–1.3)
DIFFERENTIAL METHOD BLD: ABNORMAL
EOSINOPHIL # BLD: 0.5 K/UL (ref 0–0.4)
EOSINOPHIL NFR BLD: 7 % (ref 0–5)
ERYTHROCYTE [DISTWIDTH] IN BLOOD BY AUTOMATED COUNT: 16.3 % (ref 11.6–14.5)
GLOBULIN SER CALC-MCNC: 5 G/DL (ref 2–4)
GLUCOSE SERPL-MCNC: 186 MG/DL (ref 74–99)
HCT VFR BLD AUTO: 28.8 % (ref 35–45)
HGB BLD-MCNC: 8.8 G/DL (ref 12–16)
IMM GRANULOCYTES # BLD AUTO: 0 K/UL (ref 0–0.04)
IMM GRANULOCYTES NFR BLD AUTO: 0 % (ref 0–0.5)
LYMPHOCYTES # BLD: 2.8 K/UL (ref 0.9–3.6)
LYMPHOCYTES NFR BLD: 39 % (ref 21–52)
MAGNESIUM SERPL-MCNC: 2.1 MG/DL (ref 1.6–2.6)
MCH RBC QN AUTO: 31 PG (ref 24–34)
MCHC RBC AUTO-ENTMCNC: 30.6 G/DL (ref 31–37)
MCV RBC AUTO: 101.4 FL (ref 78–100)
MONOCYTES # BLD: 0.9 K/UL (ref 0.05–1.2)
MONOCYTES NFR BLD: 13 % (ref 3–10)
NEUTS SEG # BLD: 2.9 K/UL (ref 1.8–8)
NEUTS SEG NFR BLD: 40 % (ref 40–73)
NRBC # BLD: 0 K/UL (ref 0–0.01)
NRBC BLD-RTO: 0 PER 100 WBC
PLATELET # BLD AUTO: 353 K/UL (ref 135–420)
PMV BLD AUTO: 9.4 FL (ref 9.2–11.8)
POTASSIUM SERPL-SCNC: 3.9 MMOL/L (ref 3.5–5.5)
PROT SERPL-MCNC: 8.9 G/DL (ref 6.4–8.2)
RBC # BLD AUTO: 2.84 M/UL (ref 4.2–5.3)
SODIUM SERPL-SCNC: 137 MMOL/L (ref 136–145)
TROPONIN-HIGH SENSITIVITY: 12 NG/L (ref 0–54)
WBC # BLD AUTO: 7.2 K/UL (ref 4.6–13.2)

## 2022-03-21 PROCEDURE — 80053 COMPREHEN METABOLIC PANEL: CPT

## 2022-03-21 PROCEDURE — 74011250636 HC RX REV CODE- 250/636: Performed by: STUDENT IN AN ORGANIZED HEALTH CARE EDUCATION/TRAINING PROGRAM

## 2022-03-21 PROCEDURE — 74011000250 HC RX REV CODE- 250: Performed by: STUDENT IN AN ORGANIZED HEALTH CARE EDUCATION/TRAINING PROGRAM

## 2022-03-21 PROCEDURE — 71045 X-RAY EXAM CHEST 1 VIEW: CPT

## 2022-03-21 PROCEDURE — 96375 TX/PRO/DX INJ NEW DRUG ADDON: CPT

## 2022-03-21 PROCEDURE — 74011250637 HC RX REV CODE- 250/637: Performed by: STUDENT IN AN ORGANIZED HEALTH CARE EDUCATION/TRAINING PROGRAM

## 2022-03-21 PROCEDURE — 84484 ASSAY OF TROPONIN QUANT: CPT

## 2022-03-21 PROCEDURE — 93005 ELECTROCARDIOGRAM TRACING: CPT

## 2022-03-21 PROCEDURE — 94660 CPAP INITIATION&MGMT: CPT

## 2022-03-21 PROCEDURE — 85025 COMPLETE CBC W/AUTO DIFF WBC: CPT

## 2022-03-21 PROCEDURE — 99285 EMERGENCY DEPT VISIT HI MDM: CPT

## 2022-03-21 PROCEDURE — 96365 THER/PROPH/DIAG IV INF INIT: CPT

## 2022-03-21 PROCEDURE — 96372 THER/PROPH/DIAG INJ SC/IM: CPT

## 2022-03-21 PROCEDURE — 83735 ASSAY OF MAGNESIUM: CPT

## 2022-03-21 RX ORDER — LORAZEPAM 2 MG/ML
1 INJECTION INTRAMUSCULAR
Status: COMPLETED | OUTPATIENT
Start: 2022-03-21 | End: 2022-03-21

## 2022-03-21 RX ORDER — LORAZEPAM 2 MG/ML
INJECTION INTRAMUSCULAR
Status: DISPENSED
Start: 2022-03-21 | End: 2022-03-22

## 2022-03-21 RX ORDER — NITROGLYCERIN 40 MG/100ML
0-200 INJECTION INTRAVENOUS
Status: DISCONTINUED | OUTPATIENT
Start: 2022-03-21 | End: 2022-03-22

## 2022-03-21 RX ADMIN — LORAZEPAM 1 MG: 2 INJECTION INTRAMUSCULAR; INTRAVENOUS at 23:26

## 2022-03-21 RX ADMIN — NITROGLYCERIN 10 MCG/MIN: 40 INJECTION INTRAVENOUS at 23:31

## 2022-03-21 RX ADMIN — NITROGLYCERIN 1 INCH: 20 OINTMENT TOPICAL at 21:39

## 2022-03-22 PROBLEM — I16.0 HYPERTENSIVE URGENCY: Status: ACTIVE | Noted: 2022-03-22

## 2022-03-22 PROBLEM — I10 HYPERTENSION: Status: RESOLVED | Noted: 2021-10-25 | Resolved: 2022-03-22

## 2022-03-22 PROBLEM — R09.02 HYPOXIA: Status: RESOLVED | Noted: 2021-10-25 | Resolved: 2022-03-22

## 2022-03-22 PROBLEM — J96.00 RESPIRATORY FAILURE, ACUTE (HCC): Status: ACTIVE | Noted: 2022-03-22

## 2022-03-22 PROBLEM — J81.1 PULMONARY EDEMA: Status: ACTIVE | Noted: 2022-03-22

## 2022-03-22 LAB
ATRIAL RATE: 133 BPM
CALCULATED P AXIS, ECG09: 58 DEGREES
CALCULATED R AXIS, ECG10: 5 DEGREES
CALCULATED T AXIS, ECG11: 97 DEGREES
DIAGNOSIS, 93000: NORMAL
HBA1C MFR BLD: <3.8 % (ref 4.2–5.6)
IRON SATN MFR SERPL: 9 % (ref 20–50)
IRON SERPL-MCNC: 31 UG/DL (ref 50–175)
P-R INTERVAL, ECG05: 124 MS
Q-T INTERVAL, ECG07: 378 MS
QRS DURATION, ECG06: 68 MS
QTC CALCULATION (BEZET), ECG08: 562 MS
TIBC SERPL-MCNC: 341 UG/DL (ref 250–450)
VENTRICULAR RATE, ECG03: 133 BPM

## 2022-03-22 PROCEDURE — 2709999900 HC NON-CHARGEABLE SUPPLY

## 2022-03-22 PROCEDURE — 94762 N-INVAS EAR/PLS OXIMTRY CONT: CPT

## 2022-03-22 PROCEDURE — 90935 HEMODIALYSIS ONE EVALUATION: CPT

## 2022-03-22 PROCEDURE — 74011250636 HC RX REV CODE- 250/636: Performed by: STUDENT IN AN ORGANIZED HEALTH CARE EDUCATION/TRAINING PROGRAM

## 2022-03-22 PROCEDURE — 96374 THER/PROPH/DIAG INJ IV PUSH: CPT

## 2022-03-22 PROCEDURE — 99233 SBSQ HOSP IP/OBS HIGH 50: CPT | Performed by: FAMILY MEDICINE

## 2022-03-22 PROCEDURE — 74011250636 HC RX REV CODE- 250/636: Performed by: INTERNAL MEDICINE

## 2022-03-22 PROCEDURE — 96372 THER/PROPH/DIAG INJ SC/IM: CPT

## 2022-03-22 PROCEDURE — 65270000029 HC RM PRIVATE

## 2022-03-22 PROCEDURE — 99221 1ST HOSP IP/OBS SF/LOW 40: CPT | Performed by: INTERNAL MEDICINE

## 2022-03-22 PROCEDURE — 74011250637 HC RX REV CODE- 250/637: Performed by: FAMILY MEDICINE

## 2022-03-22 PROCEDURE — 96375 TX/PRO/DX INJ NEW DRUG ADDON: CPT

## 2022-03-22 PROCEDURE — G0378 HOSPITAL OBSERVATION PER HR: HCPCS

## 2022-03-22 PROCEDURE — 74011000250 HC RX REV CODE- 250: Performed by: INTERNAL MEDICINE

## 2022-03-22 PROCEDURE — 83540 ASSAY OF IRON: CPT

## 2022-03-22 PROCEDURE — 83036 HEMOGLOBIN GLYCOSYLATED A1C: CPT

## 2022-03-22 PROCEDURE — 74011250637 HC RX REV CODE- 250/637: Performed by: INTERNAL MEDICINE

## 2022-03-22 PROCEDURE — 36415 COLL VENOUS BLD VENIPUNCTURE: CPT

## 2022-03-22 RX ORDER — CARVEDILOL 6.25 MG/1
6.25 TABLET ORAL 2 TIMES DAILY WITH MEALS
Status: DISCONTINUED | OUTPATIENT
Start: 2022-03-22 | End: 2022-03-23 | Stop reason: HOSPADM

## 2022-03-22 RX ORDER — SODIUM CHLORIDE 0.9 % (FLUSH) 0.9 %
5-40 SYRINGE (ML) INJECTION EVERY 8 HOURS
Status: DISCONTINUED | OUTPATIENT
Start: 2022-03-22 | End: 2022-03-23 | Stop reason: HOSPADM

## 2022-03-22 RX ORDER — ACETAMINOPHEN 650 MG/1
650 SUPPOSITORY RECTAL
Status: DISCONTINUED | OUTPATIENT
Start: 2022-03-22 | End: 2022-03-23 | Stop reason: HOSPADM

## 2022-03-22 RX ORDER — ONDANSETRON 2 MG/ML
4 INJECTION INTRAMUSCULAR; INTRAVENOUS
Status: DISCONTINUED | OUTPATIENT
Start: 2022-03-22 | End: 2022-03-23 | Stop reason: HOSPADM

## 2022-03-22 RX ORDER — FENTANYL CITRATE 50 UG/ML
50 INJECTION, SOLUTION INTRAMUSCULAR; INTRAVENOUS
Status: COMPLETED | OUTPATIENT
Start: 2022-03-22 | End: 2022-03-22

## 2022-03-22 RX ORDER — ATOVAQUONE 750 MG/5ML
750 SUSPENSION ORAL
Status: DISCONTINUED | OUTPATIENT
Start: 2022-03-22 | End: 2022-03-23 | Stop reason: HOSPADM

## 2022-03-22 RX ORDER — HYDRALAZINE HYDROCHLORIDE 20 MG/ML
20 INJECTION INTRAMUSCULAR; INTRAVENOUS
Status: DISCONTINUED | OUTPATIENT
Start: 2022-03-22 | End: 2022-03-23 | Stop reason: HOSPADM

## 2022-03-22 RX ORDER — ONDANSETRON 4 MG/1
4 TABLET, ORALLY DISINTEGRATING ORAL
Status: DISCONTINUED | OUTPATIENT
Start: 2022-03-22 | End: 2022-03-23 | Stop reason: HOSPADM

## 2022-03-22 RX ORDER — AZITHROMYCIN 600 MG/1
1200 TABLET, FILM COATED ORAL
Status: DISCONTINUED | OUTPATIENT
Start: 2022-03-22 | End: 2022-03-23 | Stop reason: HOSPADM

## 2022-03-22 RX ORDER — ONDANSETRON 2 MG/ML
4 INJECTION INTRAMUSCULAR; INTRAVENOUS ONCE
Status: COMPLETED | OUTPATIENT
Start: 2022-03-22 | End: 2022-03-22

## 2022-03-22 RX ORDER — ACETAMINOPHEN 325 MG/1
650 TABLET ORAL
Status: DISCONTINUED | OUTPATIENT
Start: 2022-03-22 | End: 2022-03-23 | Stop reason: HOSPADM

## 2022-03-22 RX ORDER — HEPARIN SODIUM 1000 [USP'U]/ML
5000 INJECTION, SOLUTION INTRAVENOUS; SUBCUTANEOUS
Status: DISCONTINUED | OUTPATIENT
Start: 2022-03-22 | End: 2022-03-23 | Stop reason: HOSPADM

## 2022-03-22 RX ORDER — LOSARTAN POTASSIUM 50 MG/1
100 TABLET ORAL DAILY
Status: DISCONTINUED | OUTPATIENT
Start: 2022-03-22 | End: 2022-03-23 | Stop reason: HOSPADM

## 2022-03-22 RX ORDER — AMLODIPINE BESYLATE 10 MG/1
10 TABLET ORAL DAILY
Status: DISCONTINUED | OUTPATIENT
Start: 2022-03-22 | End: 2022-03-23 | Stop reason: HOSPADM

## 2022-03-22 RX ORDER — HYDROCODONE BITARTRATE AND ACETAMINOPHEN 5; 325 MG/1; MG/1
1-2 TABLET ORAL
Status: DISCONTINUED | OUTPATIENT
Start: 2022-03-22 | End: 2022-03-23 | Stop reason: HOSPADM

## 2022-03-22 RX ORDER — POLYETHYLENE GLYCOL 3350 17 G/17G
17 POWDER, FOR SOLUTION ORAL DAILY PRN
Status: DISCONTINUED | OUTPATIENT
Start: 2022-03-22 | End: 2022-03-23 | Stop reason: HOSPADM

## 2022-03-22 RX ORDER — SODIUM CHLORIDE 0.9 % (FLUSH) 0.9 %
5-40 SYRINGE (ML) INJECTION AS NEEDED
Status: DISCONTINUED | OUTPATIENT
Start: 2022-03-22 | End: 2022-03-23 | Stop reason: HOSPADM

## 2022-03-22 RX ADMIN — SODIUM CHLORIDE, PRESERVATIVE FREE 10 ML: 5 INJECTION INTRAVENOUS at 10:30

## 2022-03-22 RX ADMIN — EPOETIN ALFA-EPBX 8000 UNITS: 4000 INJECTION, SOLUTION INTRAVENOUS; SUBCUTANEOUS at 02:39

## 2022-03-22 RX ADMIN — LOSARTAN POTASSIUM 100 MG: 50 TABLET, FILM COATED ORAL at 10:29

## 2022-03-22 RX ADMIN — AMLODIPINE BESYLATE 10 MG: 10 TABLET ORAL at 10:29

## 2022-03-22 RX ADMIN — SODIUM CHLORIDE, PRESERVATIVE FREE 10 ML: 5 INJECTION INTRAVENOUS at 22:58

## 2022-03-22 RX ADMIN — HEPARIN SODIUM 5000 UNITS: 1000 INJECTION INTRAVENOUS; SUBCUTANEOUS at 19:15

## 2022-03-22 RX ADMIN — FENTANYL CITRATE 50 MCG: 50 INJECTION INTRAMUSCULAR; INTRAVENOUS at 03:27

## 2022-03-22 RX ADMIN — AZITHROMYCIN MONOHYDRATE 1200 MG: 600 TABLET ORAL at 10:37

## 2022-03-22 RX ADMIN — ACETAMINOPHEN 650 MG: 325 TABLET ORAL at 11:22

## 2022-03-22 RX ADMIN — ATOVAQUONE 750 MG: 750 SUSPENSION ORAL at 10:29

## 2022-03-22 RX ADMIN — HYDROCODONE BITARTRATE AND ACETAMINOPHEN 2 TABLET: 5; 325 TABLET ORAL at 17:57

## 2022-03-22 RX ADMIN — CARVEDILOL 6.25 MG: 6.25 TABLET, FILM COATED ORAL at 10:29

## 2022-03-22 RX ADMIN — SODIUM CHLORIDE, PRESERVATIVE FREE 10 ML: 5 INJECTION INTRAVENOUS at 13:28

## 2022-03-22 RX ADMIN — POLYETHYLENE GLYCOL 3350 17 G: 17 POWDER, FOR SOLUTION ORAL at 13:46

## 2022-03-22 RX ADMIN — ONDANSETRON 4 MG: 2 INJECTION INTRAMUSCULAR; INTRAVENOUS at 03:27

## 2022-03-22 NOTE — PROGRESS NOTES
Primary Nurse Denise Meléndez RN and Angela RN performed a dual skin assessment on this patient No impairment noted  Arvin score is 20

## 2022-03-22 NOTE — PROGRESS NOTES
ESRD patient presented with Acute SOB, volume overload, hypertensive urgency, hypoxsia. Lab reviewed. Will arrange urgent Hemodialysis tonight & remove 3 Kg, Orders given,Discused with HD nurse on call & ER Physician.

## 2022-03-22 NOTE — CONSULTS
[facility]  (A Division of [address] [place] [address])      Consultation Note      Date of Admission: 3/21/2022    Date of Note: 3/22/2022      Reason for Referral: Sepsis, HIV  Referring Physician: Dr. Todd Buitrago, Dr. Debra Justice from this admission:   2/14 respiratory viral panel: Negative for all pathogens tested    Current Antimicrobials:    Prior Antimicrobials:          Assessment:         Acute hypoxic respiratory failure: Initially requiring BiPAP. Most likely related to volume overload and hypertensive emergency chest x-ray with pulmonary edema. Cannot entirely rule out underlying PJP pneumonia  HIV/AIDS: On tenofovir, Tivicay, Prezista/ritonavir, 3TC per documentation of prior to admission meds.; based on review of viral loads over the last 2 years it appears as though she become less compliant with her antiretroviral regimen. She is not sure who follows her locally for HIV but had moved from [facility] about 1 year ago. Previously on Biktarvy (bictegravir-emtricitibine-TNF)   -1/20/2022 HIV viral load 108,000, absolute CD4 16   - 2/14/22 HIV ,000 CD4 14   -9/28/2020 genotype reviewed-no predicted integrates resistance  Chronic active hepatitis B: Last HBV PCR was 526 on 1/20/2022; hepatitis B E antigen negative on 3/29/2021   - 2/14 HBV   End-stage renal disease on hemodialysis  History of syphilis: Last known RPR titer 1.8 about 1 years ago  Reported history of latent TB- quantiferron gold from 9/28/2020 negative  Hx substance abuse:  Prior documentation of cocaine use  Plan:   CBC, CMP in am  atovaquone in setting of end-stage renal disease and need for PGP prophylaxis  azithromycin for MAC prophylaxis given last known CD4 16  Check liver US.      Can schedule as OP in my clinic to follow for HIV management   - again discussed with her at length that she needs to be complaint with HAART and that she will eventually continue to decline if she does not take her HAART. Strongly advised her to keep f/u OP HIV appointment.    - given chronic active Hep B, very concerning that she is not complaint with her ARV's as this can procress quickly to liver failure and malignancy. Discussed with Dr. Desmond Kirby this am    Josefa Ruvalcaba, [address] [facility]  [address], [address] [facility], [address]  Office: [phone]  Mobile/Text: [phone]    Lines / Catheters:  Peripheral    HPI:  Ms. Oralia Davis is a pleasant 53-year-old female with a past medical history of end-stage renal disease on dialysis, schizophrenia, prior hep B as well as hep C, prior treatment for latent TB, and HIV presenting to [facility] yesterday evening with complaints of increasing shortness of breath. Upon arrival it was noted that she was tripoding and had sats of 86% upon presentation with associated hypertension with blood pressures in the 033T systolic range. She tells me that prior to this she had been feeling a little weak and only mildly short of breath. She was started on BiPAP noted improved oxygenation. Chest x-ray at the time and revealed interstitial edema. She remains afebrile with BP is now improved with systolics ranging from the 130s to 160s. She has been transitioned from BiPAP last night   to nasal cannula at 2 L. Plans for dialysis noted as per Dr. Dafne Olson. Of note she was discharged in November 2021 as well as 1 month and 1 week ago for a similar episode of acute respiratory failure with hypoxia and hypercapnia. She is a somewhat difficult historian and not able to clearly recall events leading up to her admission nor her prior admissions. She is not able to tell me if she has been taking any of her HAART. She has not kept appointments in my office yet.          Past Medical History:   Diagnosis Date    Acute kidney injury ([address]     AIDS (acquired immune deficiency syndrome) ([address]     Anemia     Esophageal candidiasis ([facility]     ESRD (end stage renal disease) ([facility]     HCV (hepatitis C virus)     Hepatitis B     Hepatitis C     HIV (human immunodeficiency virus infection) ([facility]     HIV (human immunodeficiency virus infection) ([place] [address]     Late latent syphilis     Pulmonary TB     Schizoaffective disorder ([place] [address]     Substance abuse ([place] [address]      Past Surgical History:   Procedure Laterality Date    HX GYN      HX NEPHRECTOMY      left kidney removed at age 17   [de-identified] NEPHRECTOMY       History reviewed. No pertinent family history.   Medications reviewed as below:   Current Facility-Administered Medications   Medication Dose Route Frequency Provider Last Rate Last Admin    heparin (porcine) 1,000 unit/mL injection 5,000 Units  5,000 Units IntraCATHeter DIALYSIS PRN Alma Murcia MD        sodium chloride (NS) flush 5-40 mL  5-40 mL IntraVENous Q8H Pollo Patel, DO   10 mL at 03/22/22 1328    sodium chloride (NS) flush 5-40 mL  5-40 mL IntraVENous PRN Selene Govern, DO        acetaminophen (TYLENOL) tablet 650 mg  650 mg Oral Q6H PRN Selene Govern, DO   650 mg at 03/22/22 1122    Or    acetaminophen (TYLENOL) suppository 650 mg  650 mg Rectal Q6H PRN Selene Govern, DO        polyethylene glycol (MIRALAX) packet 17 g  17 g Oral DAILY PRN Ama Big E, DO   17 g at 03/22/22 1346    ondansetron (ZOFRAN ODT) tablet 4 mg  4 mg Oral Q8H PRN Selene Govern, DO        Or    ondansetron (ZOFRAN) injection 4 mg  4 mg IntraVENous Q6H PRN Pollo Patel, DO        amLODIPine (NORVASC) tablet 10 mg  10 mg Oral DAILY Pollo Patel DO   10 mg at 03/22/22 1029    carvediloL (COREG) tablet 6.25 mg  6.25 mg Oral BID WITH MEALS Pollo Patel DO   6.25 mg at 03/22/22 1029    losartan (COZAAR) tablet 100 mg  100 mg Oral DAILY Pollo Patel, DO   100 mg at 03/22/22 1029    azithromycin (ZITHROMAX) tablet 1,200 mg  1,200 mg Oral Q7D Pollo Patel DO   1,200 mg at 03/22/22 1037    atovaquone (MEPRON) 750 mg/5 mL oral suspension 750 mg  750 mg Oral DAILY WITH BREAKFAST Sneed Bran, DO   750 mg at 03/22/22 1029    nitroglycerin (NITROBID) 2 % ointment 1 Inch  1 Inch Topical Q6H PRN Sneed Bran, DO        hydrALAZINE (APRESOLINE) 20 mg/mL injection 20 mg  20 mg IntraVENous Q6H PRN Alexandria Patel,         HYDROcodone-acetaminophen (NORCO) 5-325 mg per tablet 1-2 Tablet  1-2 Tablet Oral Q6H PRN Jose E Mast MD         Allergies   Allergen Reactions    Ceftriaxone Itching     Rxn in ER recorded 2007      Aspirin Hives    Pcn [Penicillins] Hives     Social History     Socioeconomic History    Marital status: LEGALLY      Spouse name: Not on file    Number of children: Not on file    Years of education: Not on file    Highest education level: Not on file   Occupational History    Not on file   Tobacco Use    Smoking status: Not on file    Smokeless tobacco: Not on file   Substance and Sexual Activity    Alcohol use: Not on file    Drug use: Not on file    Sexual activity: Not on file   Other Topics Concern    Not on file   Social History Narrative    Not on file     Social Determinants of Health     Financial Resource Strain:     Difficulty of Paying Living Expenses: Not on file   Food Insecurity:     Worried About Running Out of Food in the Last Year: Not on file    Alphonse of Food in the Last Year: Not on file   Transportation Needs:     Lack of Transportation (Medical): Not on file    Lack of Transportation (Non-Medical):  Not on file   Physical Activity:     Days of Exercise per Week: Not on file    Minutes of Exercise per Session: Not on file   Stress:     Feeling of Stress : Not on file   Social Connections:     Frequency of Communication with Friends and Family: Not on file    Frequency of Social Gatherings with Friends and Family: Not on file    Attends Yarsani Services: Not on file    Active Member of Clubs or Organizations: Not on file    Attends Club or Organization Meetings: Not on file    Marital Status: Not on file   Intimate Partner Violence:     Fear of Current or Ex-Partner: Not on file    Emotionally Abused: Not on file    Physically Abused: Not on file    Sexually Abused: Not on file   Housing Stability:     Unable to Pay for Housing in the Last Year: Not on file    Number of Kenneth in the Last Year: Not on file    Unstable Housing in the Last Year: Not on file        Review of Systems    Negative Unless BOLDED    General: fevers, chills, myalgias, arthralgias, unexplained weight loss, malaise, fatigue. HEENT:  headaches,sinus pain or presure, recent URI, recent dental procedures;  tinnitus, hearing loss , visual changes, catarats, dizziness or blurred vision  PUlMONARY:  cough , shortness of breath, sputum production, hx of asthma or COPD. previous treatement for TB or PPD. Cardiovascular: chest pain, previous CAD/MI, vavlular heart disease,  murmurs  GI:   nausea, vomiting, diarrhea, abdominal pain, prior C.diff  :  urinary frequency, dysuria, hematuria, bladder incontinence.    Neurologic:  seizures, syncope or prior CVA/TIA, confusion, memory impairment, neuropathy  Musculoskeletal:  myalgias arthralgias, joint pain/ swelling,  back pain  Skin:  Purities,  recurrent cellulitis,  chronic stasis ulcer, diabetic foot ulcers  Endocrine: polyuria, polydipsia, hair loss, weight gain  Psych: Denies depression or treatment by a psychiatrist/psycologist  Heme-Onc: prior DVT, easy bruising, fatigue, malignancy        Objective:        Visit Vitals  BP (!) 148/87 (BP 1 Location: Left upper arm, BP Patient Position: At rest;Semi fowlers)   Pulse 86   Temp 97.5 °F (36.4 °C)   Resp 17   Ht 5' 1\" (1.549 m)   Wt 51.3 kg (113 lb)   SpO2 97%   BMI 21.35 kg/m²     Temp (24hrs), Av °F (36.7 °C), Min:97.5 °F (36.4 °C), Max:98.6 °F (37 °C)        General:   awake alert and oriented to person and place   Skin:   scattered old scars to trunk, extremities, bruising to UE   HEENT:  Normocephalic, atraumatic, PERRL, EOMI, no scleral icterus or pallor; no conjunctival hemmohage;  nasal and oral mucous are moist and without evidence of lesions. No thrush. Dentition poor- missing upper teeth, lower teeth discolored with caries. Neck supple, no bruits. Lymph Nodes:   no cervical, axillary or inguinal adenopathy   Lungs: Tachypneic, bilateral rales, no wheezes   Heart:  RRR, s1 and s2; no murmurs rubs or gallops, no edema, + pedal pulses   Abdomen:  soft, non-distended, active bowel sounds, no hepatomegaly, no splenomegaly. non-tender   Genitourinary:  deferred   Extremities:   no clubbing, cyanosis; no joint effusions or swelling; Full ROM of all large joints to the upper and lower extremities; muscle mass appropriate for age   Neurologic:  No gross focal sensory abnormalities; equal muscle strength to upper and lower extremities with general weakness. Speech appropirate. Cranial nerves intact   Psychiatric:   appropriate and interactive although a somewhat quirky affect. Labs: Results:   Chemistry Recent Labs     03/21/22 2125   *      K 3.9      CO2 17*   BUN 58*   CREA 9.68*   CA 8.3*   AGAP 14   BUCR 6*   *   TP 8.9*   ALB 3.9   GLOB 5.0*   AGRAT 0.8      CBC w/Diff Recent Labs     03/21/22 2125   WBC 7.2   RBC 2.84*   HGB 8.8*   HCT 28.8*      GRANS 40   LYMPH 39   EOS 7*            No results found for: [facility] Lab Results   Component Value Date/Time    Culture result: (A) 10/24/2021 08:20 PM     STAPHYLOCOCCUS SPECIES, COAGULASE NEGATIVE GROWING IN THE AEROBIC BOTTLE NO SITE INDICATED    Culture result: NO GROWTH 6 DAYS 10/24/2021 08:00 PM        Results     ** No results found for the last 336 hours. **            Imaging:      All imaging reviewed from Admission to present as per radiology interpretation in [address] [place] [address]

## 2022-03-22 NOTE — PROGRESS NOTES
conducted an initial consultation and Spiritual Assessment for Elkin Mtz, who is a 63 y.o.,female. Patients Primary Language is: [place]. According to the patients EMR Yazidism Affiliation is: No Samaritan. The reason the Patient came to the hospital is:   Patient Active Problem List    Diagnosis Date Noted    Respiratory failure, acute ([facility] 03/22/2022    Pulmonary edema 03/22/2022    Hypertensive urgency 03/22/2022    Cardiac volume overload 03/15/2022    Anemia 03/15/2022    Secondary hyperparathyroidism of renal origin ([facility] 02/14/2022    AIDS (acquired immune deficiency syndrome) ([facility] 11/08/2021    Hepatitis B 11/08/2021    Chronic hepatitis C without hepatic coma ([facility] 11/08/2021    Schizophrenia ([facility] 11/08/2021    History of cocaine abuse ([facility] 11/08/2021    ESRD (end stage renal disease) on dialysis ([facility] 10/25/2021    Volume overload 10/25/2021    Respiratory failure ([facility] 10/25/2021    Allergic reaction 11/08/2014        The  provided the following Interventions:  Initiated a relationship of care and support with patient in room 459 today. Listened empathically as patient talked about her past and her feeling that she is dying cause she keeps having dreams and problems in life. Patient has a history of Alcohol use she says and she says that she knows she needs to get things right with God if she is going to die. Patient is not willing to surrender her drink. Patient was given a bible at her request.  Provided information about [id number] [employer]. Offered prayer and assurance of continued prayers on patients behalf. The following outcomes were achieved:  Patient shared limited information about Her medical narrative and spiritual beliefs. Patient expressed gratitude for pastoral care visit. Assessment:  Patient does not have any Islam/cultural needs that will affect patients preferences in health care.   There are no further spiritual or Voodoo issues which require Spiritual Care Services interventions at this time. Plan:  Chaplains will continue to follow and will provide pastoral care on an as needed/requested basis    . Tabatha Govea   Spiritual Care   [phone]

## 2022-03-22 NOTE — DIALYSIS
JASMYNE        ACUTE HEMODIALYSIS FLOW SHEET      HEMODIALYSIS ORDERS: Physician: Julieta Wheeler     Dialyzer: revaclear   Duration: 3 hr  BFR: 350   DFR: 800   Dialysate:  Temp 36-37*C  K+   3    Ca+  2.5 Na 138 Bicarb 35   Weight:  51.3 kg    Patient Chart [x]     Unable to Obtain []     Dry weight/UF Goal: 3000   Access: right chest TDC    Heparin: [x]  Bolus 1000 Units    +  [x] Hourly 500 Units        Catheter locking solution: heparin    Pre BP:   174/109    Pulse:     107       Respirations: 37  Temperature:   97.8   Labs: Pre        Post:         [x] N/A   Additional Orders(medications, blood products, hypotension management):       [x] N/A     [x] Jasmyne Consent Verified     CATHETER ACCESS: []N/A   [x]Right chest  []Left   []IJ     []Fem   []transhepatic   [] First use X-ray verified     [x]Permanent                [] Temporary   [x]No S/S infection  []Redness  []Drainage []Cultured []Swelling []Pain   [x]Medical Aseptic Prep Utilized   [x]Dressing Changed  [x] Biopatch  Date: 3/22/22       []Clotted   [x]Patent   Flows: [x]Good  []Poor  []Reversed   If access problem,  notified: []Yes    [x]N/A           GRAFT/FISTULA ACCESS:  [x]N/A                             GENERAL ASSESSMENT:      LUNGS:  Rate 37 SaO2 %        [] N/A    [] Clear  [] Coarse  [x] Crackles  [] Wheezing        [] Diminished     Location : [x]RLL   [x]LLL    []RUL  []PAYAL     Cough: []Productive  []Dry  [x]N/A   Respirations:  [x]Easy  []Labored     Therapy:   []RA  []NC  l/min    Mask: []NRB []Venti       O2%                  []Ventilator  []Intubated  [] Trach  [x] BiPaP     CARDIAC: [x]Regular      [] Irregular   [] Pericardial Rub  [] JVD        [x]  Monitored  [x] Bedside  [] Remotely monitored [x]   Rhythm: sinus tach     EDEMA: [] None   [x]Generalized  [] Pitting [] 1    [] 2    [] 3    [] 4                 [] Facial  [] Pedal  []  UE  [] LE     SKIN:   [x] Warm   [] Hot     [] Cold   [x] Dry     [] Pale   [] Diaphoretic [] Flushed  [] Jaundiced  [] Cyanotic  [] Rash  [] Weeping     LOC:    [x] Alert      [x]Oriented:    [x] Person     [] Place  []Time               [] Confused  [] Lethargic  [] Medicated  [] Non-responsive     GI / ABDOMEN:  [] Flat    [] Distended    [x] Soft    [] Firm   []  Obese                             [] Diarrhea  [x] Bowel Sounds  [] Nausea  [] Vomiting       / URINE ASSESSMENT:[] Voiding   [x] Oliguria  [] Anuria   []  Aragon     [] Incontinent    []  Incontinent Brief      []  Bathroom Privileges       PAIN: [x] 0 []1  []2   []3   []4   []5   []6   []7   []8   []9   []10              Scale 0-10  Action/Follow Up:      MOBILITY:  [] Amb    [] Amb/Assist    [] Bed    [] Wheelchair  [x] Stretcher      All Vitals and Treatment Details on Attached [facility] [place]: [facility]          Room # ER04/04      [] 1st Time Acute  [x] Stat  [] Routine  [] Urgent     [] Acute Room  [x]  Bedside  [] ICU/CCU  [x] ER   Isolation Precautions:   There are currently no Active Isolations      Special Considerations:         [] Blood Consent Verified [x]N/A      ALLERGIES:   Allergies   Allergen Reactions    Ceftriaxone Itching     Rxn in ER recorded 2007      Aspirin Hives    Pcn [Penicillins] Hives               Code Status:Prior        Hepatitis Status:                       Lab Results   Component Value Date/Time    Hepatitis B surface Ag 0.15 02/14/2022 03:20 PM    Hepatitis B surface Ab 115.39 02/14/2022 03:20 PM    Hep B Core Ab, IgM Negative 02/17/2022 08:22 PM    Hep B Core Ab, total Positive (A) 02/17/2022 08:22 PM                     Current Labs:   Lab Results   Component Value Date/Time    Sodium 137 03/21/2022 09:25 PM    Potassium 3.9 03/21/2022 09:25 PM    Chloride 106 03/21/2022 09:25 PM    CO2 17 (L) 03/21/2022 09:25 PM    Anion gap 14 03/21/2022 09:25 PM    Glucose 186 (H) 03/21/2022 09:25 PM    BUN 58 (H) 03/21/2022 09:25 PM    Creatinine 9.68 (H) 03/21/2022 09:25 PM    BUN/Creatinine ratio 6 (L) 03/21/2022 09:25 PM    GFR est AA 5 (L) 03/21/2022 09:25 PM    GFR est non-AA 4 (L) 03/21/2022 09:25 PM    Calcium 8.3 (L) 03/21/2022 09:25 PM      Lab Results   Component Value Date/Time    WBC 7.2 03/21/2022 09:25 PM    HGB 8.8 (L) 03/21/2022 09:25 PM    HCT 28.8 (L) 03/21/2022 09:25 PM    PLATELET 042 25/19/9585 09:25 PM    .4 (H) 03/21/2022 09:25 PM                                                                                     DIET:   None       PRIMARY NURSE REPORT: First initial/Last name/Title      Pre Dialysis: KITA Khoury RN     Time: 2348      EDUCATION:    [x] Patient [] Other         Knowledge Basis: []None [x]Minimal [] Substantial   Barriers to learning: pt is restless and in BiPAP   [] Access Care     [] S&S of infection     [] Fluid Management     []K+     [x]Procedural    []Albumin     [] Medications     [] Tx Options     [] Transplant     [] Diet     [] Other   Teaching Tools:  [x] Explain  [x] Demo  [] Handouts [] Video  Patient response:  [x] Verbalized understanding  [] Teach back  [] Return demonstration [] Requires follow up   Inappropriate due to:            [x]Time Out/Safety Check  [x]Extracorporeal Circuit Tested for integrity       RO/HEMODIALYSIS MACHINE SAFETY CHECKS  Before each treatment:       Machine Number:                   [facility]                                                                   [x] Unit Machine # 2 with centralized RO                                                                   Alarm Test:  Pass time 6401               [x] RO/Machine Log Complete      Temp   36             Dialysate: pH  7.6 Conductivity: Meter   14.0     HD Machine   14.0                  TCD: 13.8  Dialyzer Lot # [id number]            Blood Tubing Lot # [id number]          Saline Lot #  [id number]     CHLORINE TESTING-Before each treatment and every 4 hours    Total Chlorine: [x] less than 0.1 ppm  Time: 0000 4 Hr/2nd Check Time: 0400   (if greater than 0.1 ppm from Primary then every 30 minutes from Secondary)     TREATMENT INITIATION  with Dialysis Precautions:   [x] All Connections Secured                 [x] Saline Line Double Clamped   [x] Venous Parameters Set                  [x] Arterial Parameters Set    [x] Prime Given 250ml                          [x]Air Foam Detector Engaged      Treatment Initiation Note:  STAT treatment due to fluid overload. RN arrived to pt bedside in ED. Pt on stretcher; on BiPAP with rapid respirations. Nitro infusing via PIV RUE. Pt A&O to self only; restless. Procedure explained and consent obtained. Right chest TDC assessed with no s/s complications. HD initiated without difficulty. Heparin bolus administered per MD order. During Treatment Notes:  0030 Face and access in view with connections secure. 0045 Face and access in view with connections secure. 0100 Face and access in view with connections secure. 0115 Face and access in view with connections secure. 0130 Face and access in view with connections secure. Pt c/o RUE PIV hurting her arm. Site re-wrapped. 0145 Face and access in view with connections secure. 0200 Face and access in view with connections secure. 0215 Face and access in view with connections secure. 0230 Face and access in view with connections secure. 0245 Face and access in view with connections secure. 0300 Face and access in view with connections secure.  0309 Pt c/o cramping in BLE; very restless, yelling out. HR 140s. Pt asking to stop treatment. Blood rinsed back.       Medication Dose Volume Route Time [facility] name Title   heparin 1000 units 1 ml dialysis 0025 CLIFFORD Ward RN                   Post Assessment:   Dialyzer Cleared: [] Good [x] Fair  [] Poor  Blood processed:  53.8 L  UF Removed  3073 mL  POst BP:   176/95       Pulse: 109        Respirations: 16  Temperature: 97.37 Lungs:     [x] Clear      [] Course         [] Crackles    [] Wheezing         [] Diminished   Post Tx Vascular Access:      N/A Cardiac:   [x] Regular   [] Irregular   [x] Monitor  [] N/A      Rhythm:  Sinus tach       Catheter:   Locking solution: Heparin 1:1000   Art. 1.9  Vic. 2.0    Skin:   Pain:   [x] Warm  [x] Dry [] Diaphoretic    [] Flushed    [] Pale [] Cyanotic [x]0  []1  []2   []3  []4   []5   []6   []7   []8   []9   []10     Post Treatment Note:  Pt completed 2 hours, 40 mins of 3 hour treatment. Net 2573 mL UF removed. Pt started vomiting once off the machine. Dr. Radha Veras updated. Primary nurse at bedside providing care. POST TREATMENT PRIMARY NURSE HANDOFF REPORT:     First initial/Last name/Title         Post Dialysis: S. Lovella Libman, RN      Time:  0330     Abbreviations: AVG-arterial venous graft, AVF-arterial venous fistula, IJ-Internal Jugular, Subcl-Subclavian, Fem-Femoral, Tx-treatment, AP/HR-apical heart rate, DFR-dialysate flow rate, BFR-blood flow rate, AP-arterial pressure, -venous pressure, UF-ultrafiltrate, TMP-transmembrane pressure, Vic-Venous, Art-Arterial, RO-Reverse Osmosis

## 2022-03-22 NOTE — ED PROVIDER NOTES
EMERGENCY DEPARTMENT HISTORY AND PHYSICAL EXAM      Date: 3/21/2022  Patient Name: Hamilton Strange    History of Presenting Illness     Chief Complaint   Patient presents with    Shortness of Breath       History (Context): Hamilton Strange is a 63 y.o. female with a past medical history significant for HIV, ESRD on dialysis Tuesday, Thursday, Saturday, hepatitis, anemia, schizophrenia, pulmonary TB, latent syphilis comes into the ED today due to dyspnea. Patient's history obtained via family member who is patient's current guardian. Member states that patient was acting her normal self up until earlier this evening when she became acutely dyspneic. However states that this is happened previously to patient secondary to pulmonary edema and fluid overload. Patient has been compliant with her dialysis sessions and has not had any evidence of increased fluid retention since her last session. Family member denies any fever, abdominal pain, nausea, vomiting, or diarrhea. Otherwise history and review of systems limited secondary to patient's clinical condition      PCP: Gage Lopez MD    Current Outpatient Medications   Medication Sig Dispense Refill    amLODIPine (NORVASC) 10 mg tablet Take 1 Tablet by mouth daily. 30 Tablet 0    atovaquone (MEPRON) 750 mg/5 mL suspension Take 5 mL by mouth daily (with breakfast) for 30 days. 150 mL 0    azithromycin (ZITHROMAX) 600 mg tablet Take 2 Tablets by mouth every seven (7) days. 30 Tablet 0    carvediloL (COREG) 6.25 mg tablet Take 1 Tablet by mouth two (2) times daily (with meals). 60 Tablet 0    losartan (COZAAR) 100 mg tablet Take 1 Tablet by mouth daily.  30 Tablet 0       Past History     Past Medical History:   Past Medical History:   Diagnosis Date    [facility]     AIDS (acquired immune deficiency syndrome) [facility]     Anemia     Esophageal candidiasis (HCC)     ESRD (end stage renal disease) (HCC)     HCV (hepatitis C virus)     Hepatitis B     Hepatitis C     HIV (human immunodeficiency virus infection) [facility]     HIV (human immunodeficiency virus infection) [facility]     Late latent syphilis     Pulmonary TB     [facility]     Substance abuse ([facility]        Past Surgical History:  Past Surgical History:   Procedure Laterality Date    HX GYN      HX NEPHRECTOMY      left kidney removed at age 17   Lenjailyn Camilla NEPHRECTOMY         Family History:  No family history on file. Social History:   Social History     Tobacco Use    Smoking status: Not on file    Smokeless tobacco: Not on file   Substance Use Topics    Alcohol use: Not on file    Drug use: Not on file       Allergies: Allergies   Allergen Reactions    Ceftriaxone Itching     Rxn in ER recorded 2007      Aspirin Hives    Pcn [Penicillins] Hives       PMH, PSH, family history, social history, allergies reviewed with the patient with significant items noted above. Review of Systems   Review of Systems   Unable to perform ROS: Severe respiratory distress   Constitutional: Negative for fatigue and fever. Respiratory: Positive for shortness of breath. Cardiovascular: Negative for chest pain. Gastrointestinal: Negative for abdominal pain. Skin: Negative for wound. Physical Exam     Vitals:    03/21/22 2111 03/21/22 2114   BP: (!) 232/133    Pulse: (!) 130    Resp: (!) 31    SpO2: (!) 86% 98%   Weight: 51.3 kg (113 lb)    Height: 5' 1\" (1.549 m)        Physical Exam  Vitals and nursing note reviewed. Constitutional:       General: She is in acute distress. Appearance: Normal appearance. She is toxic-appearing. She is not ill-appearing. HENT:      Head: Normocephalic and atraumatic. Mouth/Throat:      Mouth: Mucous membranes are moist.   Eyes:      General: No scleral icterus. Conjunctiva/sclera: Conjunctivae normal.   Cardiovascular:      Rate and Rhythm: Regular rhythm. Tachycardia present.       Comments: Normal peripheral perfusion  Pulmonary:      Effort: Respiratory distress present. Comments: Increased work of breathing, on NRB  Abdominal:      General: There is no distension. Palpations: Abdomen is soft. Tenderness: There is no abdominal tenderness. Musculoskeletal:         General: No deformity. Normal range of motion. Cervical back: Normal range of motion and neck supple. Skin:     General: Skin is warm and dry. Findings: No rash. Neurological:      General: No focal deficit present. Mental Status: She is alert and oriented to person, place, and time. Mental status is at baseline. Psychiatric:         Mood and Affect: Mood normal.         Thought Content: Thought content normal.         Diagnostic Study Results     Labs -     Recent Results (from the past 12 hour(s))   CBC WITH AUTOMATED DIFF    Collection Time: 03/21/22  9:25 PM   Result Value Ref Range    WBC 7.2 4.6 - 13.2 K/uL    RBC 2.84 (L) 4.20 - 5.30 M/uL    HGB 8.8 (L) 12.0 - 16.0 g/dL    HCT 28.8 (L) 35.0 - 45.0 %    .4 (H) 78.0 - 100.0 FL    MCH 31.0 24.0 - 34.0 PG    MCHC 30.6 (L) 31.0 - 37.0 g/dL    RDW 16.3 (H) 11.6 - 14.5 %    PLATELET 398 174 - 005 K/uL    MPV 9.4 9.2 - 11.8 FL    NRBC 0.0 0  WBC    ABSOLUTE NRBC 0.00 0.00 - 0.01 K/uL    NEUTROPHILS 40 40 - 73 %    LYMPHOCYTES 39 21 - 52 %    MONOCYTES 13 (H) 3 - 10 %    EOSINOPHILS 7 (H) 0 - 5 %    BASOPHILS 1 0 - 2 %    IMMATURE GRANULOCYTES 0 0.0 - 0.5 %    ABS. NEUTROPHILS 2.9 1.8 - 8.0 K/UL    ABS. LYMPHOCYTES 2.8 0.9 - 3.6 K/UL    ABS. MONOCYTES 0.9 0.05 - 1.2 K/UL    ABS. EOSINOPHILS 0.5 (H) 0.0 - 0.4 K/UL    ABS. BASOPHILS 0.1 0.0 - 0.1 K/UL    ABS. IMM.  GRANS. 0.0 0.00 - 0.04 K/UL    DF AUTOMATED     METABOLIC PANEL, COMPREHENSIVE    Collection Time: 03/21/22  9:25 PM   Result Value Ref Range    Sodium 137 136 - 145 mmol/L    Potassium 3.9 3.5 - 5.5 mmol/L    Chloride 106 100 - 111 mmol/L    CO2 17 (L) 21 - 32 mmol/L    Anion gap 14 3.0 - 18 mmol/L Glucose 186 (H) 74 - 99 mg/dL    BUN 58 (H) 7.0 - 18 MG/DL    Creatinine 9.68 (H) 0.6 - 1.3 MG/DL    BUN/Creatinine ratio 6 (L) 12 - 20      GFR est AA 5 (L) >60 ml/min/1.73m2    GFR est non-AA 4 (L) >60 ml/min/1.73m2    Calcium 8.3 (L) 8.5 - 10.1 MG/DL    Bilirubin, total 0.4 0.2 - 1.0 MG/DL    ALT (SGPT) 21 13 - 56 U/L    AST (SGOT) 27 10 - 38 U/L    Alk. phosphatase 138 (H) 45 - 117 U/L    Protein, total 8.9 (H) 6.4 - 8.2 g/dL    Albumin 3.9 3.4 - 5.0 g/dL    Globulin 5.0 (H) 2.0 - 4.0 g/dL    A-G Ratio 0.8 0.8 - 1.7     MAGNESIUM    Collection Time: 03/21/22  9:25 PM   Result Value Ref Range    Magnesium 2.1 1.6 - 2.6 mg/dL   EKG, 12 LEAD, INITIAL    Collection Time: 03/21/22  9:25 PM   Result Value Ref Range    Ventricular Rate 133 BPM    Atrial Rate 133 BPM    P-R Interval 124 ms    QRS Duration 68 ms    Q-T Interval 378 ms    QTC Calculation (Bezet) 562 ms    Calculated P Axis 58 degrees    Calculated R Axis 5 degrees    Calculated T Axis 97 degrees    Diagnosis        Critical Test Result: Long QTc  Sinus tachycardia  Left ventricular hypertrophy with repolarization abnormality ( [place]   product )  Abnormal ECG  When compared with ECG of 14-MAR-2022 23:13,  T wave inversion now evident in Anterior leads        Labs Reviewed   CBC WITH AUTOMATED DIFF - Abnormal; Notable for the following components:       Result Value    RBC 2.84 (*)     HGB 8.8 (*)     HCT 28.8 (*)     .4 (*)     MCHC 30.6 (*)     RDW 16.3 (*)     MONOCYTES 13 (*)     EOSINOPHILS 7 (*)     ABS. EOSINOPHILS 0.5 (*)     All other components within normal limits   METABOLIC PANEL, COMPREHENSIVE - Abnormal; Notable for the following components:    CO2 17 (*)     Glucose 186 (*)     BUN 58 (*)     Creatinine 9.68 (*)     BUN/Creatinine ratio 6 (*)     GFR est AA 5 (*)     GFR est non-AA 4 (*)     Calcium 8.3 (*)     Alk.  phosphatase 138 (*)     Protein, total 8.9 (*)     Globulin 5.0 (*)     All other components within normal limits TROPONIN-HIGH SENSITIVITY   MAGNESIUM       Radiologic Studies -   XR CHEST PORT    (Results Pending)     CT Results  (Last 48 hours)    None        CXR Results  (Last 48 hours)    None          The laboratory results, imaging results, and other diagnostic exams were reviewed in the EMR. Medical Decision Making   I am the first provider for this patient. I reviewed the vital signs, available nursing notes, past medical history, past surgical history, family history and social history. Vital Signs-Reviewed the patient's vital signs. ED EKG interpretation:  Rhythm: sinus tachycardia; and regular . Rate (approx.): 133; Axis: normal; P wave: normal; QRS interval: normal ; ST/T wave: non-specific changes; Other findings: abnormal ekg. This EKG was interpreted by Milton Benavides D.O. Records Reviewed: Personally, on initial evaluation    MDM:   Jose David presents with complaint of dyspnea  DDX includes but is not limited to: Pulmonary edema, hypertensive urgency, ESRD    Patient overall in acute distress, increased work of breathing, and hypoxic upon arrival to the emergency department. Will obtain lab work and imaging for further evaluation of patients complaint. Will continue to monitor and evaluate patient while in the ED. Orders as below:  Orders Placed This Encounter    XR CHEST PORT    CBC WITH AUTOMATED DIFF    METABOLIC PANEL, COMPREHENSIVE    TROPONIN-HIGH SENSITIVITY    MAGNESIUM    EKG, 12 LEAD, INITIAL    nitroglycerin (NITROBID) 2 % ointment 1 Inch        ED Course:   ED Course as of 03/22/22 1450   Mon Mar 21, 2022   2208 His lab work significant for hemoglobin 8.8, otherwise labs grossly within normal limits. Patient's chest x-ray shows pulmonary edema. We will continue to monitor patient and consult nephrology at this time for emergent dialysis. [DV]   2518 Spoke with Dr. Lacey Toure dialyzed patient while in the emergency department here tonight.   We will continue to monitor patient [DV]   3254 Continues to have elevated blood pressure despite Nitropaste. Will start patient on a nitroglycerin drip at this time. We will continue to monitor patient. [DV]   Tue Mar 22, 2022   0345 Patient has finished up her dialysis session. No longer tachycardic. Will trial patient off of BiPAP for further disposition [DV]    Patient continues to be hypoxic despite receiving dialysis. Will admit patient to the hospital for further treatment and evaluation of this time. [DV]      ED Course User Index  [DV] Curtis Lebron DO           Procedures:  Procedures        Diagnosis and Disposition     CLINICAL IMPRESSION:  No diagnosis found. Current Discharge Medication List          Disposition: Admit    Patient condition at time of disposition: Stable      Critical Care Time:   The services I provided to this patient were to treat and/or prevent clinically significant deterioration that could result in the failure of one or more body systems and/or organ systems due to Hypoxia/Hypoxemia and Pulmonary Edema. Services included the following:  -reviewing nursing notes and old charts  -vital sign assessments  -direct patient care  -medication orders and management  -interpreting and reviewing diagnostic studies/labs  -re-evaluations  -documentation time    Aggregate critical care time was 34 minutes, which includes only time during which I was engaged in work directly related to the patient's care as described above, whether I was at bedside or elsewhere in the Emergency Department. It did not include time spent performing other reported procedures or the services of residents, students, nurses, or advance practice providers. Rudolph Frias D.O.    9:59 PM          Dragon Disclaimer     Please note that this dictation was completed with Roamz, the computer voice recognition software.   Quite often unanticipated grammatical, syntax, homophones, and other interpretive errors are inadvertently transcribed by the computer software. Please disregard these errors. Please excuse any errors that have escaped final proofreading. Milton CLINTON.

## 2022-03-22 NOTE — PROGRESS NOTES
[facility] Hospitalists  Progress Note    Patient: Jose David Age: 63 y.o. : 1960 MR#: [id number] SSN: [id number]  Date: 3/22/2022     Subjective/24-hour events:     Complains of generalized pain but SOB has resolved. Assessment:   Acute hypoxic respiratory failure requiring BiPAP, resolved  ESRD, HD dependent  Volume overload  HIV/AIDS  Chronic hepatitis B  Hypertension  Anemia  Hyperglycemia  Medical noncompliance  Substance abuse history      Plan:   Volume management/hemodialysis per nephrology. ID evaluation given HIV/AIDS and Hep B history. D/W  via phone, recommendations appreciated in advance. meds as appropriate. Check iron stores and hgb A1c. Clinical nutrition evaluation. Mobilize. Disposition TBD. Continue supportive care o/w. Follow. Case discussed with:  [x]Patient  []Family  [x]Nursing  [x]Case Management  DVT Prophylaxis:  []Lovenox  [x]Hep SQ  []SCDs  []Coumadin   []On Heparin gtt    Objective:   VS:   Visit Vitals  BP (!) 162/86   Pulse 75   Temp 98.2 °F (36.8 °C)   Resp 17   Ht 5' 1\" (1.549 m)   Wt 51.3 kg (113 lb)   SpO2 100%   BMI 21.35 kg/m²      Tmax/24hrs: Temp (24hrs), Av.1 °F (36.7 °C), Min:97.7 °F (36.5 °C), Max:98.6 °F (37 °C)      Intake/Output Summary (Last 24 hours) at 3/22/2022 0946  Last data filed at 3/22/2022 0309  Gross per 24 hour   Intake 1.4 ml   Output 2573 ml   Net -2571.6 ml       General:  In NAD. Nontoxic-appearing. Cardiovascular:  RRR. Pulmonary:  Lungs clear bilaterally, no wheezes. No accessory muscle use. GI:  Abdomen soft, NTTP. Extremities:  Warm, no edema or ischemia. Neuro:  Awake and alert.   Moves extremities spontaneously, motor grossly nonfocal.    Labs:    Recent Results (from the past 24 hour(s))   CBC WITH AUTOMATED DIFF    Collection Time: 22  9:25 PM   Result Value Ref Range    WBC 7.2 4.6 - 13.2 K/uL    RBC 2.84 (L) 4.20 - 5.30 M/uL    HGB 8.8 (L) 12.0 - 16.0 g/dL    HCT 28.8 (L) 35.0 - 45.0 %    .4 (H) 78.0 - 100.0 FL    MCH 31.0 24.0 - 34.0 PG    MCHC 30.6 (L) 31.0 - 37.0 g/dL    RDW 16.3 (H) 11.6 - 14.5 %    PLATELET 554 356 - 628 K/uL    MPV 9.4 9.2 - 11.8 FL    NRBC 0.0 0  WBC    ABSOLUTE NRBC 0.00 0.00 - 0.01 K/uL    NEUTROPHILS 40 40 - 73 %    LYMPHOCYTES 39 21 - 52 %    MONOCYTES 13 (H) 3 - 10 %    EOSINOPHILS 7 (H) 0 - 5 %    BASOPHILS 1 0 - 2 %    IMMATURE GRANULOCYTES 0 0.0 - 0.5 %    ABS. NEUTROPHILS 2.9 1.8 - 8.0 K/UL    ABS. LYMPHOCYTES 2.8 0.9 - 3.6 K/UL    ABS. MONOCYTES 0.9 0.05 - 1.2 K/UL    ABS. EOSINOPHILS 0.5 (H) 0.0 - 0.4 K/UL    ABS. BASOPHILS 0.1 0.0 - 0.1 K/UL    ABS. IMM. GRANS. 0.0 0.00 - 0.04 K/UL    DF AUTOMATED     METABOLIC PANEL, COMPREHENSIVE    Collection Time: 03/21/22  9:25 PM   Result Value Ref Range    Sodium 137 136 - 145 mmol/L    Potassium 3.9 3.5 - 5.5 mmol/L    Chloride 106 100 - 111 mmol/L    CO2 17 (L) 21 - 32 mmol/L    Anion gap 14 3.0 - 18 mmol/L    Glucose 186 (H) 74 - 99 mg/dL    BUN 58 (H) 7.0 - 18 MG/DL    Creatinine 9.68 (H) 0.6 - 1.3 MG/DL    BUN/Creatinine ratio 6 (L) 12 - 20      GFR est AA 5 (L) >60 ml/min/1.73m2    GFR est non-AA 4 (L) >60 ml/min/1.73m2    Calcium 8.3 (L) 8.5 - 10.1 MG/DL    Bilirubin, total 0.4 0.2 - 1.0 MG/DL    ALT (SGPT) 21 13 - 56 U/L    AST (SGOT) 27 10 - 38 U/L    Alk.  phosphatase 138 (H) 45 - 117 U/L    Protein, total 8.9 (H) 6.4 - 8.2 g/dL    Albumin 3.9 3.4 - 5.0 g/dL    Globulin 5.0 (H) 2.0 - 4.0 g/dL    A-G Ratio 0.8 0.8 - 1.7     TROPONIN-HIGH SENSITIVITY    Collection Time: 03/21/22  9:25 PM   Result Value Ref Range    Troponin-High Sensitivity 12 0 - 54 ng/L   MAGNESIUM    Collection Time: 03/21/22  9:25 PM   Result Value Ref Range    Magnesium 2.1 1.6 - 2.6 mg/dL   EKG, 12 LEAD, INITIAL    Collection Time: 03/21/22  9:25 PM   Result Value Ref Range    Ventricular Rate 133 BPM    Atrial Rate 133 BPM    P-R Interval 124 ms    QRS Duration 68 ms    Q-T Interval 378 ms    QTC Calculation (Bezet) 562 ms    Calculated P Axis 58 degrees    Calculated R Axis 5 degrees    Calculated T Axis 97 degrees    Diagnosis       Critical Test Result: Long QTc  Sinus tachycardia  Left ventricular hypertrophy with repolarization abnormality ( [employer]   product )  Abnormal ECG  When compared with ECG of 14-MAR-2022 23:13,  T wave inversion now evident in Anterior leads  Confirmed by Elmer Moreno MD, --- ([id number]) on 3/22/2022 9:12:11 AM         Signed By: Mortimer Laity, MD     March 22, 2022

## 2022-03-22 NOTE — PROGRESS NOTES
Progress Note    Ajay Young  63 y.o. Admit Date: 3/21/2022  Principal Problem:    Respiratory failure, acute ([facility] (3/22/2022) POA: Yes    Active Problems:    ESRD (end stage renal disease) on dialysis ([facility] (10/25/2021) POA: Yes      AIDS (acquired immune deficiency syndrome) ([facility] (11/8/2021) POA: Yes      Pulmonary edema (3/22/2022) POA: Yes      Hypertensive urgency (3/22/2022) POA: Yes            Subjective:     Patient feels better, breathing better, had to stop dialysis due to cramps. Has lot of Muscle wasting from his underlying AIDS,moning with pain       A comprehensive review of systems was negative except for that written in the History of Present Illness.     Objective:     Visit Vitals  BP (!) 179/99 (BP 1 Location: Left upper arm, BP Patient Position: Semi fowlers)   Pulse 78   Temp 98 °F (36.7 °C)   Resp 16   Ht 5' 1\" (1.549 m)   Wt 51.3 kg (113 lb)   SpO2 98%   BMI 21.35 kg/m²         Intake/Output Summary (Last 24 hours) at 3/22/2022 1315  Last data filed at 3/22/2022 0309  Gross per 24 hour   Intake 1.4 ml   Output 2573 ml   Net -2571.6 ml       Current Facility-Administered Medications   Medication Dose Route Frequency Provider Last Rate Last Admin    heparin (porcine) 1,000 unit/mL injection 5,000 Units  5,000 Units IntraCATHeter DIALYSIS PRN Jodie Gruber MD        sodium chloride (NS) flush 5-40 mL  5-40 mL IntraVENous Q8H Pollo Patel, DO   10 mL at 03/22/22 1030    sodium chloride (NS) flush 5-40 mL  5-40 mL IntraVENous PRN Crow Barajas DO        acetaminophen (TYLENOL) tablet 650 mg  650 mg Oral Q6H PRN Jed Beenancy MALDONADO DO   650 mg at 03/22/22 1122    Or    acetaminophen (TYLENOL) suppository 650 mg  650 mg Rectal Q6H PRN Pollo Patel DO        polyethylene glycol (MIRALAX) packet 17 g  17 g Oral DAILY PRN Wolm Ewings, DO        ondansetron (ZOFRAN ODT) tablet 4 mg  4 mg Oral Q8H PRN Pollo Patel DO        Or    ondansetron (ZOFRAN) injection 4 mg  4 mg IntraVENous Q6H PRN Salina Zimmera, DO        amLODIPine (NORVASC) tablet 10 mg  10 mg Oral DAILY Pollo Patel DO   10 mg at 03/22/22 1029    carvediloL (COREG) tablet 6.25 mg  6.25 mg Oral BID WITH MEALS Pollo Patel DO   6.25 mg at 03/22/22 1029    losartan (COZAAR) tablet 100 mg  100 mg Oral DAILY Pollo Patel, DO   100 mg at 03/22/22 1029    azithromycin (ZITHROMAX) tablet 1,200 mg  1,200 mg Oral Q7D Pollo Patel DO   1,200 mg at 03/22/22 1037    atovaquone (MEPRON) 750 mg/5 mL oral suspension 750 mg  750 mg Oral DAILY WITH BREAKFAST Pollo Patel DO   750 mg at 03/22/22 1029    nitroglycerin (NITROBID) 2 % ointment 1 Inch  1 Inch Topical Q6H PRN Salina Zimmera, DO        hydrALAZINE (APRESOLINE) 20 mg/mL injection 20 mg  20 mg IntraVENous Q6H PRN Salina Zimmera, DO            Physical Exam:     Physical Exam:   General:  Alert, cooperative, no distress, appears stated age. Neck: Supple, symmetrical, trachea midline, no adenopathy, thyroid: no enlargement/tenderness/nodules, no carotid bruit and no JVD. Lungs:   Clear to auscultation bilaterally. Heart:  Regular rate and rhythm, S1, S2 normal, no murmur, click, rub or gallop. Abdomen:   Soft, non-tender. Bowel sounds normal. No masses,  No organomegaly.    Extremities: Extremities normal, atraumatic, no cyanosis or edema, has AVF on left site,    Skin: Skin color, texture, turgor normal. No rashes or lesions         Data Review:    CBC w/Diff    Recent Labs     03/21/22 2125   WBC 7.2   RBC 2.84*   HGB 8.8*   HCT 28.8*   .4*   MCH 31.0   MCHC 30.6*   RDW 16.3*    Recent Labs     03/21/22 2125   MONOS 13*   EOS 7*   BASOS 1   RDW 16.3*        Comprehensive Metabolic Profile    Recent Labs     03/21/22 2125      K 3.9      CO2 17*   BUN 58*   CREA 9.68*    Recent Labs     03/21/22 2125   CA 8.3*   ALB 3.9   TP 8.9*   TBILI 0.4                        Impression:       Active Hospital Problems    Diagnosis Date Noted    Respiratory failure, acute ([facility] 03/22/2022    Pulmonary edema 03/22/2022    Hypertensive urgency 03/22/2022    AIDS (acquired immune deficiency syndrome) ([facility] 11/08/2021    ESRD (end stage renal disease) on dialysis ([facility] 10/25/2021            Plan: Will dialyze her again today  , check labs.       Tres Au MD

## 2022-03-22 NOTE — H&P
History and Physical    Patient: Keo Cohen MRN: [id number]  SSN: [id number]    YOB: 1960  Age: 63 y.o. Sex: female      Subjective:      Keo Cohen is a 63 y.o. female who presents to [facility] ER with complaint of Shortness of Breath. Patient was noted to arrive in respiratory distress with labored breathing. Patient reported that her breathing issues started 30 minutes after eating dinner. Patient was eventually taken to Dialysis, after which Patient required BiPAP (ESRD on Dialysis T, W, F), despite not having missed any previous appointments. Patient was started on IV Nitroglycerin gtt for Hypertension. Patient was noted to have a SpO2 86-88% on Room Air without exertion. Patient makes off-color comments during the interview and intermittently cries out for help as though she were not in the middle of an interview. Notably, Patient receive Fentanyl 50 mcg prior to my interview and examination. In [facility] ER, Patient is noted to have Blood Pressure 232/133 mm Hg, SpO2 86-88% on Room Air, and SpO2 100% on 2 L/min O2. Patient is Admitted to [facility] Telemetry for management of Acute Respiratory Failure of unknown cause. Past Medical History:   Diagnosis Date    Acute kidney injury ([facility]     AIDS (acquired immune deficiency syndrome) ([facility]     Anemia     Esophageal candidiasis (HCC)     ESRD (end stage renal disease) (HCC)     HCV (hepatitis C virus)     Hepatitis B     Hepatitis C     HIV (human immunodeficiency virus infection) ([facility]     HIV (human immunodeficiency virus infection) ([facility]     Late latent syphilis     Pulmonary TB     Schizoaffective disorder ([facility]     [facility]      Past Surgical History:   Procedure Laterality Date    HX GYN      HX NEPHRECTOMY      left kidney removed at age 17   [de-identified] NEPHRECTOMY        History reviewed. No pertinent family history.   Social History     Tobacco Use    Smoking status: Not on file    Smokeless tobacco: Not on file   Substance Use Topics    Alcohol use: Not on file      Prior to Admission medications    Medication Sig Start Date End Date Taking? Authorizing Provider   amLODIPine (NORVASC) 10 mg tablet Take 1 Tablet by mouth daily. 2/19/22   May Castillo DO   atovaquone (MEPRON) 750 mg/5 mL suspension Take 5 mL by mouth daily (with breakfast) for 30 days. 2/19/22 3/21/22  May Castillo DO   azithromycin (ZITHROMAX) 600 mg tablet Take 2 Tablets by mouth every seven (7) days. 2/21/22   May Castillo DO   carvediloL (COREG) 6.25 mg tablet Take 1 Tablet by mouth two (2) times daily (with meals). 2/18/22   May Castillo DO   losartan (COZAAR) 100 mg tablet Take 1 Tablet by mouth daily. 2/19/22   Danni Waddell DO        Allergies   Allergen Reactions    Ceftriaxone Itching     Rxn in ER recorded 2007      Aspirin Hives    Pcn [Penicillins] Hives       Review of Systems:  Patient makes tangential comments during the interview and intermittent cries out for help as though interview were not there. Patient lacks focus to meaningfully contribute to ROS. Objective:     Vitals:    03/22/22 0745 03/22/22 0800 03/22/22 0815 03/22/22 0910   BP: (!) 138/91 (!) 147/80 (!) 144/82 (!) 162/86   Pulse: 81 74 71 75   Resp: 13 19 15 17   Temp:    98.2 °F (36.8 °C)   TempSrc:       SpO2: 100% 100% 100% 100%   Weight:       Height:            Physical Exam:  General:  Older Adult female lying in bed in no acute distress  HEENT:  Atraumatic, normocephalic; Pupils equally round and reactive to light with accommodation; Extraocular muscles intact; (+) Somewhat Dry Oropharynx with dried whitish exudates trailing out of mouth; (+) NC in place and running 1.5 L./min O2  Chest:  No pectus carinatum;  No pectus excavatum; (+) Right Chest Port  Cardiovascular:  Regular rate and rhythm without rubs, gallops, or murmurs  Respiratory:  (+) Mild Diffuse Crackles; (+) Questionable Rhonchi of Right Lung base  Abdominal:  Soft, non-tense, non-tender abdomen; BS present without guarding, rebound, or masses  :  Deferred  Extremities:  Pulses 2+ x4 without edema, clubbing, or cyanosis  Musculoskeletal:  Strength 5/5 and symmetrical in BUE and BLE  Integument:  No rash on face, forearms, or legs  Neurological:  A&O x4/4; No gross deficits of Visual Acuity, Eye Movement, Jaw Opening, Facial Expression, Hearing, Phonation, or Head Movement; No gross deficits of Tongue Movement or Slurring of Speech  Psychiatric:  Affect is appropriate; Language is present and fluent; Behavior is appropriate      Laboratory Studies:  CMP:   Lab Results   Component Value Date/Time     03/21/2022 09:25 PM    K 3.9 03/21/2022 09:25 PM     03/21/2022 09:25 PM    CO2 17 (L) 03/21/2022 09:25 PM    AGAP 14 03/21/2022 09:25 PM     (H) 03/21/2022 09:25 PM    BUN 58 (H) 03/21/2022 09:25 PM    CREA 9.68 (H) 03/21/2022 09:25 PM    GFRAA 5 (L) 03/21/2022 09:25 PM    GFRNA 4 (L) 03/21/2022 09:25 PM    CA 8.3 (L) 03/21/2022 09:25 PM    MG 2.1 03/21/2022 09:25 PM    ALB 3.9 03/21/2022 09:25 PM    TP 8.9 (H) 03/21/2022 09:25 PM    GLOB 5.0 (H) 03/21/2022 09:25 PM    AGRAT 0.8 03/21/2022 09:25 PM    ALT 21 03/21/2022 09:25 PM     CBC:   Lab Results   Component Value Date/Time    WBC 7.2 03/21/2022 09:25 PM    HGB 8.8 (L) 03/21/2022 09:25 PM    HCT 28.8 (L) 03/21/2022 09:25 PM     03/21/2022 09:25 PM     All Cardiac Markers in the last 24 hours: No results found for: CPK, CK, CKMMB, CKMB, RCK3, CKMBT, CKNDX, CKND1, SG, TROPT, TROIQ, CALOS, TROPT, TNIPOC, BNP, BNPP  Recent Glucose Results:   Lab Results   Component Value Date/Time     (H) 03/21/2022 09:25 PM        Images Reviewed:  XR CHEST PORT    Result Date: 3/21/2022  EXAM: Chest X-Ray INDICATION:  SOB. TECHNIQUE: AP view of the chest COMPARISON: 3/14/2022, 2/13/2022, 10/24/2021 FINDINGS: Tubes and Lines: A hemodialysis catheter is present. This is unchanged.  Pleura: No pneumothorax appreciated. No effusions appreciated. Lungs:  Extensive bilateral interstitial opacities are noted. Cardiac/Mediastinum/Aorta: Cardiac silhouette is enlarged. Pulmonary Vascularity: The pulmonary vasculature is prominent. Chest Wall: No acute finding Osseous Structures: Unremarkable Upper Abdomen: No acute findings appreciated. 1.  Moderate pulmonary edema. 2.  Hemodialysis catheter without evidence of complication. I have personally reviewed the EKG and have found, per my read, Tachycardia, questionable LVH, and QTc Prolongation (562 ms). Assessment:     Hospital Problems  Date Reviewed: 3/22/2022          Codes Class Noted POA    * (Principal) Respiratory failure, acute ([facility] ICD-10-CM: J96.00  ICD-9-CM: 518.81  3/22/2022 Yes        Pulmonary edema ICD-10-CM: J81.1  ICD-9-CM: 967  3/22/2022 Yes        ESRD (end stage renal disease) on dialysis [facility]) ICD-10-CM: N18.6, Z99.2  ICD-9-CM: 585.6, V45.11  10/25/2021 Yes        Hypertensive urgency ICD-10-CM: I16.0  ICD-9-CM: 401.9  3/22/2022 Yes        AIDS (acquired immune deficiency syndrome) ([facility] ICD-10-CM: B20  ICD-9-CM: 353  11/8/2021 Yes              Plan:     Therapeutic O2, Telemetry, Strict I/Os, and Daily Weight. Consult Nephrology. No identified cause of increased O2 requirement---anticipate that Patient remains volume overloaded at this time. Consider additional cause. Continue home medications for HTN and Infection Prophylaxis. DVT mechanoprophylaxis is achieved with SCDs.     Signed By: Arnoldo Huynh,      March 22, 2022

## 2022-03-22 NOTE — PROGRESS NOTES
TRANSFER - IN REPORT:    Verbal report received from Franco Levy RN(name) on Shawn Liu  being received from ED(unit) for routine progression of care      Report consisted of patients Situation, Background, Assessment and   Recommendations(SBAR). Information from the following report(s) Kardex, Intake/Output, MAR and Recent Results was reviewed with the receiving nurse. Opportunity for questions and clarification was provided. Assessment completed upon patients arrival to unit and care assumed.

## 2022-03-22 NOTE — DIALYSIS
ACUTE HEMODIALYSIS TREATMENT    HEMODIALYSIS ORDERS: Physician: Dr. Trish Oleary: Saman   Duration: 3 hr   BFR: 350   DFR: 800   Dialysate:  Temp 36-37*C   K+  3    Ca+ 2.5   Na 138   Bicarb 35   Wt Readings from Last 1 Encounters:   03/21/22 51.3 kg (113 lb)    Patient Chart [x]   Unable to Obtain []  Dry weight/UF Goal: 2000 ml    Heparin []  Bolus    Units    [] Hourly    Units    [x]None       Pre BP:   123/82   Pulse:  92   Respirations: 18   Temp:  97.8  [] Oral [] Axillary [] Esophageal   Labs: []  Pre  []  Post:   [x] N/A   Additional Orders(medications, blood products, hypotension management): [] Yes   [] No     [x]  [facility] Consent Verified     CATHETER ACCESS:  []N/A   [x]Right   []Left   []IJ   []Fem  [x]Chest wall  []TransHepatic   [] First use X-ray verified     [x]Tunnel    [] Non Tunneled   [x]No S/S infection  []Redness  []Drainage []Cultured []Swelling []Pain   [x]Medical Aseptic Prep Utilized   []Dressing Changed  [x] Biopatch  Date: 3/22/22   []Clotted   [x]Patent   Flows: [x]Good  []Poor  []Reversed   If access problem,  notified: []Yes    [x]N/A          GENERAL ASSESSMENT:    LUNGS:  Resp Rate 20   [x] Clear  [] Coarse  [] Crackles  [] Wheezing  [] Diminished         Respirations:  [x]Easy  []Labored  []N/A  Cough:  []Productive  [x]Dry  []N/A      Therapy:  []RA  O2 Type:  [x]NC  Mask: []  NRB    [] BiPaP  Flow:2  l/min                   [] Ventilated  [] Intubated  [] Trach     CARDIAC: [] Regular      [] Irregular   [] Rhythm:          [x] Monitored   [] Bedside   [x] Remotely monitored     EDEMA: [x] None   []Generalized  [] Pitting [] 1+   [] 2 +   [] 3+    [] 4+  [] Pedal    SKIN:   [x] Warm  [] Hot     [] Cold   [x] Dry     [] Pale   [] Diaphoretic                  [] Flushed  [] Jaundiced  [] Cyanotic     LOC:    [x] Alert      [x]Oriented:    [x] Person     [] Place  []Time               [x] Confused  [] Lethargic  [] Medicated  [] Non-responsive  [] Non Verbal GI / ABDOMEN:                     [] Flat    [] Distended    [x] Soft    [] Firm   []  Obese                   [] Diarrhea   [] FMS [x] Bowel Sounds  [] Nausea  [] Vomiting                   [] NGT  [] OGT    [] PEG  [] Tube Feedings @     / URINE ASSESSMENT:                   [] Voiding  []  Aragon  [x] Oliguria  [] Anuria                     [] Incontinent  []  Incontinent Brief   []  PureWick     PAIN:  [x] 0 []1  []2   []3   []4   []5   []6   []7   []8   []9   []10                MOBILITY:  [x] Bed    [] Stretcher      All Vitals and Treatment Details on Attached [address]: [facility]          Room # 459/01    [x] Routine         [] 1st Time Acute/Chronic   [] Urgent      [] Stat            [] Acute Room   [x]  Bedside    [] ICU/CCU     [] ER   Isolation Precautions:  [x] Dialysis    There are currently no Active Isolations     ALLERGIES:     Allergies   Allergen Reactions    Ceftriaxone Itching     Rxn in ER recorded 2007      Aspirin Hives    Pcn [Penicillins] Hives      Code Status:  Full Code     Hepatitis Status      Lab Results   Component Value Date/Time    Hepatitis B surface Ag 0.15 02/14/2022 03:20 PM    Hepatitis B surface Ab 115.39 02/14/2022 03:20 PM    Hep B Core Ab, IgM Negative 02/17/2022 08:22 PM    Hep B Core Ab, total Positive (A) 02/17/2022 08:22 PM        Current Labs:      Lab Results   Component Value Date/Time    WBC 7.2 03/21/2022 09:25 PM    HGB 8.8 (L) 03/21/2022 09:25 PM    HCT 28.8 (L) 03/21/2022 09:25 PM    PLATELET 540 59/61/8741 09:25 PM    .4 (H) 03/21/2022 09:25 PM     Lab Results   Component Value Date/Time    Sodium 137 03/21/2022 09:25 PM    Potassium 3.9 03/21/2022 09:25 PM    Chloride 106 03/21/2022 09:25 PM    CO2 17 (L) 03/21/2022 09:25 PM    Anion gap 14 03/21/2022 09:25 PM    Glucose 186 (H) 03/21/2022 09:25 PM    BUN 58 (H) 03/21/2022 09:25 PM    Creatinine 9.68 (H) 03/21/2022 09:25 PM    BUN/Creatinine ratio 6 (L) 03/21/2022 09:25 PM    GFR est AA 5 (L) 03/21/2022 09:25 PM    GFR est non-AA 4 (L) 03/21/2022 09:25 PM    Calcium 8.3 (L) 03/21/2022 09:25 PM          DIET:  DIET ADULT     PRIMARY NURSE REPORT:   Pre Dialysis: Mary Jo Quinones RN    Time: 1600      EDUCATION:    [x] Patient           Knowledge Basis: [x]None []Minimal [] Substantial [] Unknown  Barriers to learning confused []N/A  [] Intubated/Trached/Ventilated  [] Sedated/Paralyzed   [] Access Care     [] S&S of infection  [] Fluid Management  [] K+   [x] Procedural    [] Medications   [] Tx Options   [] Transplant   [] Diet      Teaching Tools:  [x] Explain  [] Demo  [] Handouts [] Video  Patient response: [] Verbalized understanding   [x] Requires follow up        [x] Time Out/Safety Check    [x] Extracorporeal Circuit Tested for integrity       RO/HEMODIALYSIS MACHINE SAFETY CHECKS  Before each treatment:        [facility]                                        [] Portable Machine #1/RO serial # [id number]                                                                                                    Alarm Test:  Pass time 4393            [x] RO/Machine Log Complete    Machine Temp    36-37*C             Dialysate: pH 7.4    Conductivity: Meter 14   HD Machine  13.9     TCD: 13.9  Dialyzer Lot # [id number]     Blood Tubing Lot # [id number]     Saline Lot # [id number]     CHLORINE TESTING-Before each treatment and every 4 hours    Total Chlorine: [x] less than 0.1 ppm  Initial Time Check: 1600       4 Hr/2nd Check Time:    (if greater than 0.1 ppm from Primary then every 30 minutes from Secondary)     TREATMENT INITIATION  with Dialysis Precautions:   [x] All Connections Secured              [x] Saline Line Double Clamped   [x] Venous Parameters Set               [x] Arterial Parameters Set    [x] Prime Given 250ml NSS              [x]Air Foam Detector Engaged      Treatment Initiation Note:  Received Patient in her room in bed alert but confused. VSS. stable for treatment.  Right subclavian [address] Accessed with no signs or symptoms of complication. Treatment initiated      During Treatment Notes:  [address] with Dr. Marcie Coon in regards to target fluid, wants uf goal reduce to 2000 ml   1645  Vascular access visible with arterial and venous line connections intact. Pt resting comfortably. 1700  Vascular access visible with arterial and venous line connections intact. Pt resting comfortably. 1715  Vascular access visible with arterial and venous line connections intact. Pt resting comfortably. 1730  Vascular access visible with arterial and venous line connections intact. Pt resting comfortably. 1745  Vascular access visible with arterial and venous line connections intact. Pt resting comfortably. 1800  Vascular access visible with arterial and venous line connections intact. Pt resting comfortably. 1815  Pain med given by primary nurse for left leg pain. 1830  Vascular access visible with arterial and venous line connections intact. Pt resting comfortably. 1845  Vascular access visible with arterial and venous line connections intact. Pt resting comfortably. 1900  Vascular access visible with arterial and venous line connections intact. Pt resting comfortably. 1907  Treatment terminated due patient c/o of feeling light headed/cramping.        Medication Dose Volume Route Time Jasmyne Nurse, Title   heparin 1000 1ml HD 1620 Cata Reyes, DAYA Reyes RN      HD  Cata Reyes RN     Post Assessment  Dialyzer Cleared:   [] Good  [x] Fair  [] Poor  Blood processed:  51 L  UF Removed:  1500 Ml    Post /82   Pulse  73 Resp  20  Temp 97.7 Lungs: [] Clear [] Course  [] Crackles                 []  Wheezing   [x] Diminished   Post Tx Vascular Access: [] N/A Cardiac :[] Regular   [] Irregular   Rhythm:  [x] Monitored   [] Not Monitored    CVC Catheter: [] N/A  Locking solution: Heparin 1:1000 U  Arterial port 1.9 ml   Venous port 2.0 ml   Edema:  [x] None  [] Generalized Skin:[x] Warm  [x] Dry [] Diaphoretic               [] Flushed  [] Pale [] Cyanotic Pain:  [x]0  []1 []2  []3 []4  []5  []6  []7 []8    []9  []10     Post Treatment Note:   Treatment terminated 20 mins early due to Patient complain of cramping and light headedness. 1500 ml of fluid removed. Symptoms resolve after blood return. Patient remains in her room in stable condition.        POST TREATMENT PRIMARY NURSE HANDOFF REPORT:   Post Dialysis: Mary Jo Quinones RN               Time:  1670       Abbreviations: AVG-arterial venous graft, AVF-arterial venous fistula, IJ-Internal Jugular, Subcl-Subclavian, Fem-Femoral, Tx-treatment, AP/HR-apical heart rate, VSS- Vital Signs Stable, CVC- Central Venous Catheter, DFR-dialysate flow rate, BFR-blood flow rate, AP-arterial pressure, -venous pressure, UF-ultrafiltrate, TMP-transmembrane pressure, Vic-Venous, Art-Arterial, RO-Reverse Osmosis

## 2022-03-22 NOTE — ED TRIAGE NOTES
Pt to ED with complaints of SOB that started 30 minutes ago after eating dinner, pt is on Dialysis Tues, Wed, and Friday, denies missing any sessions.

## 2022-03-22 NOTE — PROGRESS NOTES
Physician Progress Note      PATIENTTerence Kyree  CSN #:                  [id number]  :                       1960  ADMIT DATE:       3/21/2022 9:07 PM  DISCH DATE:  RESPONDING  PROVIDER #:        Jenny Faria MD          QUERY TEXT:    Dear Hospitalist  Pt admitted with SOB. Pt noted to have pulmonary  edema. If possible, please document in the progress notes and discharge summary if you are evaluating and/or treating any of the following: The medical record reflects the following:  Risk Factors: ESRD  on  HD, no missed sessions  Clinical Indicators: cxr- moderate pulmonary edema  . Pulmonary  edema on  H&P  per renal- ESRD patient presented with Acute SOB, volume overload  Treatment: urgent dialysis session    Thank you,   Marge Jacob RN   BSN    CCDS  [email or web address]  Options provided:  -- Acute pulmonary edema due to heart failure  -- Chronic pulmonary edema due to heart failure  -- Noncardiogenic acute pulmonary edema due to fluid overload  -- Other - I will add my own diagnosis  -- Disagree - Not applicable / Not valid  -- Disagree - Clinically unable to determine / Unknown  -- Refer to Clinical Documentation Reviewer    PROVIDER RESPONSE TEXT:    This patient has noncardiogenic acute pulmonary edema due to fluid overload .     Query created by: Maryjo Arnold on 3/22/2022 1:54 PM      Electronically signed by:  Jenny Faria MD 3/22/2022 3:29 PM

## 2022-03-23 VITALS
WEIGHT: 113 LBS | DIASTOLIC BLOOD PRESSURE: 80 MMHG | SYSTOLIC BLOOD PRESSURE: 126 MMHG | RESPIRATION RATE: 20 BRPM | BODY MASS INDEX: 21.34 KG/M2 | HEART RATE: 81 BPM | OXYGEN SATURATION: 100 % | TEMPERATURE: 98.6 F | HEIGHT: 61 IN

## 2022-03-23 LAB
ALBUMIN SERPL-MCNC: 3.6 G/DL (ref 3.4–5)
ALBUMIN/GLOB SERPL: 0.7 {RATIO} (ref 0.8–1.7)
ALP SERPL-CCNC: 119 U/L (ref 45–117)
ALT SERPL-CCNC: 19 U/L (ref 13–56)
ANION GAP SERPL CALC-SCNC: 7 MMOL/L (ref 3–18)
AST SERPL-CCNC: 23 U/L (ref 10–38)
BASOPHILS # BLD: 0 K/UL (ref 0–0.1)
BASOPHILS NFR BLD: 1 % (ref 0–2)
BILIRUB SERPL-MCNC: 0.4 MG/DL (ref 0.2–1)
BUN SERPL-MCNC: 16 MG/DL (ref 7–18)
BUN/CREAT SERPL: 4 (ref 12–20)
CALCIUM SERPL-MCNC: 9.6 MG/DL (ref 8.5–10.1)
CHLORIDE SERPL-SCNC: 96 MMOL/L (ref 100–111)
CO2 SERPL-SCNC: 29 MMOL/L (ref 21–32)
CREAT SERPL-MCNC: 4.12 MG/DL (ref 0.6–1.3)
DIFFERENTIAL METHOD BLD: ABNORMAL
EOSINOPHIL # BLD: 0.2 K/UL (ref 0–0.4)
EOSINOPHIL NFR BLD: 5 % (ref 0–5)
ERYTHROCYTE [DISTWIDTH] IN BLOOD BY AUTOMATED COUNT: 16 % (ref 11.6–14.5)
GLOBULIN SER CALC-MCNC: 5 G/DL (ref 2–4)
GLUCOSE SERPL-MCNC: 94 MG/DL (ref 74–99)
HCT VFR BLD AUTO: 26.4 % (ref 35–45)
HGB BLD-MCNC: 8.2 G/DL (ref 12–16)
IMM GRANULOCYTES # BLD AUTO: 0 K/UL (ref 0–0.04)
IMM GRANULOCYTES NFR BLD AUTO: 1 % (ref 0–0.5)
LYMPHOCYTES # BLD: 0.6 K/UL (ref 0.9–3.6)
LYMPHOCYTES NFR BLD: 14 % (ref 21–52)
MCH RBC QN AUTO: 30.1 PG (ref 24–34)
MCHC RBC AUTO-ENTMCNC: 31.1 G/DL (ref 31–37)
MCV RBC AUTO: 97.1 FL (ref 78–100)
MONOCYTES # BLD: 0.5 K/UL (ref 0.05–1.2)
MONOCYTES NFR BLD: 12 % (ref 3–10)
NEUTS SEG # BLD: 2.8 K/UL (ref 1.8–8)
NEUTS SEG NFR BLD: 68 % (ref 40–73)
NRBC # BLD: 0 K/UL (ref 0–0.01)
NRBC BLD-RTO: 0 PER 100 WBC
PHOSPHATE SERPL-MCNC: 5.1 MG/DL (ref 2.5–4.9)
PLATELET # BLD AUTO: 253 K/UL (ref 135–420)
PMV BLD AUTO: 9.9 FL (ref 9.2–11.8)
POTASSIUM SERPL-SCNC: 3.8 MMOL/L (ref 3.5–5.5)
PROT SERPL-MCNC: 8.6 G/DL (ref 6.4–8.2)
RBC # BLD AUTO: 2.72 M/UL (ref 4.2–5.3)
SODIUM SERPL-SCNC: 132 MMOL/L (ref 136–145)
WBC # BLD AUTO: 4.2 K/UL (ref 4.6–13.2)

## 2022-03-23 PROCEDURE — 97161 PT EVAL LOW COMPLEX 20 MIN: CPT

## 2022-03-23 PROCEDURE — 85025 COMPLETE CBC W/AUTO DIFF WBC: CPT

## 2022-03-23 PROCEDURE — 74011000250 HC RX REV CODE- 250: Performed by: INTERNAL MEDICINE

## 2022-03-23 PROCEDURE — G0378 HOSPITAL OBSERVATION PER HR: HCPCS

## 2022-03-23 PROCEDURE — 74011250637 HC RX REV CODE- 250/637: Performed by: HOSPITALIST

## 2022-03-23 PROCEDURE — 74011250637 HC RX REV CODE- 250/637: Performed by: FAMILY MEDICINE

## 2022-03-23 PROCEDURE — 36415 COLL VENOUS BLD VENIPUNCTURE: CPT

## 2022-03-23 PROCEDURE — 97166 OT EVAL MOD COMPLEX 45 MIN: CPT

## 2022-03-23 PROCEDURE — 84100 ASSAY OF PHOSPHORUS: CPT

## 2022-03-23 PROCEDURE — 99239 HOSP IP/OBS DSCHRG MGMT >30: CPT | Performed by: HOSPITALIST

## 2022-03-23 PROCEDURE — 74011250637 HC RX REV CODE- 250/637: Performed by: INTERNAL MEDICINE

## 2022-03-23 PROCEDURE — 97535 SELF CARE MNGMENT TRAINING: CPT

## 2022-03-23 PROCEDURE — 80053 COMPREHEN METABOLIC PANEL: CPT

## 2022-03-23 RX ORDER — QUETIAPINE FUMARATE 25 MG/1
25 TABLET, FILM COATED ORAL
COMMUNITY
Start: 2022-02-22

## 2022-03-23 RX ORDER — CALCIUM ACETATE 667 MG/1
2 CAPSULE ORAL
COMMUNITY
Start: 2022-02-03

## 2022-03-23 RX ORDER — SULFAMETHOXAZOLE AND TRIMETHOPRIM 800; 160 MG/1; MG/1
2 TABLET ORAL
COMMUNITY
Start: 2022-02-22 | End: 2022-03-23

## 2022-03-23 RX ORDER — ATOVAQUONE 750 MG/5ML
1500 SUSPENSION ORAL
Qty: 300 ML | Refills: 0 | Status: SHIPPED | OUTPATIENT
Start: 2022-03-23 | End: 2022-04-22

## 2022-03-23 RX ORDER — POTASSIUM CHLORIDE 20 MEQ/1
40 TABLET, EXTENDED RELEASE ORAL
Status: COMPLETED | OUTPATIENT
Start: 2022-03-23 | End: 2022-03-23

## 2022-03-23 RX ADMIN — LOSARTAN POTASSIUM 100 MG: 50 TABLET, FILM COATED ORAL at 08:21

## 2022-03-23 RX ADMIN — SODIUM CHLORIDE, PRESERVATIVE FREE 10 ML: 5 INJECTION INTRAVENOUS at 14:10

## 2022-03-23 RX ADMIN — HYDROCODONE BITARTRATE AND ACETAMINOPHEN 2 TABLET: 5; 325 TABLET ORAL at 08:21

## 2022-03-23 RX ADMIN — AMLODIPINE BESYLATE 10 MG: 10 TABLET ORAL at 08:21

## 2022-03-23 RX ADMIN — SODIUM CHLORIDE, PRESERVATIVE FREE 10 ML: 5 INJECTION INTRAVENOUS at 05:23

## 2022-03-23 RX ADMIN — ATOVAQUONE 750 MG: 750 SUSPENSION ORAL at 08:21

## 2022-03-23 RX ADMIN — CARVEDILOL 6.25 MG: 6.25 TABLET, FILM COATED ORAL at 08:21

## 2022-03-23 RX ADMIN — POTASSIUM CHLORIDE 40 MEQ: 20 TABLET, EXTENDED RELEASE ORAL at 14:10

## 2022-03-23 RX ADMIN — HYDROCODONE BITARTRATE AND ACETAMINOPHEN 2 TABLET: 5; 325 TABLET ORAL at 00:17

## 2022-03-23 NOTE — PROGRESS NOTES
Bedside and Verbal shift change report given to Marin Dobson RN   (oncoming nurse) by Jessica Marcial RN (offgoing nurse). Report included the following information SBAR, Kardex, Intake/Output and Recent Results.

## 2022-03-23 NOTE — DISCHARGE INSTRUCTIONS
DISCHARGE SUMMARY from Nurse    PATIENT INSTRUCTIONS:    After general anesthesia or intravenous sedation, for 24 hours or while taking prescription Narcotics:  · Limit your activities  · Do not drive and operate hazardous machinery  · Do not make important personal or business decisions  · Do  not drink alcoholic beverages  · If you have not urinated within 8 hours after discharge, please contact your surgeon on call. Report the following to your surgeon:  · Excessive pain, swelling, redness or odor of or around the surgical area  · Temperature over 100.5  · Nausea and vomiting lasting longer than 4 hours or if unable to take medications  · Any signs of decreased circulation or nerve impairment to extremity: change in color, persistent  numbness, tingling, coldness or increase pain  · Any questions    What to do at Home:  Recommended activity: Activity as tolerated, ***    If you experience any of the following symptoms chest pain, shortness of breath, fever greater than 100.5, nausea, vomiting, pain unrelieved by medication, please follow up with Chet Preskieran TURPIN.    *  Please give a list of your current medications to your Primary Care Provider. *  Please update this list whenever your medications are discontinued, doses are      changed, or new medications (including over-the-counter products) are added. *  Please carry medication information at all times in case of emergency situations. These are general instructions for a healthy lifestyle:    No smoking/ No tobacco products/ Avoid exposure to second hand smoke  Surgeon General's Warning:  Quitting smoking now greatly reduces serious risk to your health.     Obesity, smoking, and sedentary lifestyle greatly increases your risk for illness    A healthy diet, regular physical exercise & weight monitoring are important for maintaining a healthy lifestyle    You may be retaining fluid if you have a history of heart failure or if you experience any of the following symptoms:  Weight gain of 3 pounds or more overnight or 5 pounds in a week, increased swelling in our hands or feet or shortness of breath while lying flat in bed. Please call your doctor as soon as you notice any of these symptoms; do not wait until your next office visit. Patient {JUNIE:29045}  MyChart Activation    Thank you for requesting access to [facility]. Please follow the instructions below to securely access and download your online medical record. [facility] allows you to send messages to your doctor, view your test results, renew your prescriptions, schedule appointments, and more. How Do I Sign Up? 1. In your internet browser, go to [email or web address]  2. Click on the First Time User? Click Here link in the Sign In box. You will be redirect to the New Member Sign Up page. 3. Enter your [facility] Access Code exactly as it appears below. You will not need to use this code after youve completed the sign-up process. If you do not sign up before the expiration date, you must request a new code. [facility] Access Code: Activation code not generated  Current [facility] Status: Active (This is the date your [facility] access code will )    4. Enter the last four digits of your Social Security Number (xxxx) and Date of Birth (mm/dd/yyyy) as indicated and click Submit. You will be taken to the next sign-up page. 5. Create a [facility] ID. This will be your [facility] login ID and cannot be changed, so think of one that is secure and easy to remember. 6. Create a [facility]rish password. You can change your password at any time. 7. Enter your Password Reset Question and Answer. This can be used at a later time if you forget your password. 8. Enter your e-mail address. You will receive e-mail notification when new information is available in [address] 9. Click Sign Up. You can now view and download portions of your medical record.   10. Click the Download Summary menu link to download a portable copy of your medical information. Additional Information    If you have questions, please visit the Frequently Asked Questions section of the [employer] website at [email or web address] Remember, [employer] is NOT to be used for urgent needs. For medical emergencies, dial 911. The discharge information has been reviewed with the {PATIENT PARENT GUARDIAN:37413}. The {PATIENT PARENT GUARDIAN:71696} verbalized understanding. Discharge medications reviewed with the {Dishcarge meds reviewed DTXL:49969} and appropriate educational materials and side effects teaching were provided. ___________________________________________________________________________________________________________________________________  DISCHARGE SUMMARY from Nurse    PATIENT INSTRUCTIONS:    After general anesthesia or intravenous sedation, for 24 hours or while taking prescription Narcotics:  · Limit your activities  · Do not drive and operate hazardous machinery  · Do not make important personal or business decisions  · Do  not drink alcoholic beverages  · If you have not urinated within 8 hours after discharge, please contact your surgeon on call. Report the following to your surgeon:  · Excessive pain, swelling, redness or odor of or around the surgical area  · Temperature over 100.5  · Nausea and vomiting lasting longer than 4 hours or if unable to take medications  · Any signs of decreased circulation or nerve impairment to extremity: change in color, persistent  numbness, tingling, coldness or increase pain  · Any questions    What to do at Home:  Recommended activity: Activity as tolerated, ***    If you experience any of the following symptoms chest pain, shortness of breath, fever greater than 100.5, nausea, vomiting, pain unrelieved by medication, please follow up with Dr Manuelito Brandt. *  Please give a list of your current medications to your Primary Care Provider.     *  Please update this list whenever your medications are discontinued, doses are      changed, or new medications (including over-the-counter products) are added. *  Please carry medication information at all times in case of emergency situations. These are general instructions for a healthy lifestyle:    No smoking/ No tobacco products/ Avoid exposure to second hand smoke  Surgeon General's Warning:  Quitting smoking now greatly reduces serious risk to your health. Obesity, smoking, and sedentary lifestyle greatly increases your risk for illness    A healthy diet, regular physical exercise & weight monitoring are important for maintaining a healthy lifestyle    You may be retaining fluid if you have a history of heart failure or if you experience any of the following symptoms:  Weight gain of 3 pounds or more overnight or 5 pounds in a week, increased swelling in our hands or feet or shortness of breath while lying flat in bed. Please call your doctor as soon as you notice any of these symptoms; do not wait until your next office visit. Patient armband removed and shredded  [employer] Activation    Thank you for requesting access to [facility]. Please follow the instructions below to securely access and download your online medical record. FullStory allows you to send messages to your doctor, view your test results, renew your prescriptions, schedule appointments, and more. How Do I Sign Up?    11. In your internet browser, go to [email or web address]  12. Click on the First Time User? Click Here link in the Sign In box. You will be redirect to the New Member Sign Up page. 15. Enter your [facility]Story Access Code exactly as it appears below. You will not need to use this code after youve completed the sign-up process. If you do not sign up before the expiration date, you must request a new code. [facility]Story Access Code: Activation code not generated  Current [facility]Story Status: Active (This is the date your [facility]Story access code will )    14.  Enter the last four digits of your Social Security Number (xxxx) and Date of Birth (mm/dd/yyyy) as indicated and click Submit. You will be taken to the next sign-up page. 15. Create a BestTravelWebsites ID. This will be your BestTravelWebsites login ID and cannot be changed, so think of one that is secure and easy to remember. 12. Create a BestTravelWebsites password. You can change your password at any time. 16. Enter your Password Reset Question and Answer. This can be used at a later time if you forget your password. 25. Enter your e-mail address. You will receive e-mail notification when new information is available in [address] 19. Click Sign Up. You can now view and download portions of your medical record. 20. Click the Download Summary menu link to download a portable copy of your medical information. Additional Information    If you have questions, please visit the Frequently Asked Questions section of the BestTravelWebsites website at [email or web address] Remember, BestTravelWebsites is NOT to be used for urgent needs. For medical emergencies, dial 911. The discharge information has been reviewed with the {PATIENT PARENT GUARDIAN:55847}. The {PATIENT PARENT GUARDIAN:43105} verbalized understanding. Discharge medications reviewed with the {Dishcarge meds reviewed [facility]} and appropriate educational materials and side effects teaching were provided.   ___________________________________________________________________________________________________________________________________

## 2022-03-23 NOTE — PROGRESS NOTES
Problem: Self Care Deficits Care Plan (Adult)  Goal: *Acute Goals and Plan of Care (Insert Text)  Description: Occupational Therapy Goals  Initiated 3/23/2022 within 7 day(s). 1.  Patient will perform grooming with modified independence. 2.  Patient will perform bathing with modified independence. 3.  Patient will perform toilet transfers with modified independence using RW with good safety awareness. 4.  Patient will perform all aspects of toileting with modified independence. 5.  Patient will participate in upper extremity therapeutic exercise/activities with modified independence for 8 minutes. 6.  Patient will utilize energy conservation techniques during functional activities with min verbal cues. Prior Level of Function: Pt santiago historian, reports family assists her as needed     Outcome: Progressing Towards Goal   OCCUPATIONAL THERAPY EVALUATION    Patient: Keo Cohen (88 y.o. female)  Date: 3/23/2022  Primary Diagnosis: Respiratory failure, acute ([facility] [J96.00]  Hypoxia [R09.02]  Pulmonary edema [J81.1]        Precautions:   Fall  PLOF: Pt santiago diazian, reports family assists her as needed     ASSESSMENT :  Nursing/RN cleared for pt to participate in OT evaluation and tx session. Patient presents lying semi-reclined in bed, A & O x  1 - re-orientated to place, date and situation, no c/o pain. Bed mobility: Mod I supine <-> sit edge of bed. LB dressing: Mod I doff and don slipper socks seated edge of bed using cross leg method. Toilet transfer: Supv-Mod I with vc's for safety with use of RW for balance. Pt washing hands in stance at sink w/ Mod I -Supv, nursing notified of pt's level of assist with toilet transfer requiring vc's for safety with use of RW for balance and fall prevention. Patient lying semi reclined in bed, call bell within reach & pt verbalized understanding and provided return demonstration to utilize for assist e.g. functional transfers in order to prevent falls. Patient will benefit from skilled intervention to address the above impairments. Patient's rehabilitation potential is considered to be Good  Factors which may influence rehabilitation potential include:   []             None noted  []             Mental ability/status  [x]             Medical condition  []             Home/family situation and support systems  [x]             Safety awareness  []             Pain tolerance/management  []             Other:      PLAN :  Recommendations and Planned Interventions:   [x]               Self Care Training                  [x]      Therapeutic Activities  [x]               Functional Mobility Training   [x]      Cognitive Retraining  [x]               Therapeutic Exercises           [x]      Endurance Activities  [x]               Balance Training                    [x]      Neuromuscular Re-Education  []               Visual/Perceptual Training     [x]      Home Safety Training  [x]               Patient Education                   [x]      Family Training/Education  []               Other (comment):    Frequency/Duration: Patient will be followed by occupational therapy 3-5 times a week to address goals. Discharge Recommendations: Home Health w/ continued 24/7 supv and assist e.g. ADLs, IADLs, meal prep  Further Equipment Recommendations for Discharge: bedside commode, grab bars, shower chair, and rolling walker     SUBJECTIVE:   Patient stated I am right handed.     OBJECTIVE DATA SUMMARY:     Past Medical History:   Diagnosis Date    Acute kidney injury ([facility] [address]     AIDS (acquired immune deficiency syndrome) ([facility] [address]     Anemia     Esophageal candidiasis (HCC)     ESRD (end stage renal disease) (HCC)     HCV (hepatitis C virus)     Hepatitis B     Hepatitis C     HIV (human immunodeficiency virus infection) ([facility] [address]     HIV ([facility] immunodeficiency virus infection) ([facility]     Late latent syphilis     Pulmonary TB     Schizoaffective disorder ([facility]     Substance abuse [facility])      Past Surgical History:   Procedure Laterality Date    HX GYN      HX NEPHRECTOMY      left kidney removed at age 15    HX NEPHRECTOMY       Barriers to Learning/Limitations: yes;  cognitive and altered mental status (i.e.Sedation, Confusion)  Compensate with: visual, verbal, tactile, kinesthetic cues/model    Home Situation:   Home Situation  Home Environment: Private residence  # Steps to Enter: 3  One/Two Story Residence: Two story  # of Interior Steps: 17  Living Alone: No  Support Systems: Other Family Member(s),Child(joanne)  Patient Expects to be Discharged to[de-identified] Home with home health  Current DME Used/Available at Home: Walker  Tub or Shower Type: Tub/Shower combination  []  Right hand dominant   []  Left hand dominant    Cognitive/Behavioral Status:  Neurologic State: Alert;Confused  Orientation Level: Oriented to person;Disoriented to situation;Disoriented to place; Disoriented to time  Cognition: Follows commands  Safety/Judgement: Fall prevention    Skin: appears intact    Edema: none noted    Vision/Perceptual:  appears intact, able to tell correct time on wall clock       Coordination: BUE  Coordination: Within functional limits  Fine Motor Skills-Upper: Left Intact; Right Intact    Gross Motor Skills-Upper: Left Intact; Right Intact    Balance:  Sitting - Static: Good (unsupported)  Standing: Intact; With support  Standing - Static: Good  Standing - Dynamic : Good    Strength: BUE  Strength:  Within functional limits     Tone & Sensation: BUE  Tone: Normal    Range of Motion: BUE  AROM: Within functional limits     Functional Mobility and Transfers for ADLs:  Bed Mobility:  Supine to Sit: Modified independent  Sit to Supine: Modified independent  Scooting: Modified independent  Transfers:  Sit to Stand: Modified independent  Stand to Sit: Modified independent   Toilet Transfer : Supervision;Modified independent   Bathroom Mobility: Supervision/set up;Modified independent    ADL Assessment:   Feeding: Modified independent    Oral Facial Hygiene/Grooming: Modified Independent    Bathing: Modified independent;Supervision    Upper Body Dressing: Modified independent    Lower Body Dressing: Modified independent    Toileting: Modified independent;Supervision    ADL Intervention:  Grooming  Position Performed: Standing (using RW)  Washing Hands: Modified independent;Supervision  Lower Body Dressing Assistance  Socks: Modified independent  Leg Crossed Method Used: Yes  Position Performed: Seated edge of bed  Cognitive Retraining  Safety/Judgement: Fall prevention    Pain:  Pain level pre-treatment: 0/10   Pain level post-treatment: 0/10   Activity Tolerance:   good  Please refer to the flowsheet for vital signs taken during this treatment. After treatment:   [] Patient left in no apparent distress sitting up in chair  [x] Patient left in no apparent distress in bed  [x] Call bell left within reach  [x] Nursing notified  [] Caregiver present  [x] Bed alarm activated    COMMUNICATION/EDUCATION:   [x] Role of Occupational Therapy in the acute care setting  [x] Home safety education was provided and the patient/caregiver indicated understanding. [x] Patient/family have participated as able in goal setting and plan of care. [x] Patient/family agree to work toward stated goals and plan of care. [] Patient understands intent and goals of therapy, but is neutral about his/her participation. [] Patient is unable to participate in goal setting and plan of care. Thank you for this referral.  Alejandro Gibbons  Time Calculation: 17 mins    Eval Complexity: History: MEDIUM Complexity : Expanded review of history including physical, cognitive and psychosocial  history ; Examination: MEDIUM Complexity : 3-5 performance deficits relating to physical, cognitive , or psychosocial skils that result in activity limitations and / or participation restrictions;    Decision Making:MEDIUM Complexity : Patient may present with comorbidities that affect occupational performnce.  Miniml to moderate modification of tasks or assistance (eg, physical or verbal ) with assesment(s) is necessary to enable patient to complete evaluation

## 2022-03-23 NOTE — DISCHARGE SUMMARY
Discharge Summary    Patient: Arun Pompa MRN: [id number]  CSN: [id number]    YOB: 1960  Age: 63 y.o.   Sex: female    DOA: 3/21/2022 LOS:  LOS: 1 day   Discharge Date:      Admission Diagnoses: Respiratory failure, acute ([place] [address] [J96.00]  Hypoxia [R09.02]  Pulmonary edema [J81.1]    Discharge Diagnoses:    Problem List as of 3/23/2022 Date Reviewed: 3/22/2022          Codes Class Noted - Resolved    * (Principal) Respiratory failure, acute ([place] [address] ICD-10-CM: J96.00  ICD-9-CM: 518.81  3/22/2022 - Present        Pulmonary edema ICD-10-CM: J81.1  ICD-9-CM: 536  3/22/2022 - Present        Hypertensive urgency ICD-10-CM: I16.0  ICD-9-CM: 401.9  3/22/2022 - Present        Cardiac volume overload ICD-10-CM: E87.79  ICD-9-CM: 276.69  3/15/2022 - Present        Anemia ICD-10-CM: D64.9  ICD-9-CM: 285.9  3/15/2022 - Present        Secondary hyperparathyroidism of renal origin ([place] [address] ICD-10-CM: N25.81  ICD-9-CM: 588.81  2/14/2022 - Present        AIDS (acquired immune deficiency syndrome) ([place] [address] ICD-10-CM: B20  ICD-9-CM: 236  11/8/2021 - Present        Hepatitis B ICD-10-CM: B19.10  ICD-9-CM: 070.30  11/8/2021 - Present        Chronic hepatitis C without hepatic coma ([place] [address] ICD-10-CM: B18.2  ICD-9-CM: 070.54  11/8/2021 - Present        Schizophrenia ([place] [address] ICD-10-CM: F20.9  ICD-9-CM: 295.90  11/8/2021 - Present        History of cocaine abuse ([place] [address] ICD-10-CM: F14.11  ICD-9-CM: 305.63  11/8/2021 - Present        ESRD (end stage renal disease) on dialysis [facility]) ICD-10-CM: N18.6, Z99.2  ICD-9-CM: 585.6, V45.11  10/25/2021 - Present        Volume overload ICD-10-CM: E87.70  ICD-9-CM: 276.69  10/25/2021 - Present        Respiratory failure ([address] ICD-10-CM: J96.90  ICD-9-CM: 518.81  10/25/2021 - Present        Allergic reaction ICD-10-CM: T78.40XA  ICD-9-CM: 995.3  11/8/2014 - Present        RESOLVED: Hypoxia ICD-10-CM: R09.02  ICD-9-CM: 799.02  10/25/2021 - 3/22/2022        RESOLVED: Hypertension ICD-10-CM: I10  ICD-9-CM: 401.9  10/25/2021 - 3/22/2022            Reason for Admission  63 y.o. female who presents to [facility] ER with complaint of Shortness of Breath. Patient was noted to arrive in respiratory distress with labored breathing. Patient reported that her breathing issues started 30 minutes after eating dinner. Patient was eventually taken to Dialysis, after which Patient required BiPAP (ESRD on Dialysis T, W, F), despite not having missed any previous appointments. Patient was started on IV Nitroglycerin gtt for Hypertension. Patient was noted to have a SpO2 86-88% on Room Air without exertion.     Patient made inappropriate comments during the interview and intermittently cried out for help as though she were not in the middle of an interview. Notably, Patient receive Fentanyl 50 mcg prior to my interview and examination.     In [facility] ER, Patient noted to have Blood Pressure 232/133 mm Hg, SpO2 86-88% on Room Air, and SpO2 100% on 2 L/min O2. Discharge Condition: Fair    PHYSICAL EXAM   Visit Vitals  /63 (BP Patient Position: At rest)   Pulse 75   Temp 98.1 °F (36.7 °C)   Resp 22   Ht 5' 1\" (1.549 m)   Wt 51.3 kg (113 lb)   SpO2 100%   BMI 21.35 kg/m²     General:  In NAD. Nontoxic-appearing. Cardiovascular:  RRR. Pulmonary:  Lungs clear bilaterally, no wheezes. No accessory muscle use. GI:  Abdomen soft, NTTP. Extremities:  Warm, no edema or ischemia. Neuro:  Awake and alert. Moves extremities spontaneously, motor grossly nonfocal.    Hospital Course:   Acute hypoxic respiratory failure requiring BiPAP, resolved s/p dialysis, bipap. Not requiring oxygen at this time. ESRD, HD dependent. Done per nephrology. Volume overload resolved s/p dialysis. HIV/AIDS - outpatient f/u with ID  Chronic hepatitis B  Hypertension controlled on current meds  Anemia of ckd. Hyperglycemia improved. Medical noncompliance. Patient counseled at bedside regarding the importance of f/u.   Substance abuse history  Full code. Patient is not homebound and no home health care needs were identified. Discharge to home. Patient agrees, all questions answered to the best my ability. Consults: Infectious Disease Dr. Patience Farooq    Significant Diagnostic Studies: labs:   Recent Results (from the past 24 hour(s))   METABOLIC PANEL, COMPREHENSIVE    Collection Time: 03/23/22  2:56 AM   Result Value Ref Range    Sodium 132 (L) 136 - 145 mmol/L    Potassium 3.8 3.5 - 5.5 mmol/L    Chloride 96 (L) 100 - 111 mmol/L    CO2 29 21 - 32 mmol/L    Anion gap 7 3.0 - 18 mmol/L    Glucose 94 74 - 99 mg/dL    BUN 16 7.0 - 18 MG/DL    Creatinine 4.12 (H) 0.6 - 1.3 MG/DL    BUN/Creatinine ratio 4 (L) 12 - 20      GFR est AA 13 (L) >60 ml/min/1.73m2    GFR est non-AA 11 (L) >60 ml/min/1.73m2    Calcium 9.6 8.5 - 10.1 MG/DL    Bilirubin, total 0.4 0.2 - 1.0 MG/DL    ALT (SGPT) 19 13 - 56 U/L    AST (SGOT) 23 10 - 38 U/L    Alk. phosphatase 119 (H) 45 - 117 U/L    Protein, total 8.6 (H) 6.4 - 8.2 g/dL    Albumin 3.6 3.4 - 5.0 g/dL    Globulin 5.0 (H) 2.0 - 4.0 g/dL    A-G Ratio 0.7 (L) 0.8 - 1.7     CBC WITH AUTOMATED DIFF    Collection Time: 03/23/22  2:56 AM   Result Value Ref Range    WBC 4.2 (L) 4.6 - 13.2 K/uL    RBC 2.72 (L) 4.20 - 5.30 M/uL    HGB 8.2 (L) 12.0 - 16.0 g/dL    HCT 26.4 (L) 35.0 - 45.0 %    MCV 97.1 78.0 - 100.0 FL    MCH 30.1 24.0 - 34.0 PG    MCHC 31.1 31.0 - 37.0 g/dL    RDW 16.0 (H) 11.6 - 14.5 %    PLATELET 488 289 - 932 K/uL    MPV 9.9 9.2 - 11.8 FL    NRBC 0.0 0  WBC    ABSOLUTE NRBC 0.00 0.00 - 0.01 K/uL    NEUTROPHILS 68 40 - 73 %    LYMPHOCYTES 14 (L) 21 - 52 %    MONOCYTES 12 (H) 3 - 10 %    EOSINOPHILS 5 0 - 5 %    BASOPHILS 1 0 - 2 %    IMMATURE GRANULOCYTES 1 (H) 0.0 - 0.5 %    ABS. NEUTROPHILS 2.8 1.8 - 8.0 K/UL    ABS. LYMPHOCYTES 0.6 (L) 0.9 - 3.6 K/UL    ABS. MONOCYTES 0.5 0.05 - 1.2 K/UL    ABS. EOSINOPHILS 0.2 0.0 - 0.4 K/UL    ABS. BASOPHILS 0.0 0.0 - 0.1 K/UL    ABS. IMM.  GRANS. 0.0 0.00 - 0.04 K/UL    DF AUTOMATED     PHOSPHORUS    Collection Time: 03/23/22  2:56 AM   Result Value Ref Range    Phosphorus 5.1 (H) 2.5 - 4.9 MG/DL     IMAGING  XR Results (most recent):  Results from Hospital Encounter encounter on 03/21/22    XR CHEST PORT    Narrative  EXAM: Chest X-Ray    INDICATION:  SOB. TECHNIQUE: AP view of the chest    COMPARISON: 3/14/2022, 2/13/2022, 10/24/2021    FINDINGS:  Tubes and Lines: A hemodialysis catheter is present. This is unchanged. Pleura: No pneumothorax appreciated. No effusions appreciated. Lungs:  Extensive bilateral interstitial opacities are noted. Cardiac/Mediastinum/Aorta: Cardiac silhouette is enlarged. Pulmonary Vascularity: The pulmonary vasculature is prominent. Chest Wall: No acute finding    Osseous Structures: Unremarkable    Upper Abdomen: No acute findings appreciated. Impression  1. Moderate pulmonary edema. 2.  Hemodialysis catheter without evidence of complication. Discharge Medications:     Current Discharge Medication List      CONTINUE these medications which have CHANGED    Details   atovaquone (MEPRON) 750 mg/5 mL suspension Take 10 mL by mouth daily (with breakfast) for 30 days. Qty: 300 mL, Refills: 0         CONTINUE these medications which have NOT CHANGED    Details   nllhhwzlj-yfufekxr-khbhdme ala (BIKTARVY) tab tablet Take 1 Tablet by mouth daily. QUEtiapine (SEROquel) 25 mg tablet Take 25 mg by mouth nightly. calcium acetate,phosphat bind, (PHOSLO) 667 mg cap Take 2 Capsules by mouth three (3) times daily (with meals). amLODIPine (NORVASC) 10 mg tablet Take 1 Tablet by mouth daily. Qty: 30 Tablet, Refills: 0      azithromycin (ZITHROMAX) 600 mg tablet Take 2 Tablets by mouth every seven (7) days. Qty: 30 Tablet, Refills: 0      carvediloL (COREG) 6.25 mg tablet Take 1 Tablet by mouth two (2) times daily (with meals).   Qty: 60 Tablet, Refills: 0      losartan (COZAAR) 100 mg tablet Take 1 Tablet by mouth daily.  Qty: 30 Tablet, Refills: 0         STOP taking these medications       trimethoprim-sulfamethoxazole (BACTRIM DS, SEPTRA DS) 160-800 mg per tablet Comments:   Reason for Stopping:               Activity: PT/OT per Home Health    Diet: Renal Diet    Wound Care: None needed    Follow-up:   Follow-up Appointments   Procedures    FOLLOW UP VISIT Appointment in: Other (Specify) 1. Dr. Frieda Moore 2 - 3 days. 2. Dr. Celnia Curtis 1 - 2 weeks. Dx: AIDS. 1. Dr. Frieda Moore 2 - 3 days. 2. Dr. Celina Curtis 1 - 2 weeks. Dx: AIDS. Standing Status:   Standing     Number of Occurrences:   1     Order Specific Question:   Appointment in     Answer:    Other (Specify)     Minutes spent on discharge: >30

## 2022-03-23 NOTE — PROGRESS NOTES
Progress Note    Kacie Bhakta  63 y.o. Admit Date: 3/21/2022  Principal Problem:    Respiratory failure, acute ([facility] (3/22/2022) POA: Yes    Active Problems:    ESRD (end stage renal disease) on dialysis ([facility] [address] (10/25/2021) POA: Yes      AIDS (acquired immune deficiency syndrome) ([facility] (11/8/2021) POA: Yes      Pulmonary edema (3/22/2022) POA: Yes      Hypertensive urgency (3/22/2022) POA: Yes            Subjective:     Patient Seen earlier on the floor ,says breathing much better after last night's acute dialysis. Complains of muscle pain,soarness. looks quite wasted        A comprehensive review of systems was negative except for that written in the History of Present Illness.     Objective:     Visit Vitals  BP (!) 121/95   Pulse (!) 112   Temp 97.7 °F (36.5 °C)   Resp 20   Ht 5' 1\" (1.549 m)   Wt 51.3 kg (113 lb)   SpO2 97%   BMI 21.35 kg/m²         Intake/Output Summary (Last 24 hours) at 3/22/2022 2058  Last data filed at 3/22/2022 1225  Gross per 24 hour   Intake 241.4 ml   Output 4073 ml   Net -3831.6 ml       Current Facility-Administered Medications   Medication Dose Route Frequency Provider Last Rate Last Admin    heparin (porcine) 1,000 unit/mL injection 5,000 Units  5,000 Units IntraCATHeter DIALYSIS PRN Christal yTson MD   5,000 Units at 03/22/22 1915    sodium chloride (NS) flush 5-40 mL  5-40 mL IntraVENous Q8H Pollo Patel, DO   10 mL at 03/22/22 1328    sodium chloride (NS) flush 5-40 mL  5-40 mL IntraVENous PRN Valerie Grajeda DO        acetaminophen (TYLENOL) tablet 650 mg  650 mg Oral Q6H PRN Pam MALDONADO, DO   650 mg at 03/22/22 1122    Or    acetaminophen (TYLENOL) suppository 650 mg  650 mg Rectal Q6H PRN Pollo Patel DO        polyethylene glycol (MIRALAX) packet 17 g  17 g Oral DAILY PRN Pamanuj MALDONADO DO   17 g at 03/22/22 1346    ondansetron (ZOFRAN ODT) tablet 4 mg  4 mg Oral Q8H PRN Pollo Patel DO        Or    ondansetron (ZOFRAN) injection 4 mg  4 mg IntraVENous Q6H PRN Houston Lites, DO        amLODIPine (NORVASC) tablet 10 mg  10 mg Oral DAILY Pollo Patel DO   10 mg at 03/22/22 1029    carvediloL (COREG) tablet 6.25 mg  6.25 mg Oral BID WITH MEALS Pollo Patel DO   6.25 mg at 03/22/22 1029    losartan (COZAAR) tablet 100 mg  100 mg Oral DAILY Pollo Patel DO   100 mg at 03/22/22 1029    azithromycin (ZITHROMAX) tablet 1,200 mg  1,200 mg Oral Q7D Pollo Patel DO   1,200 mg at 03/22/22 1037    atovaquone (MEPRON) 750 mg/5 mL oral suspension 750 mg  750 mg Oral DAILY WITH BREAKFAST Pollo Patel DO   750 mg at 03/22/22 1029    nitroglycerin (NITROBID) 2 % ointment 1 Inch  1 Inch Topical Q6H PRN [facility], [facility]        hydrALAZINE (APRESOLINE) 20 mg/mL injection 20 mg  20 mg IntraVENous Q6H PRN Jorge Elma Maxine, DO        HYDROcodone-acetaminophen (NORCO) 5-325 mg per tablet 1-2 Tablet  1-2 Tablet Oral Q6H PRN Lucia Bear MD   2 Tablet at 03/22/22 1757        Physical Exam:     Physical Exam:   General:  Alert, cooperative, no distress, appears stated age. Neck: Supple, symmetrical, trachea midline, no adenopathy, thyroid: no enlargement/tenderness/nodules, no carotid bruit and no JVD. Lungs:   Clear to auscultation bilaterally. Heart:  Regular rate and rhythm, S1, S2 normal, no murmur, click, rub or gallop. Abdomen:   Soft, non-tender. Bowel sounds normal. No masses,  No organomegaly. Extremities: Extremities normal, atraumatic, no cyanosis or edema. HD catheter is well dressed,AVF not ready to use.    Skin: Skin color, texture, turgor normal. No rashes or lesions         Data Review:    CBC w/Diff    Recent Labs     03/21/22 2125   WBC 7.2   RBC 2.84*   HGB 8.8*   HCT 28.8*   .4*   MCH 31.0   MCHC 30.6*   RDW 16.3*    Recent Labs     03/21/22 2125   MONOS 13*   EOS 7*   BASOS 1   RDW 16.3*        Comprehensive Metabolic Profile    Recent Labs     03/21/22 2125      K 3.9      CO2 17*   BUN 58* CREA 9.68*    Recent Labs     03/21/22 2125   CA 8.3*   ALB 3.9   TP 8.9*   TBILI 0.4        Narrative & Impression   EXAM: Chest X-Ray     INDICATION:  SOB.     TECHNIQUE: AP view of the chest     COMPARISON: 3/14/2022, 2/13/2022, 10/24/2021     FINDINGS:   Tubes and Lines: A hemodialysis catheter is present. This is unchanged.     Pleura: No pneumothorax appreciated. No effusions appreciated.       Lungs:  Extensive bilateral interstitial opacities are noted.     Cardiac/Mediastinum/Aorta: Cardiac silhouette is enlarged.     Pulmonary Vascularity: The pulmonary vasculature is prominent.     Chest Wall: No acute finding     Osseous Structures: Unremarkable     Upper Abdomen: No acute findings appreciated.     IMPRESSION  1. Moderate pulmonary edema.     2. Hemodialysis catheter without evidence of complication                     Impression:       Active Hospital Problems    Diagnosis Date Noted    Respiratory failure, acute ([address] 03/22/2022    Pulmonary edema 03/22/2022    Hypertensive urgency 03/22/2022    AIDS (acquired immune deficiency syndrome) ([address] 11/08/2021    ESRD (end stage renal disease) on dialysis ([address] 10/25/2021            Plan:     Dialyzed again & removed another 1500 cc. Continue Retacrit. ID's Input appreciated. If remain stable she can be discharged from Renal point & continue OP Dialysis.       David Dominguez MD

## 2022-03-23 NOTE — PROGRESS NOTES
Problem: Mobility Impaired (Adult and Pediatric)  Goal: *Acute Goals and Plan of Care (Insert Text)  Description: Physical Therapy Goals  Initiated 3/23/2022 and to be accomplished within 7 day(s)  1. Patient will move from supine to sit and sit to supine  in bed with independence. 2.  Patient will transfer from bed to chair and chair to bed with modified independence using the least restrictive device. 3.  Patient will perform sit to stand with modified independence. 4.  Patient will ambulate with supervision/set-up for 300 feet with the least restrictive device. 5.  Patient will ascend/descend 4 stairs with 2 handrail(s) with minimal assistance/contact guard assist.    PLOF: pt reports using a walker until she moved a few months ago when is was lost; several steps to enter home; lives with multiple family members so someone is with her at all times     Outcome: Progressing Towards Goal   PHYSICAL THERAPY EVALUATION    Patient: Maxime Hall (14 y.o. female)  Date: 3/23/2022  Primary Diagnosis: Respiratory failure, acute ([facility] [J96.00]  Hypoxia [R09.02]  Pulmonary edema [J81.1]        Precautions: fall, decreased safety awareness       PLOF: see above    ASSESSMENT :  Based on the objective data described below, the patient presents with decreased strength, dynamic standing balance and activity tolerance resulting in decreased independence with functional mobility. Pt required CGA when ambulating with the RW due to path deviations, increased trunk sway and decreased step clearance. Pt was mod I with bed mobility and SBA for standing transfers. After evaluation patient was left in bed with needs in reach. Patient will benefit from skilled intervention to address the above impairments.   Patient's rehabilitation potential is considered to be Fair  Factors which may influence rehabilitation potential include:   []         None noted  []         Mental ability/status  [x]         Medical condition  [] Home/family situation and support systems  [x]         Safety awareness  []         Pain tolerance/management  []         Other:      PLAN :  Recommendations and Planned Interventions:   [x]           Bed Mobility Training             []    Neuromuscular Re-Education  [x]           Transfer Training                   []    Orthotic/Prosthetic Training  [x]           Gait Training                          []    Modalities  [x]           Therapeutic Exercises           []    Edema Management/Control  [x]           Therapeutic Activities            []    Family Training/Education  [x]           Patient Education  []           Other (comment):    Frequency/Duration: Patient will be followed by physical therapy 1-2 times per day/4-7 days per week to address goals. Discharge Recommendations: Home Physical Therapy with 24 hour assistance  Further Equipment Recommendations for Discharge: rolling walker     SUBJECTIVE:   Patient stated I'm a little wobbly.     OBJECTIVE DATA SUMMARY:     Past Medical History:   Diagnosis Date    Acute kidney injury ([facility]     AIDS (acquired immune deficiency syndrome) ([facility]     Anemia     Esophageal candidiasis (HCC)     ESRD (end stage renal disease) (HCC)     HCV (hepatitis C virus)     Hepatitis B     Hepatitis C     HIV (human immunodeficiency virus infection) ([facility]     HIV (human immunodeficiency virus infection) ([facility]     Late latent syphilis     Pulmonary TB     Schizoaffective disorder ([facility]     Substance abuse ([facility]      Past Surgical History:   Procedure Laterality Date    HX GYN      HX NEPHRECTOMY      left kidney removed at age 15    HX NEPHRECTOMY       Barriers to Learning/Limitations: yes;  altered mental status (i.e.Sedation, Confusion)  Compensate with: Visual Cues and Verbal Cues  Home Situation:  Home Situation  Home Environment: Private residence  # Steps to Enter: 3  One/Two Story Residence: Two story  # of Interior Steps: 17  Living Alone: No  Support Systems: Child(joanne)  Patient Expects to be Discharged to[de-identified] Home  Current DME Used/Available at Home: [place] Behavior:  Neurologic State: Alert  Orientation Level: Oriented to person;Oriented to place;Oriented to situation;Disoriented to time  Cognition: Follows commands     Strength:    Strength: Generally decreased, functional      Tone & Sensation:   Tone: Normal       Range Of Motion:  AROM: Within functional limits      Functional Mobility:  Bed Mobility:  Supine to Sit: Modified independent  Sit to Supine: Modified independent     Transfers:  Sit to Stand: Stand-by assistance  Stand to Sit: Stand-by assistance     Balance:   Sitting - Static: Good (unsupported)  Standing: With support  Standing - Static:  (fair+)  Standing - Dynamic :  (fair/fair-)  Ambulation/Gait Training:  Distance (ft): 120 Feet (ft)  Assistive Device: Walker, rolling  Ambulation - Level of Assistance: Contact guard assistance  Gait Abnormalities: Decreased step clearance    Pain:  Pain level pre-treatment: 2/10   Pain level post-treatment: 2/10   Pain Intervention(s) :  Rest, Repositioning  Response to intervention: Nurse notified, See doc flow    Activity Tolerance:   Fair-/poor+  Please refer to the flowsheet for vital signs taken during this treatment. After treatment:   []         Patient left in no apparent distress sitting up in chair  [x]         Patient left in no apparent distress in bed  [x]         Call bell left within reach  []         Nursing notified  []         Caregiver present  []         Bed alarm activated  []         SCDs applied    COMMUNICATION/EDUCATION:   [x]         Role of Physical Therapy in the acute care setting. [x]         Fall prevention education was provided and the patient/caregiver indicated understanding. [x]         Patient/family have participated as able in goal setting and plan of care. [x]         Patient/family agree to work toward stated goals and plan of care.   []         Patient understands intent and goals of therapy, but is neutral about his/her participation. []         Patient is unable to participate in goal setting/plan of care: ongoing with therapy staff.  []         Other:     Thank you for this referral.  Claretha Sandifer, PT   Time Calculation: 15 mins      Eval Complexity: History: HIGH Complexity :3+ comorbidities / personal factors will impact the outcome/ POC Exam:MEDIUM Complexity : 3 Standardized tests and measures addressing body structure, function, activity limitation and / or participation in recreation  Presentation: LOW Complexity : Stable, uncomplicated  Clinical Decision Making:Low Complexity    Overall Complexity:LOW

## 2022-03-23 NOTE — PROGRESS NOTES
Discharge order noted for today. Pt  Is active with [address]  agency. Met with patient and spoke with yunier Ponce via phone. and are agreeable to the transition plan today. Transport has been arranged with daughter. Patient's  home health  orders have been forwarded to [address] [place] [address] health  agency via [employer]. Updated bedside RN, Naomi,  to the transition plan. Discharge information has been documented on the AVS.       Reason for Admission:  Respiratory failure, acute ([facility] [J96.00]  Hypoxia [R09.02]  Pulmonary edema [J81.1]                 RUR Score:  20%     Plan for utilizing home health: Active with [facility]                  Likelihood of Readmission:   Moderate                         Do you (patient/family) have any concerns for transition/discharge?  no    Transition of Care Plan:       Initial assessment completed with yunier Ponce via phone per patient's request. Cognitive status of patient: oriented to place and person. Patient stated \" I'm not feeling well. Call my daughter. \"     Face sheet information confirmed:  yes. The patient designates yunier Ponce ([phone]) to participate in her discharge plan and to receive any needed information. This patient lives in a single family home with daughter. Patient was able to navigate steps as needed. Prior to hospitalization, patient was considered to be independent with ADLs/IADLS : no . If not independent,  patient needs assist with : dressing, bathing, food preparation, cooking, toileting and grooming. Daughter is caregiver. Patient has a current ACP document on file: no    The patient's  daughter will be available to transport patient home upon discharge. The patient already has Zack Kuo, W/C, and shower chair medical equipment available in the home. Patient is currently active with home health. If active, agency name is [facility].      Patient has not stayed in a skilled nursing facility or rehab. f      This patient is on dialysis :yes    If yes, hemodialysis patient and receives treatment on Tuesday/Thursday/Saturday at [address] time is 10:45 am. Pt is transported to/from dialysis by [facility] transportation. Freedom of choice signed: yes., for [facility]. Currently, the discharge plan is Home with [address] [place] [address] Logan Dukes. The patient states that she can obtain her medications from the pharmacy, and take her medications as directed.     Patient's current insurance is [employer] [address] Management Interventions  Mode of Transport at Discharge: [facility]  Transition of Care Consult (CM Consult): Discharge [address]: No  Reason Outside [place]: Patient already serviced by other home care/hospice agency  Discharge Durable Medical Equipment: No  Physical Therapy Consult: Yes  Occupational Therapy Consult: Yes  Speech Therapy Consult: No  Support Systems: Child(joanne)  Confirm Follow Up Transport: Family  The Plan for Transition of Care is Related to the Following Treatment Goals : home health  The Patient and/or Patient Representative was Provided with a Choice of Provider and Agrees with the Discharge Plan?: Yes  Freedom of Choice List was Provided with Basic Dialogue that Supports the Patient's Individualized Plan of Care/Goals, Treatment Preferences and Shares the Quality Data Associated with the Providers?: Yes  Discharge Location  Patient Expects to be Discharged to[de-identified] Home with home health        URBAN Hull, RN  Pager # [phone]  Care Manager

## 2022-03-23 NOTE — CONSULTS
Comprehensive Nutrition Assessment    Type and Reason for Visit: Initial,Consult    Nutrition Recommendations/Plan:   - Modify diet to 3 carb choice, 2 gm Na, low phosphorus diet. Discontinue low fat/low chol/high fiber, minced and moist and low potassium restrictions. - Add oral supplement to optimize nutrition intake: Nepro BID  - Add renal MVI    Nutrition Assessment:  Pt c/o being hungry and asking for more meals. PT with missing teeth on a modified consistency diet with no documented dysphagia. Pt reports eating solid foods without difficulty. Obtained telephone order to change diet consistency to regular. Pt agreeable to Nepro supplementation. Malnutrition Assessment:  Malnutrition Status:  No malnutrition      Nutrition History and Allergies: Pertinent PMH: AIDS/HIV, anemia, esophageal candidiasis, ESRD on HD, schizoaffective disorder, substance abuse, nephrectomy. Presented with SOB. Reports good appetite and stable weights PTA; noted some weight gain per chart hx from 96 lb in October 2021. NKFA. Estimated Daily Nutrient Needs:  Energy (kcal): 6364-8035; Weight Used for Energy Requirements: Current  Protein (g): 51-71; Weight Used for Protein Requirements: Current (1-1.4)  Fluid (ml/day): 750-1500; Method Used for Fluid Requirements: Standard renal      Nutrition Related Findings:  BM: 3/21. Missing some top teeth. K 3.8, Phos 5.1. Wounds:    None       Current Nutrition Therapies:  ADULT DIET Dysphagia - Minced & Moist; 3 carb choices (45 gm/meal); Low Fat/Low Chol/High Fiber/2 gm Na; Low Sodium (2 gm); Low Potassium (Less than 3000 mg/day);  Low Phosphorus (Less than 1000 mg); 2000 ml    Anthropometric Measures:  · Height:  5' 1\" (154.9 cm)  · Current Body Wt:  51.3 kg (113 lb 1.5 oz)   · Admission Body Wt:  113 lb 1.5 oz    · Usual Body Wt:  49 kg (108 lb) (3/2021)     · Ideal Body Wt:  105 lbs:  107.7 %   · Adjusted Body Weight:   ; Weight Adjustment for: No adjustment   · Adjusted BMI:       · BMI Category:  Normal weight (BMI 18.5-24. 9)       Nutrition Diagnosis:   · Increased nutrient needs related to catabolic illness,increased demand for energy/nutrients,renal dysfunction as evidenced by dialysis,other (specify) (HIV/AIDS)      Nutrition Interventions:   Food and/or Nutrient Delivery: Modify current diet,Start oral nutrition supplement,Vitamin supplement  Nutrition Education and Counseling: No recommendations at this time,Education not indicated  Coordination of Nutrition Care: Continue to monitor while inpatient    Goals:  PO nutrition intake will meet >75% of patient estimated nutritional needs by next follow up date.        Nutrition Monitoring and Evaluation:   Behavioral-Environmental Outcomes: None identified  Food/Nutrient Intake Outcomes: Diet advancement/tolerance,Food and nutrient intake,Vitamin/mineral intake,Supplement intake  Physical Signs/Symptoms Outcomes: Biochemical data,Chewing or swallowing,Meal time behavior,Nutrition focused physical findings,Fluid status or edema    Discharge Planning:    Continue oral nutrition supplement,Continue current diet     Electronically signed by Kelly Mcknight RD on 3/23/2022 at 11:59 AM    Contact: [phone]

## 2022-03-23 NOTE — PROGRESS NOTES
Progress Note    Adri Pedroza  63 y.o. Admit Date: 3/21/2022  Principal Problem:    Res[facility] (3/22/2022) POA: Yes    Active Problems:    ESRD (end stage renal disease) on dialysis ([facility] (10/25/2021) POA: Yes      AIDS (acquired immune deficiency syndrome) ([facility] (11/8/2021) POA: Yes      Pulmonary edema (3/22/2022) POA: Yes      Hypertensive urgency (3/22/2022) POA: Yes            Subjective:     Patient was dialyzed 2 days in a row. Volume status is much better. Wants to eat and drink. Having intermittent confusion to her baseline. Patient brought to a close to the nursing station for quad precautions and more close observation. Has underlying HIV encephalopathy along with dementia. Infectious disease consultant also seen. Since patient is not taking her medications appropriately also has hepatitis B      A comprehensive review of systems was negative except for that written in the History of Present Illness.     Objective:     Visit Vitals  /82   Pulse 89   Temp 98.8 °F (37.1 °C)   Resp 20   Ht 5' 1\" (1.549 m)   Wt 51.3 kg (113 lb)   SpO2 97%   BMI 21.35 kg/m²         Intake/Output Summary (Last 24 hours) at 3/23/2022 1109  Last data filed at 3/22/2022 2651  Gross per 24 hour   Intake 240 ml   Output 1500 ml   Net -1260 ml       Current Facility-Administered Medications   Medication Dose Route Frequency Provider Last Rate Last Admin    heparin (porcine) 1,000 unit/mL injection 5,000 Units  5,000 Units IntraCATHeter DIALYSIS PRN Annmarie Quiles MD   5,000 Units at 03/22/22 1915    sodium chloride (NS) flush 5-40 mL  5-40 mL IntraVENous Q8H Pollo Patel DO   10 mL at 03/23/22 0523    sodium chloride (NS) flush 5-40 mL  5-40 mL IntraVENous PRN Toño Ge DO        acetaminophen (TYLENOL) tablet 650 mg  650 mg Oral Q6H PRN Rigo MALDONADO DO   650 mg at 03/22/22 1122    Or    acetaminophen (TYLENOL) suppository 650 mg  650 mg Rectal Q6H PRN Toño Ge DO  polyethylene glycol (MIRALAX) packet 17 g  17 g Oral DAILY PRN Rigo MALDONADO, DO   17 g at 03/22/22 1346    ondansetron (ZOFRAN ODT) tablet 4 mg  4 mg Oral Q8H PRN Toño Ge, DO        Or    ondansetron (ZOFRAN) injection 4 mg  4 mg IntraVENous Q6H PRN Pollo Patel DO        amLODIPine (NORVASC) tablet 10 mg  10 mg Oral DAILY Pollo Patel DO   10 mg at 03/23/22 1550    carvediloL (COREG) tablet 6.25 mg  6.25 mg Oral BID WITH MEALS Toñosusanne Ge, DO   6.25 mg at 03/23/22 1622    losartan (COZAAR) tablet 100 mg  100 mg Oral DAILY Toño Joo, DO   100 mg at 03/23/22 8566    azithromycin (ZITHROMAX) tablet 1,200 mg  1,200 mg Oral Q7D Pollo Patel DO   1,200 mg at 03/22/22 1037    atovaquone (MEPRON) 750 mg/5 mL oral suspension 750 mg  750 mg Oral DAILY WITH BREAKFAST Pollo Patel DO   750 mg at 03/23/22 8693    nitroglycerin (NITROBID) 2 % ointment 1 Inch  1 Inch Topical Q6H PRN Toñosusanne Ge DO        hydrALAZINE (APRESOLINE) 20 mg/mL injection 20 mg  20 mg IntraVENous Q6H PRN Ricco Patel Haloservando,         HYDROcodone-acetaminophen (NORCO) 5-325 mg per tablet 1-2 Tablet  1-2 Tablet Oral Q6H PRN Stuart Reid MD   2 Tablet at 03/23/22 6192        Physical Exam:     Physical Exam:   General:  Alert, cooperative, no distress, appears stated age. Neck: Supple, symmetrical, trachea midline, no adenopathy, thyroid: no enlargement/tenderness/nodules, no carotid bruit and no JVD. Lungs:   Clear to auscultation bilaterally. Heart:  Regular rate and rhythm, S1, S2 normal, no murmur, click, rub or gallop. Abdomen:   Soft, non-tender. Bowel sounds normal. No masses,  No organomegaly. Extremities: Extremities normal, atraumatic, no cyanosis or edema, hemodialysis catheter is well dressed. AV fistula on the left arm has good thrill and bruit but not used.    Skin: Skin color, texture, turgor normal. No rashes or lesions         Data Review:    CBC w/Diff    Recent Labs 03/23/22 0256 03/21/22 2125 03/21/22 2125   WBC 4.2*  --  7.2   RBC 2.72*  --  2.84*   HGB 8.2*  --  8.8*   HCT 26.4*  --  28.8*   MCV 97.1   < > 101.4*   MCH 30.1   < > 31.0   MCHC 31.1   < > 30.6*   RDW 16.0*   < > 16.3*    < > = values in this interval not displayed. Recent Labs     03/23/22 0256 03/21/22 2125 03/21/22 2125   MONOS 12*  --  13*   EOS 5  --  7*   BASOS 1   < > 1   RDW 16.0*   < > 16.3*    < > = values in this interval not displayed. Comprehensive Metabolic Profile    Recent Labs     03/23/22 0256 03/21/22 2125   * 137   K 3.8 3.9   CL 96* 106   CO2 29 17*   BUN 16 58*   CREA 4.12* 9.68*    Recent Labs     03/23/22 0256 03/21/22 2125   CA 9.6 8.3*   PHOS 5.1*  --    ALB 3.6 3.9   TP 8.6* 8.9*   TBILI 0.4 0.4                      Impression:       Active Hospital Problems    Diagnosis Date Noted    Respiratory failure, acute ([facility] 03/22/2022    Pulmonary edema 03/22/2022    Hypertensive urgency 03/22/2022    AIDS (acquired immune deficiency syndrome) ([facility] 11/08/2021    ESRD (end stage renal disease) on dialysis ([facility] 10/25/2021            Plan:     Renal wise patient is stable enough to be discharged today and continue dialysis as an outpatient. For her HIV and AIDS related problem she should follow with Dr. Jeanette Moon in behaviors for ongoing treatment.   Continue fluid restriction of 1200 cc per 24 hours      Tru Barber MD

## 2022-03-23 NOTE — PROGRESS NOTES
Discharge teaching completed via phone with [facility] Tan(daughter). Opportunity provided for clarifying questions. All answered to her satisfaction. IV removed, EKG leads removed, ID removed.

## 2022-03-23 NOTE — PROGRESS NOTES
[facility]  (A Division of [address] [place] [address])    Follow-up Note      Date of Admission: 3/21/2022       Date of Note:  3/23/2022          Current Antimicrobials:    Prior Antimicrobials:        Assessment:         Acute hypoxic respiratory failure: Initially requiring BiPAP. Most likely related to volume overload and hypertensive emergency chest x-ray with pulmonary edema. Cannot entirely rule out underlying PJP pneumonia  HIV/AIDS: On tenofovir, Tivicay, Prezista/ritonavir, 3TC per documentation of prior to admission meds.; based on review of viral loads over the last 2 years it appears as though she become less compliant with her antiretroviral regimen. She is not sure who follows her locally for HIV but had moved from [place], [place] about 1 year ago. Previously on Biktarvy (bictegravir-emtricitibine-TNF)              -1/20/2022 HIV viral load 108,000, absolute CD4 16              - 2/14/22 HIV ,000 CD4 14              - 9/28/2020 genotype reviewed-no predicted integrates resistance  Chronic active hepatitis B: Last HBV PCR was 526 on 1/20/2022; hepatitis B E antigen negative on 3/29/2021              - 2/14 HBV   End-stage renal disease on hemodialysis  History of syphilis: Last known RPR titer 1.8 about 1 years ago  Reported history of latent TB- quantiferron gold from 9/28/2020 negative  Hx substance abuse:  Prior documentation of cocaine use    Plan:   Atovaquone in setting of end-stage renal disease and need for PJP prophylaxis  Azithromycin for MAC prophylaxis given last known CD4 16  Biktarvy daily. Check liver US in near future to eval for chronic persistent Hep C     Can schedule as OP in my clinic to follow for HIV management              - again discussed with her at length that she needs to be complaint with HAART and that she will eventually continue to decline if she does not take her HAART.  Strongly advised her to keep f/u OP HIV appointment.               - given chronic active Hep B, very concerning that she is not complaint with her ARV's as this can procress quickly to liver failure and malignancy. Discussed with Dr. Deven Maldonado this am    Tracy Kay, [address] [facility]  [address]  Office: [phone]  Mobile/Text: [phone]     Microbiology:  none    Lines / Catheters:  Peripheral  HD cath    Subjective:   Patient seen and examined in HD this am.  Still doing about the same. Denies pain. Still not able to tell me what medications she is on. Objective:        Visit Vitals  /82   Pulse 89   Temp 98.8 °F (37.1 °C)   Resp 20   Ht 5' 1\" (1.549 m)   Wt 51.3 kg (113 lb)   SpO2 97%   BMI 21.35 kg/m²     Temp (24hrs), Av.8 °F (36.6 °C), Min:97.2 °F (36.2 °C), Max:98.8 °F (37.1 °C)        General:   awake alert and oriented, non-toxic   Skin:   no rashes or skin lesions noted on limited exam, dry and warm   HEENT:  No scleral icterus or pallor; oral mucosa moist, lips moist   Lymph Nodes:   not assessed today   Lungs:   non, labored; bilaterally clear to aspiration- no crackles wheezes rales or rhonchi   Heart:  RRR, s1 and s2; no murmurs rubs or gallops; no edema, + pedal pulses   Abdomen:  soft, non-distended, active bowel sounds, non-tender   Genitourinary:  deferred   Extremities:   average muscle tone; no contractures, no joint effusions   Neurologic:  No gross focal motor or sensory abnormalities; CN 2-12 intact; Follows commands. Psychiatric:   appropriate and interactive.          Lab results:    Chemistry  Recent Labs     22  0256 22  2125   GLU 94 186*   * 137   K 3.8 3.9   CL 96* 106   CO2 29 17*   BUN 16 58*   CREA 4.12* 9.68*   CA 9.6 8.3*   AGAP 7 14   BUCR 4* 6*   * 138*   TP 8.6* 8.9*   ALB 3.6 3.9   GLOB 5.0* 5.0*   AGRAT 0.7* 0.8       CBC w/ Diff  Recent Labs     22  0256 22  2125   WBC 4.2* 7.2   RBC 2.72* 2.84*   HGB 8.2* 8.8*   HCT 26.4* 28.8*    353   GRANS 68 40   LYMPH 14* 39   EOS 5 7*       Microbiology  All Micro Results     None           Ernie Edgar DO   3/23/2022

## 2022-03-28 ENCOUNTER — HOSPITAL ENCOUNTER (EMERGENCY)
Age: 62
Discharge: HOME | End: 2022-03-29
Attending: EMERGENCY MEDICINE
Payer: MEDICAID

## 2022-03-28 ENCOUNTER — APPOINTMENT (OUTPATIENT)
Dept: GENERAL RADIOLOGY | Age: 62
End: 2022-03-28
Attending: EMERGENCY MEDICINE
Payer: MEDICAID

## 2022-03-28 DIAGNOSIS — Z99.2 ESRD ON DIALYSIS (HCC): ICD-10-CM

## 2022-03-28 DIAGNOSIS — J96.21 ACUTE ON CHRONIC RESPIRATORY FAILURE WITH HYPOXIA (HCC): Primary | ICD-10-CM

## 2022-03-28 DIAGNOSIS — N18.6 ESRD ON DIALYSIS (HCC): ICD-10-CM

## 2022-03-28 DIAGNOSIS — M25.561 ARTHRALGIA OF BOTH LOWER LEGS: ICD-10-CM

## 2022-03-28 DIAGNOSIS — J81.0 ACUTE PULMONARY EDEMA (HCC): ICD-10-CM

## 2022-03-28 DIAGNOSIS — M25.562 ARTHRALGIA OF BOTH LOWER LEGS: ICD-10-CM

## 2022-03-28 LAB
ALBUMIN SERPL-MCNC: 3.8 G/DL (ref 3.4–5)
ALBUMIN/GLOB SERPL: 0.7 {RATIO} (ref 0.8–1.7)
ALP SERPL-CCNC: 149 U/L (ref 45–117)
ALT SERPL-CCNC: 20 U/L (ref 13–56)
ANION GAP BLD CALC-SCNC: 6 MMOL/L (ref 10–20)
ANION GAP SERPL CALC-SCNC: 10 MMOL/L (ref 3–18)
ARTERIAL PATENCY WRIST A: POSITIVE
ARTERIAL PATENCY WRIST A: POSITIVE
AST SERPL-CCNC: 26 U/L (ref 10–38)
BASE DEFICIT BLD-SCNC: 11 MMOL/L
BASE DEFICIT BLD-SCNC: 7.1 MMOL/L
BASE DEFICIT BLD-SCNC: 9.1 MMOL/L
BASOPHILS # BLD: 0.1 K/UL (ref 0–0.1)
BASOPHILS NFR BLD: 1 % (ref 0–2)
BDY SITE: ABNORMAL
BDY SITE: ABNORMAL
BILIRUB SERPL-MCNC: 0.3 MG/DL (ref 0.2–1)
BNP SERPL-MCNC: ABNORMAL PG/ML (ref 0–900)
BUN SERPL-MCNC: 77 MG/DL (ref 7–18)
BUN/CREAT SERPL: 8 (ref 12–20)
CA-I BLD-MCNC: 1 MMOL/L (ref 1.12–1.32)
CALCIUM SERPL-MCNC: 8.7 MG/DL (ref 8.5–10.1)
CHLORIDE BLD-SCNC: 113 MMOL/L (ref 98–107)
CHLORIDE SERPL-SCNC: 105 MMOL/L (ref 100–111)
CO2 BLD-SCNC: 18 MMOL/L (ref 19–24)
CO2 SERPL-SCNC: 22 MMOL/L (ref 21–32)
CREAT BLD-MCNC: 10.77 MG/DL (ref 0.6–1.3)
CREAT SERPL-MCNC: 9.28 MG/DL (ref 0.6–1.3)
DIFFERENTIAL METHOD BLD: ABNORMAL
EOSINOPHIL # BLD: 0.5 K/UL (ref 0–0.4)
EOSINOPHIL NFR BLD: 4 % (ref 0–5)
ERYTHROCYTE [DISTWIDTH] IN BLOOD BY AUTOMATED COUNT: 15.9 % (ref 11.6–14.5)
FIO2 ON VENT: 100 %
GAS FLOW.O2 O2 DELIVERY SYS: ABNORMAL L/MIN
GAS FLOW.O2 SETTING OXYMISER: 15 BPM
GLOBULIN SER CALC-MCNC: 5.1 G/DL (ref 2–4)
GLUCOSE BLD STRIP.AUTO-MCNC: 108 MG/DL (ref 70–110)
GLUCOSE BLD-MCNC: 194 MG/DL (ref 65–100)
GLUCOSE SERPL-MCNC: 139 MG/DL (ref 74–99)
HCO3 BLD-SCNC: 17 MMOL/L (ref 22–26)
HCO3 BLD-SCNC: 19.3 MMOL/L (ref 22–26)
HCO3 BLD-SCNC: 19.9 MMOL/L (ref 22–26)
HCT VFR BLD AUTO: 28.9 % (ref 35–45)
HGB BLD-MCNC: 8.8 G/DL (ref 12–16)
IMM GRANULOCYTES # BLD AUTO: 0.2 K/UL (ref 0–0.04)
IMM GRANULOCYTES NFR BLD AUTO: 1 % (ref 0–0.5)
LACTATE BLD-SCNC: 1.83 MMOL/L (ref 0.4–2)
LACTATE BLD-SCNC: 4.16 MMOL/L (ref 0.4–2)
LYMPHOCYTES # BLD: 2 K/UL (ref 0.9–3.6)
LYMPHOCYTES NFR BLD: 16 % (ref 21–52)
MAGNESIUM SERPL-MCNC: 2 MG/DL (ref 1.6–2.6)
MCH RBC QN AUTO: 31.3 PG (ref 24–34)
MCHC RBC AUTO-ENTMCNC: 30.4 G/DL (ref 31–37)
MCV RBC AUTO: 102.8 FL (ref 78–100)
MONOCYTES # BLD: 0.9 K/UL (ref 0.05–1.2)
MONOCYTES NFR BLD: 8 % (ref 3–10)
NEUTS SEG # BLD: 8.6 K/UL (ref 1.8–8)
NEUTS SEG NFR BLD: 70 % (ref 40–73)
NRBC # BLD: 0 K/UL (ref 0–0.01)
NRBC BLD-RTO: 0 PER 100 WBC
O2/TOTAL GAS SETTING VFR VENT: 100 %
PCO2 BLD: 45.5 MMHG (ref 35–45)
PCO2 BLD: 53.4 MMHG (ref 35–45)
PCO2 BLDV: 45.8 MMHG (ref 41–51)
PEEP RESPIRATORY: 10 CMH2O
PH BLD: 7.17 [PH] (ref 7.35–7.45)
PH BLD: 7.25 [PH] (ref 7.35–7.45)
PH BLDV: 7.18 [PH] (ref 7.32–7.42)
PIP ISTAT,IPIP: 20
PLATELET # BLD AUTO: 339 K/UL (ref 135–420)
PMV BLD AUTO: 9.5 FL (ref 9.2–11.8)
PO2 BLD: 39 MMHG (ref 80–100)
PO2 BLD: 447 MMHG (ref 80–100)
PO2 BLDV: 57 MMHG (ref 25–40)
POTASSIUM BLD-SCNC: 5.5 MMOL/L (ref 3.5–5.1)
POTASSIUM SERPL-SCNC: 5 MMOL/L (ref 3.5–5.5)
PROT SERPL-MCNC: 8.9 G/DL (ref 6.4–8.2)
RBC # BLD AUTO: 2.81 M/UL (ref 4.2–5.3)
SAO2 % BLD: 100 % (ref 92–97)
SAO2 % BLD: 58.2 % (ref 92–97)
SAO2 % BLD: 81 %
SERVICE CMNT-IMP: ABNORMAL
SODIUM BLD-SCNC: 136 MMOL/L (ref 136–145)
SODIUM SERPL-SCNC: 137 MMOL/L (ref 136–145)
SPECIMEN SITE: ABNORMAL
SPECIMEN TYPE: ABNORMAL
SPECIMEN TYPE: ABNORMAL
TROPONIN-HIGH SENSITIVITY: 18 NG/L (ref 0–54)
VENTILATION MODE VENT: ABNORMAL
VT SETTING VENT: 400 ML
WBC # BLD AUTO: 12.2 K/UL (ref 4.6–13.2)

## 2022-03-28 PROCEDURE — 96365 THER/PROPH/DIAG IV INF INIT: CPT

## 2022-03-28 PROCEDURE — 82962 GLUCOSE BLOOD TEST: CPT

## 2022-03-28 PROCEDURE — 74011250636 HC RX REV CODE- 250/636: Performed by: EMERGENCY MEDICINE

## 2022-03-28 PROCEDURE — 74011000250 HC RX REV CODE- 250: Performed by: EMERGENCY MEDICINE

## 2022-03-28 PROCEDURE — 96366 THER/PROPH/DIAG IV INF ADDON: CPT

## 2022-03-28 PROCEDURE — 71045 X-RAY EXAM CHEST 1 VIEW: CPT

## 2022-03-28 PROCEDURE — 82947 ASSAY GLUCOSE BLOOD QUANT: CPT

## 2022-03-28 PROCEDURE — 99285 EMERGENCY DEPT VISIT HI MDM: CPT

## 2022-03-28 PROCEDURE — 96375 TX/PRO/DX INJ NEW DRUG ADDON: CPT

## 2022-03-28 PROCEDURE — 85025 COMPLETE CBC W/AUTO DIFF WBC: CPT

## 2022-03-28 PROCEDURE — 83735 ASSAY OF MAGNESIUM: CPT

## 2022-03-28 PROCEDURE — 83605 ASSAY OF LACTIC ACID: CPT

## 2022-03-28 PROCEDURE — 36600 WITHDRAWAL OF ARTERIAL BLOOD: CPT

## 2022-03-28 PROCEDURE — 82803 BLOOD GASES ANY COMBINATION: CPT

## 2022-03-28 PROCEDURE — 94660 CPAP INITIATION&MGMT: CPT

## 2022-03-28 PROCEDURE — 80053 COMPREHEN METABOLIC PANEL: CPT

## 2022-03-28 PROCEDURE — 83880 ASSAY OF NATRIURETIC PEPTIDE: CPT

## 2022-03-28 PROCEDURE — 84484 ASSAY OF TROPONIN QUANT: CPT

## 2022-03-28 PROCEDURE — 93005 ELECTROCARDIOGRAM TRACING: CPT

## 2022-03-28 RX ORDER — LORAZEPAM 2 MG/ML
INJECTION, SOLUTION INTRAMUSCULAR; INTRAVENOUS
Status: DISCONTINUED
Start: 2022-03-28 | End: 2022-03-28 | Stop reason: WASHOUT

## 2022-03-28 RX ORDER — FENTANYL CITRATE 50 UG/ML
50 INJECTION, SOLUTION INTRAMUSCULAR; INTRAVENOUS
Status: COMPLETED | OUTPATIENT
Start: 2022-03-28 | End: 2022-03-28

## 2022-03-28 RX ORDER — NITROGLYCERIN 40 MG/100ML
0-20 INJECTION INTRAVENOUS
Status: DISCONTINUED | OUTPATIENT
Start: 2022-03-28 | End: 2022-03-29 | Stop reason: HOSPADM

## 2022-03-28 RX ORDER — LORAZEPAM 2 MG/ML
1 INJECTION, SOLUTION INTRAMUSCULAR; INTRAVENOUS
Status: COMPLETED | OUTPATIENT
Start: 2022-03-28 | End: 2022-03-28

## 2022-03-28 RX ORDER — LORAZEPAM 2 MG/ML
1 INJECTION INTRAMUSCULAR
Status: DISCONTINUED | OUTPATIENT
Start: 2022-03-28 | End: 2022-03-28

## 2022-03-28 RX ORDER — FUROSEMIDE 10 MG/ML
80 INJECTION INTRAMUSCULAR; INTRAVENOUS
Status: COMPLETED | OUTPATIENT
Start: 2022-03-28 | End: 2022-03-28

## 2022-03-28 RX ADMIN — FENTANYL CITRATE 50 MCG: 50 INJECTION INTRAMUSCULAR; INTRAVENOUS at 21:52

## 2022-03-28 RX ADMIN — NITROGLYCERIN 10 MCG/MIN: 40 INJECTION INTRAVENOUS at 21:30

## 2022-03-28 RX ADMIN — FUROSEMIDE 80 MG: 10 INJECTION, SOLUTION INTRAMUSCULAR; INTRAVENOUS at 21:26

## 2022-03-28 RX ADMIN — LORAZEPAM 1 MG: 2 INJECTION, SOLUTION INTRAMUSCULAR; INTRAVENOUS at 22:15

## 2022-03-29 VITALS
BODY MASS INDEX: 21.71 KG/M2 | HEIGHT: 61 IN | WEIGHT: 115 LBS | RESPIRATION RATE: 18 BRPM | SYSTOLIC BLOOD PRESSURE: 153 MMHG | TEMPERATURE: 97 F | OXYGEN SATURATION: 100 % | DIASTOLIC BLOOD PRESSURE: 91 MMHG | HEART RATE: 93 BPM

## 2022-03-29 PROBLEM — F03.90 DEMENTIA (HCC): Status: ACTIVE | Noted: 2022-03-29

## 2022-03-29 LAB
ATRIAL RATE: 136 BPM
ATRIAL RATE: 87 BPM
CALCULATED P AXIS, ECG09: 53 DEGREES
CALCULATED P AXIS, ECG09: 56 DEGREES
CALCULATED R AXIS, ECG10: 12 DEGREES
CALCULATED R AXIS, ECG10: 15 DEGREES
CALCULATED T AXIS, ECG11: 81 DEGREES
CALCULATED T AXIS, ECG11: 92 DEGREES
DIAGNOSIS, 93000: NORMAL
DIAGNOSIS, 93000: NORMAL
P-R INTERVAL, ECG05: 114 MS
P-R INTERVAL, ECG05: 166 MS
Q-T INTERVAL, ECG07: 300 MS
Q-T INTERVAL, ECG07: 382 MS
QRS DURATION, ECG06: 62 MS
QRS DURATION, ECG06: 78 MS
QTC CALCULATION (BEZET), ECG08: 451 MS
QTC CALCULATION (BEZET), ECG08: 459 MS
TROPONIN-HIGH SENSITIVITY: 55 NG/L (ref 0–54)
TROPONIN-HIGH SENSITIVITY: 60 NG/L (ref 0–54)
VENTRICULAR RATE, ECG03: 136 BPM
VENTRICULAR RATE, ECG03: 87 BPM

## 2022-03-29 PROCEDURE — 93005 ELECTROCARDIOGRAM TRACING: CPT

## 2022-03-29 PROCEDURE — 94762 N-INVAS EAR/PLS OXIMTRY CONT: CPT

## 2022-03-29 PROCEDURE — 74011250636 HC RX REV CODE- 250/636

## 2022-03-29 PROCEDURE — 90935 HEMODIALYSIS ONE EVALUATION: CPT

## 2022-03-29 PROCEDURE — 84484 ASSAY OF TROPONIN QUANT: CPT

## 2022-03-29 PROCEDURE — 96376 TX/PRO/DX INJ SAME DRUG ADON: CPT

## 2022-03-29 PROCEDURE — 74011000258 HC RX REV CODE- 258: Performed by: EMERGENCY MEDICINE

## 2022-03-29 PROCEDURE — 77010033678 HC OXYGEN DAILY

## 2022-03-29 PROCEDURE — 74011250636 HC RX REV CODE- 250/636: Performed by: EMERGENCY MEDICINE

## 2022-03-29 PROCEDURE — 96367 TX/PROPH/DG ADDL SEQ IV INF: CPT

## 2022-03-29 PROCEDURE — 96366 THER/PROPH/DIAG IV INF ADDON: CPT

## 2022-03-29 PROCEDURE — 94660 CPAP INITIATION&MGMT: CPT

## 2022-03-29 RX ORDER — FENTANYL CITRATE 50 UG/ML
25 INJECTION, SOLUTION INTRAMUSCULAR; INTRAVENOUS
Status: COMPLETED | OUTPATIENT
Start: 2022-03-29 | End: 2022-03-29

## 2022-03-29 RX ORDER — FENTANYL CITRATE 50 UG/ML
INJECTION, SOLUTION INTRAMUSCULAR; INTRAVENOUS
Status: COMPLETED
Start: 2022-03-29 | End: 2022-03-29

## 2022-03-29 RX ORDER — FENTANYL CITRATE 50 UG/ML
50 INJECTION, SOLUTION INTRAMUSCULAR; INTRAVENOUS
Status: COMPLETED | OUTPATIENT
Start: 2022-03-29 | End: 2022-03-29

## 2022-03-29 RX ADMIN — FENTANYL CITRATE 25 MCG: 50 INJECTION, SOLUTION INTRAMUSCULAR; INTRAVENOUS at 03:54

## 2022-03-29 RX ADMIN — FENTANYL CITRATE 50 MCG: 50 INJECTION INTRAMUSCULAR; INTRAVENOUS at 10:40

## 2022-03-29 RX ADMIN — FENTANYL CITRATE 25 MCG: 50 INJECTION INTRAMUSCULAR; INTRAVENOUS at 03:54

## 2022-03-29 RX ADMIN — CALCIUM GLUCONATE 1 G: 98 INJECTION, SOLUTION INTRAVENOUS at 03:02

## 2022-03-29 NOTE — DIALYSIS
ACUTE HEMODIALYSIS TREATMENT    HEMODIALYSIS ORDERS: Physician: Dr. Ryne Gomes: [facility]   Duration: 3 hr   BFR: 350   DFR: 800   Dialysate:  Temp 36-37*C   K+  2    Ca+ 2.5   Na 138   Bicarb 35   Wt Readings from Last 1 Encounters:   03/21/22 51.3 kg (113 lb)    Patient Chart [x]   Unable to Obtain []  Dry weight/UF Goal: 3000 ml    Heparin []  Bolus    Units    [] Hourly    Units    [x]None       Pre BP:   198/112   Pulse:  121   Respirations: 25   Temp:  96.7  [] Oral [x] Axillary [] Esophageal   Labs: []  Pre  []  Post:   [x] N/A   Additional Orders(medications, blood products, hypotension management): [] Yes   [] No     [x]  [facility] Consent Verified     CATHETER ACCESS:  []N/A   [x]Right   []Left   []IJ   []Fem  [x]Chest wall  []TransHepatic   [] First use X-ray verified     [x]Tunnel    [] Non Tunneled   [x]No S/S infection  []Redness  []Drainage []Cultured []Swelling []Pain   [x]Medical Aseptic Prep Utilized   []Dressing Changed  [x] Biopatch  Date: none   []Clotted   [x]Patent   Flows: [x]Good  []Poor  []Reversed   If access problem,  notified: []Yes    [x]N/A          GENERAL ASSESSMENT:    LUNGS:  Resp Rate 25   [] Clear  [x] Coarse  [] Crackles  [] Wheezing  [] Diminished         Respirations:  []Easy  [x]Labored  []N/A  Cough:  []Productive  []Dry  []N/A      Therapy:  []RA  O2 Type:  []NC  Mask: []  NRB    [x] BiPaP  Flow:  l/min                   [] Ventilated  [] Intubated  [] Trach     CARDIAC: [] Regular      [] Irregular   [] Rhythm:          [x] Monitored   [x] Bedside   [x] Remotely monitored     EDEMA: [x] None   []Generalized  [] Pitting [] 1+   [] 2 +   [] 3+    [] 4+  [] Pedal    SKIN:   [x] Warm  [] Hot     [] Cold   [x] Dry     [] Pale   [] Diaphoretic                  [] Flushed  [] Jaundiced  [] Cyanotic     LOC:    [x] Alert      [x]Oriented:    [x] Person     [] Place  []Time               [] Confused  [] Lethargic  [] Medicated  [] Non-responsive  [] Non Verbal GI / ABDOMEN:                     [] Flat    [] Distended    [x] Soft    [] Firm   []  Obese                   [] Diarrhea   [] FMS [x] Bowel Sounds  [] Nausea  [] Vomiting                   [] NGT  [] OGT    [] PEG  [] Tube Feedings @     / URINE ASSESSMENT:                   [] Voiding  []  Aragon  [x] Oliguria  [] Anuria                     [] Incontinent  []  Incontinent Brief   []  PureWick     PAIN:  [x] 0 []1  []2   []3   []4   []5   []6   []7   []8   []9   []10                MOBILITY:  [x] Bed    [] Stretcher      All Vitals and Treatment Details on Attached [address]: [facility]          Room # WP68/93    [x] Routine         [] 1st Time Acute/Chronic   [] Urgent      [] Stat            [] Acute Room   []  Bedside    [] ICU/CCU     [x] ER   Isolation Precautions:  [x] Dialysis    There are currently no Active Isolations     ALLERGIES:     Allergies   Allergen Reactions    Ceftriaxone Itching     Rxn in ER recorded 2007      Aspirin Hives    Pcn [Penicillins] Hives      Code Status:  Prior     Hepatitis Status      Lab Results   Component Value Date/Time    Hepatitis B surface Ag 0.15 02/14/2022 03:20 PM    Hepatitis B surface Ab 115.39 02/14/2022 03:20 PM    Hep B Core Ab, IgM Negative 02/17/2022 08:22 PM    Hep B Core Ab, total Positive (A) 02/17/2022 08:22 PM        Current Labs:      Lab Results   Component Value Date/Time    WBC 12.2 03/28/2022 09:30 PM    HGB 8.8 (L) 03/28/2022 09:30 PM    HCT 28.9 (L) 03/28/2022 09:30 PM    PLATELET 685 07/53/7858 09:30 PM    .8 (H) 03/28/2022 09:30 PM     Lab Results   Component Value Date/Time    Sodium 137 03/28/2022 09:30 PM    Potassium 5.0 03/28/2022 09:30 PM    Chloride 105 03/28/2022 09:30 PM    CO2 22 03/28/2022 09:30 PM    Anion gap 10 03/28/2022 09:30 PM    Glucose 139 (H) 03/28/2022 09:30 PM    BUN 77 (H) 03/28/2022 09:30 PM    Creatinine 9.28 (H) 03/28/2022 09:30 PM    BUN/Creatinine ratio 8 (L) 03/28/2022 09:30 PM    GFR est AA 5 (L) 03/28/2022 09:30 PM    GFR est non-AA 4 (L) 03/28/2022 09:30 PM    Calcium 8.7 03/28/2022 09:30 PM          DIET:  None     PRIMARY NURSE REPORT:   Pre Dialysis: Hunter Finley RN    Time: 0      EDUCATION:    [x] Patient           Knowledge Basis: [x]None []Minimal [] Substantial [] Unknown  Barriers to learning confused  []N/A  [] Intubated/Trached/Ventilated  [] Sedated/Paralyzed   [] Access Care     [] S&S of infection  [] Fluid Management  [] K+   [x] Procedural    [] Medications   [] Tx Options   [] Transplant   [] Diet      Teaching Tools:  [x] Explain  [] Demo  [] Handouts [] Video  Patient response: [] Verbalized understanding   [x] Requires follow up        [x] Time Out/Safety Check    [x] Extracorporeal Circuit Tested for integrity       RO/HEMODIALYSIS MACHINE SAFETY CHECKS  Before each treatment:        [address] [place] [address]                                       [] Portable Machine #4/RO serial # [id number]                                                                                                     Alarm Test:  Pass time 6996            [x] RO/Machine Log Complete    Machine Temp    36-37*C             Dialysate: pH 7.4    Conductivity: Meter 14.2   HD Machine  14.2     TCD: 14  Dialyzer Lot # [id number]     Blood Tubing Lot # [id number]     Saline Lot # [id number]     CHLORINE TESTING-Before each treatment and every 4 hours    Total Chlorine: [x] less than 0.1 ppm  Initial Time Check: 0400       4 Hr/2nd Check Time:    (if greater than 0.1 ppm from Primary then every 30 minutes from Secondary)     TREATMENT INITIATION  with Dialysis Precautions:   [x] All Connections Secured              [x] Saline Line Double Clamped   [x] Venous Parameters Set               [x] Arterial Parameters Set    [x] Prime Given 250ml NSS              [x]Air Foam Detector Engaged      Treatment Initiation Note:  Assessed  Patient and deem stable for treatment. On Bipap  Machine sating 100 %.  Right chest TDC Accessed with no signs or symptoms of complication. Treatment initiated      During Treatment Notes:  0430  Vascular access visible with arterial and venous line connections intact. Pt resting comfortably. 6849  Vascular access visible with arterial and venous line connections intact. Pt resting comfortably. 0500  Vascular access visible with arterial and venous line connections intact. Pt resting comfortably. 0530  Vascular access visible with arterial and venous line connections intact. Pt resting comfortably. 5412  Vascular access visible with arterial and venous line connections intact. Pt resting comfortably. 0600  Vascular access visible with arterial and venous line connections intact. Pt resting comfortably. 0180  Vascular access visible with arterial and venous line connections intact. Pt resting comfortably. 0630  Vascular access visible with arterial and venous line connections intact. Pt resting comfortably. 5779  Vascular access visible with arterial and venous line connections intact. Pt resting comfortably. 0700  Treatment stopped early due to c/o cramping to the right thigh.        Medication Dose Volume Route Time Jasmyne Nurse, Title      HD  [employer], RN      HD  [employer], RN      HD  [employer], RN     Post Assessment  Dialyzer Cleared:   [] Good  [x] Fair  [] Poor  Blood processed:  60 L  UF Removed:  2500 Ml    Post /92   Pulse  99 Resp  23  Temp 97.9 Lungs: [] Clear [x] Course  [] Crackles                 []  Wheezing   [x] Diminished   Post Tx Vascular Access: [x] N/A Cardiac :[] Regular   [] Irregular   Rhythm:  [x] Monitored   [] Not Monitored    CVC Catheter: [] N/A  Locking solution: Heparin 1:1000 U  Arterial port 1.9 ml   Venous port 2.0 ml   Edema:  [x] None  [] Generalized                     Skin:[x] Warm  [x] Dry [] Diaphoretic               [] Flushed  [] Pale [] Cyanotic Pain:  [x]0  []1 []2  []3 []4  []5  []6  []7 []8    []9  []10     Post Treatment Note:   Treatment ended 20 early due to c/o cramping to the right thigh.  200 ml of fluid removed      POST TREATMENT PRIMARY NURSE HANDOFF REPORT:   Post Dialysis: Nellie Thapa RN               Time:  0763       Abbreviations: AVG-arterial venous graft, AVF-arterial venous fistula, IJ-Internal Jugular, Subcl-Subclavian, Fem-Femoral, Tx-treatment, AP/HR-apical heart rate, VSS- Vital Signs Stable, CVC- Central Venous Catheter, DFR-dialysate flow rate, BFR-blood flow rate, AP-arterial pressure, -venous pressure, UF-ultrafiltrate, TMP-transmembrane pressure, Vic-Venous, Art-Arterial, RO-Reverse Osmosis

## 2022-03-29 NOTE — ED TRIAGE NOTES
Patient c/o shortness of breath. Patient is altered, restless, and pulling at the CPAP. Patient is a dialysis patient last treatment is unknown.

## 2022-03-29 NOTE — ED PROVIDER NOTES
HPI     63 y.o. F w/PmHx of ESRD on iHD (TTS), anemia, schizophrenia, pulmonary TB presents to the ED by way of EMS for evaluation of acute dyspnea. Family activated EMS for overt exacerbation of her baseline dyspnea; requiring rapid escalation to CPAP in order to maintain saturations. Upon arrival pt was restless/agitated and making effort to remove mask; apparently pt carries new Dx of dementia. Aside from pt dyspnea does not endorse any novel/consitutional symptoms, albeit with questionable veracity given her current level of mentation. Of note, pt came by EMS in old paper scrubs; likely from previous hospital stay 21-23Mar where she was treated for acute hypoxic respiratory failure requiring BiPAP and repeat dialysis. After transition to BiPAP w/low dose ativan and nitro drip; pt is tearful;/regretful about her poor compliance (food/fluid intake) that she sees a comparable to a [facility] sentence for her daughter; at this time her only additional complaint aside from her dyspnea is some LE cramping. Past Medical History:   Diagnosis Date    [facility] ([facility]     AIDS (acquired immune deficiency syndrome) ([facility]     Anemia     Esophageal candidiasis (HCC)     ESRD (end stage renal disease) (HCC)     HCV (hepatitis C virus)     Hepatitis B     Hepatitis C     HIV (human immunodeficiency virus infection) ([facility]     HIV (human immunodeficiency virus infection) ([facility]     Late latent syphilis     Pulmonary TB     Schizoaffective disorder ([facility]     Substance abuse ([facility]        Past Surgical History:   Procedure Laterality Date    HX GYN      HX NEPHRECTOMY      left kidney removed at age 17    HX NEPHRECTOMY           No family history on file.     Social History     Socioeconomic History    Marital status: LEGALLY      Spouse name: Not on file    Number of children: Not on file    Years of education: Not on file    Highest education level: Not on file   Occupational History  Not on file   Tobacco Use    Smoking status: Not on file    Smokeless tobacco: Not on file   Substance and Sexual Activity    Alcohol use: Not on file    Drug use: Not on file    Sexual activity: Not on file   Other Topics Concern    Not on file   Social History Narrative    Not on file     Social Determinants of Health     Financial Resource Strain:     Difficulty of Paying Living Expenses: Not on file   Food Insecurity:     Worried About Running Out of Food in the Last Year: Not on file    Alphonse of Food in the Last Year: Not on file   Transportation Needs:     Lack of Transportation (Medical): Not on file    Lack of Transportation (Non-Medical): Not on file   Physical Activity:     Days of Exercise per Week: Not on file    Minutes of Exercise per Session: Not on file   Stress:     Feeling of Stress : Not on file   Social Connections:     Frequency of Communication with Friends and Family: Not on file    Frequency of Social Gatherings with Friends and Family: Not on file    Attends [employer] Services: Not on file    Active Member of [address] [place] [address] or Organizations: Not on file    Attends Club or Organization Meetings: Not on file    Marital Status: Not on file   Intimate Partner Violence:     Fear of Current or Ex-Partner: Not on file    Emotionally Abused: Not on file    Physically Abused: Not on file    Sexually Abused: Not on file   Housing Stability:     Unable to Pay for Housing in the Last Year: Not on file    Number of [place] in the Last Year: Not on file    Unstable Housing in the Last Year: Not on file         ALLERGIES: Ceftriaxone, Aspirin, and Pcn [penicillins]    Review of Systems   Constitutional: Positive for fatigue. Negative for activity change, appetite change, chills, diaphoresis, fever and unexpected weight change. HENT: Negative. Eyes: Negative. Respiratory: Positive for chest tightness and shortness of breath. Negative for wheezing and stridor. Cardiovascular: Negative. Gastrointestinal: Negative. Genitourinary: Negative. Musculoskeletal: Negative. Skin: Negative. Psychiatric/Behavioral: Positive for agitation. Vitals:    03/28/22 2042   BP: (!) 239/156   Pulse: (!) 135   Resp: 27            Physical Exam  Constitutional:       General: She is in acute distress. HENT:      Head: Normocephalic and atraumatic. Mouth/Throat:      Mouth: Mucous membranes are moist.   Eyes:      Extraocular Movements: Extraocular movements intact. Cardiovascular:      Rate and Rhythm: Regular rhythm. Tachycardia present. Pulmonary:      Effort: Tachypnea, accessory muscle usage and respiratory distress present. Breath sounds: Rales present. Comments: On BiPAP  Abdominal:      General: Bowel sounds are normal.      Palpations: Abdomen is soft. Tenderness: There is no abdominal tenderness. There is no guarding or rebound. Musculoskeletal:         General: Normal range of motion. Cervical back: Normal range of motion and neck supple. Right lower leg: No tenderness. No edema. Left lower leg: No tenderness. No edema. Skin:     General: Skin is warm and dry. Capillary Refill: Capillary refill takes less than 2 seconds. MDM  Number of Diagnoses or Management Options  Diagnosis management comments: Based on HPI/PE in he presents with complaint of dyspnea/respiratory distress requring BiPAP/Nitro drip/Lasix  concern for Pulmonary edema vs hypertensive urgency vs ESRD     Patient overall in acute distress, increased work of breathing, and hypoxic upon arrival to the emergency department. Will obtain lab work and imaging for further evaluation of patients complaint. Will continue to monitor and evaluate patient while in the ED.     CBC - Hgb 8.8/Hct 28.9/.8  CMP - BUN 77/Cr 9.28/Alk phos 149  Mg - 2.0  Lactic Acid (formal) - 1.83  Trop - 18  BNP - 19,375    Abg pH 7.25/CO2 45.5/HCO3 19.9/O2 100%    EKG - nml axis/nml sinus tach (significant artifact 2/2 body movement)  Repeat EKG - concerning for peak T -waves will get dialysis started ASAP    Pt care assumed by Dr. Anny Bruce        ED Course as of 03/29/22 0524   Mon Mar 28, 2022   2155 HGB(!): 8.8 [DW]   2155 pH (POC)(!): 7.25 [DW]   2156 Lactic Acid (POC)(!!): 4.16 [DW]   2156 Calcium, ionized (POC)(!): 1.00 [DW]   2156 WBC: 12.2 [DW]   2156 Potassium (POC)(!): 5.5 [DW]   2202 Lactic Acid (POC): 1.83 [DW]   2214 Spoke w/Dr. Ortiz Space; plan to start dialysis while in the ED for acute hypoxic respiratory failure.  [DW]   0549 Planned dialysis at 48 [DW]   Tue Mar 29, 2022   0201 Pt reassessed; resting comfortably tolerating BiPAP waiting on dialysis; 200 mL fluid out after lasix  [DW]   0252 Pt reassessed; endorsing transient LE cramping  and is tearful over her past non-compliance w/sneaking water while in the bathroom    [DW]   0306 Respiratory to reassess pt for weaning off BiPAP; will start dialysis shortly [DW]      ED Course User Index  [DW] Madeline Inman MD       Procedures

## 2022-03-29 NOTE — ED NOTES
7:46 AM :Pt care assumed from Dr. Treva Ward , ED provider. Pt complaint(s), current treatment plan, progression and available diagnostic results have been discussed thoroughly. Rounding occurred: yes  Intended Disposition: Ween Bipap and follow after HD  Pending diagnostic reports and/or labs (please list): Clinical reevaluation    The patient has completed hemodialysis and is off oxygen. The patient is complaining of lower extremity pain. The patient had a repeat troponin done after dialysis showed that his gone to 60 which is an increase from 18 earlier. I discussed the case with Dr. Nilson Feliciano and said he is not sure if there is any significance to the troponin and I discussed with him how things been going as an outpatient. He notes that the patient supposed to follow with [facility] and is to have home health however has not been established as far as he can tell. I called [facility] and they are not following the patient and the patient is an infectious disease patient at [facility] and not theirs. I will briefly discussed the efforts to arrange outpatient care plans with care management given the patient's been admitted twice in the past month. The patient has had several admissions recently and just moved to the area. We will continue to follow patient, repeat the troponin to see if it continues to trend upwards and reevaluate. Alona Manriquez DO 11:14 AM    I was able to speak with the patient's daughter who is very insightful. The patient was recently moved from [facility] after she was admitted to the hospital with tuberculosis, and has latent syphilis, late stage AIDS, and is a developing dementia. The patient is now living with her daughter and family.   The patient has long-term memory issues and cannot be compliant with the fluid restrictions that are necessary to keep her in the hospital.  Patient's family has put off the water and monitor some when she drinks but it does not seem to help especially in the weekends when she gets to water she tends to drink too much. The patient has the department health coming by daily to watch her take her medications for latent tuberculosis, home physical therapy coming, and home health. The patient's daughter does not feel he can need any more resources at home and said they are able to manage her at home however there is can stop her from getting fluid overloaded before her next dialysis to the weekend. I discussed the case at length with Dr. Taye Barraza and he will try to take more fluid off on the Saturday run to prevent further episodes happening again. Dr. Taye Barraza feels like it is okay for the patient to be discharged and I will discharge the patient home. In addition the patient follows with her infectious disease doctor for the TB and HIV and also follows with dialysis twice a week but only struggles in the weekend. The patient has a primary doctor which is [facility] however she has not been able to actually see them because of all the appointment she is been having. The patient's family does not feel he continue her resources and will come pick the patient up when she is ready to go. Mary Anne Gracia DO 11:39 AM    Workup and recommendations were reviewed with the patient and all questions were answered. The patient understands the plan and will proceed with close outpatient care. I have encouraged the patient to return if at all worsened or concerned.    Mary Anne Gracia DO 11:40 AM

## 2022-03-29 NOTE — ED NOTES
I have reviewed discharge and prescription instructions with the patient. The patient verbalized understanding. Denies pain, remains alert and transfers with assist. No acute distress noted. Needs met. Daughter phoned to transport patient home. Patient provided sandwich and drink prior to discharge.

## 2022-03-29 NOTE — PROGRESS NOTES
ESRD patient is back again with similar Problem with acute Respiratory Failure,requiring BIPAP, Nitro drip, Lab & chest X-ray reviewed. Will need dialysis tonight,Dialysis orders given to on call Dialysis nurse.

## 2022-03-29 NOTE — ED NOTES
Assumed care of patient. Patient completed dialysis at the bedside and  taken off of BiPap. Placed on NC at 4L/min. [id number]%. Nitro drip on hold  due to SBP being 138.

## 2022-03-29 NOTE — PROGRESS NOTES
Progress Note    Leigha Romano  63 y.o. Admit Date: 3/28/2022  Active Problems:    Allergic reaction (11/8/2014) POA: Yes      ESRD (end stage renal disease) on dialysis ([facility] (10/25/2021) POA: Yes      History of cocaine abuse ([facility] (11/8/2021) POA: Yes      Cardiac volume overload (3/15/2022) POA: Yes      Anemia (3/15/2022) POA: Yes      Dementia ([facility] (3/29/2022) POA: Unknown            Subjective:     Patient is coming to the hospital very often. This is her third visit in the emergency room matter of 1 week for volume overload. He has history of ESRD. He did get her last dialysis on last Saturday as scheduled and again came into volume overload on Monday night. Patient is not compliant with the fluid restriction. She has numerous medical problems including dementia aids hepatitis B hypertension cardiac problem and ESRD and also has history of cocaine use. At the time of presentation he becomes quite restless and hypoxic requires BiPAP with some care the symptom goes away and tolerates nicely last night or this morning we had to dialyze until 3:00 in the morning and taken off around 2500 cc without any problem. Currently patient is totally asymptomatic no complaint admits that she cannot control her depression for fluid and also have some pain and all the medicine she needs to take that because of the problem  She is demented she has a fistula on the left arm not interested excellent fistula but most of the time despite ordering not to draw IV line on the left leg emergency room or less. Left. She has a dialysis catheter on the right IJ. A comprehensive review of systems was negative except for that written in the History of Present Illness.     Objective:     Visit Vitals  BP (!) 156/86   Pulse 90   Temp (!) 96.7 °F (35.9 °C) (Axillary)   Resp 26   Ht 5' 1\" (1.549 m)   Wt 52.2 kg (115 lb)   SpO2 100%   BMI 21.73 kg/m²         Intake/Output Summary (Last 24 hours) at 3/29/2022 1152  Last data filed at 3/29/2022 0715  Gross per 24 hour   Intake 118.72 ml   Output 2500 ml   Net -2381.28 ml       Current Facility-Administered Medications   Medication Dose Route Frequency Provider Last Rate Last Admin    nitroglycerin (TRIDIL) 400 mcg/ml infusion  0-20 mcg/min IntraVENous TITRATE James Steen MD   IV Completed at 03/29/22 0240     Current Outpatient Medications   Medication Sig Dispense Refill    QUEtiapine (SEROquel) 25 mg tablet Take 25 mg by mouth nightly.  calcium acetate,phosphat bind, (PHOSLO) 667 mg cap Take 2 Capsules by mouth three (3) times daily (with meals).  tcdqglfaf-ygrzylxk-nrgwasz ala (BIKTARVY) tab tablet Take 1 Tablet by mouth daily.  atovaquone (MEPRON) 750 mg/5 mL suspension Take 10 mL by mouth daily (with breakfast) for 30 days. 300 mL 0    amLODIPine (NORVASC) 10 mg tablet Take 1 Tablet by mouth daily. 30 Tablet 0    azithromycin (ZITHROMAX) 600 mg tablet Take 2 Tablets by mouth every seven (7) days. 30 Tablet 0    carvediloL (COREG) 6.25 mg tablet Take 1 Tablet by mouth two (2) times daily (with meals). 60 Tablet 0    losartan (COZAAR) 100 mg tablet Take 1 Tablet by mouth daily. 30 Tablet 0        Physical Exam:     Physical Exam:   General:  Alert, cooperative, no distress, appears stated age. Neck: Supple, symmetrical, trachea midline, no adenopathy, thyroid: no enlargement/tenderness/nodules, no carotid bruit and no JVD. Lungs:   Clear to auscultation bilaterally. Heart:  Regular rate and rhythm, S1, S2 normal, no murmur, click, rub or gallop. Abdomen:   Soft, non-tender. Bowel sounds normal. No masses,  No organomegaly. Extremities: Extremities normal, atraumatic, no cyanosis or edema, AV fistula left arm has good thrill and bruit dialysis catheter on the right IJ is well dressed.    Skin: Skin color, texture, turgor normal. No rashes or lesions         Data Review:    CBC w/Diff    Recent Labs     03/28/22  2130   WBC 12.2   RBC 2.81*   HGB 8.8*   HCT 28.9*   .8*   MCH 31.3   MCHC 30.4*   RDW 15.9*    Recent Labs     03/28/22 2130   MONOS 8   EOS 4   BASOS 1   RDW 15.9*        Comprehensive Metabolic Profile    Recent Labs     03/28/22 2130      K 5.0      CO2 22   BUN 77*   CREA 9.28*    Recent Labs     03/28/22 2130   CA 8.7   ALB 3.8   TP 8.9*   TBILI 0.3                        Impression:       Active Hospital Problems    Diagnosis Date Noted    Dementia ([facility] 03/29/2022    Cardiac volume overload 03/15/2022    Anemia 03/15/2022    History of cocaine abuse ([facility] 11/08/2021    ESRD (end stage renal disease) on dialysis ([facility] 10/25/2021    Allergic reaction 11/08/2014      Postdialysis and after volume removal she feels much better. Plan:     Renal point probably she can be discharged. She has a elevated troponin that should be followed if the troponin remains the same or trend. She could be discharged. We discussed with the dialysis unit to increase her dialysis time and also strictly implement fluid restriction of 1200 cc in 24 hours. Hopefully that will prevent her visiting the emergency room more often. Also make sure that hospital making note that her low blood pressure or blood should be done left arm this is a nice fistula and we need to salvage this fistula. And of care was discussed with Dr. Warden Jackson of the emergency room and also had a long discussion with her daughter.       David Dominguez MD

## 2022-04-09 ENCOUNTER — HOSPITAL ENCOUNTER (EMERGENCY)
Age: 62
Discharge: HOME | End: 2022-04-09
Attending: EMERGENCY MEDICINE
Payer: MEDICAID

## 2022-04-09 ENCOUNTER — APPOINTMENT (OUTPATIENT)
Dept: GENERAL RADIOLOGY | Age: 62
End: 2022-04-09
Attending: STUDENT IN AN ORGANIZED HEALTH CARE EDUCATION/TRAINING PROGRAM
Payer: MEDICAID

## 2022-04-09 VITALS
HEART RATE: 76 BPM | WEIGHT: 115 LBS | RESPIRATION RATE: 15 BRPM | OXYGEN SATURATION: 95 % | BODY MASS INDEX: 21.71 KG/M2 | HEIGHT: 61 IN | TEMPERATURE: 97.5 F | DIASTOLIC BLOOD PRESSURE: 83 MMHG | SYSTOLIC BLOOD PRESSURE: 132 MMHG

## 2022-04-09 DIAGNOSIS — E87.70 HYPERVOLEMIA, UNSPECIFIED HYPERVOLEMIA TYPE: ICD-10-CM

## 2022-04-09 DIAGNOSIS — Z99.2 ESRD (END STAGE RENAL DISEASE) ON DIALYSIS (HCC): ICD-10-CM

## 2022-04-09 DIAGNOSIS — N18.6 ESRD (END STAGE RENAL DISEASE) ON DIALYSIS (HCC): ICD-10-CM

## 2022-04-09 DIAGNOSIS — J96.01 ACUTE RESPIRATORY FAILURE WITH HYPOXIA (HCC): Primary | ICD-10-CM

## 2022-04-09 DIAGNOSIS — J81.0 ACUTE PULMONARY EDEMA (HCC): ICD-10-CM

## 2022-04-09 LAB
ANION GAP SERPL CALC-SCNC: 13 MMOL/L (ref 3–18)
ARTERIAL PATENCY WRIST A: POSITIVE
ATRIAL RATE: 125 BPM
BASE DEFICIT BLD-SCNC: 8.7 MMOL/L
BASOPHILS # BLD: 0 K/UL (ref 0–0.1)
BASOPHILS NFR BLD: 0 % (ref 0–2)
BDY SITE: ABNORMAL
BNP SERPL-MCNC: ABNORMAL PG/ML (ref 0–900)
BUN SERPL-MCNC: 46 MG/DL (ref 7–18)
BUN/CREAT SERPL: 6 (ref 12–20)
CALCIUM SERPL-MCNC: 8.8 MG/DL (ref 8.5–10.1)
CALCULATED P AXIS, ECG09: 66 DEGREES
CALCULATED R AXIS, ECG10: 36 DEGREES
CALCULATED T AXIS, ECG11: 87 DEGREES
CHLORIDE SERPL-SCNC: 107 MMOL/L (ref 100–111)
CO2 SERPL-SCNC: 19 MMOL/L (ref 21–32)
CREAT SERPL-MCNC: 8.15 MG/DL (ref 0.6–1.3)
DIAGNOSIS, 93000: NORMAL
DIFFERENTIAL METHOD BLD: ABNORMAL
EOSINOPHIL # BLD: 0.5 K/UL (ref 0–0.4)
EOSINOPHIL NFR BLD: 5 % (ref 0–5)
ERYTHROCYTE [DISTWIDTH] IN BLOOD BY AUTOMATED COUNT: 14.8 % (ref 11.6–14.5)
GAS FLOW.O2 O2 DELIVERY SYS: ABNORMAL L/MIN
GAS FLOW.O2 SETTING OXYMISER: 12 BPM
GLUCOSE SERPL-MCNC: 191 MG/DL (ref 74–99)
HCO3 BLD-SCNC: 17.2 MMOL/L (ref 22–26)
HCT VFR BLD AUTO: 26.3 % (ref 35–45)
HGB BLD-MCNC: 7.9 G/DL (ref 12–16)
IMM GRANULOCYTES # BLD AUTO: 0 K/UL
IMM GRANULOCYTES NFR BLD AUTO: 0 %
LYMPHOCYTES # BLD: 3.8 K/UL (ref 0.9–3.6)
LYMPHOCYTES NFR BLD: 40 % (ref 21–52)
MCH RBC QN AUTO: 31.9 PG (ref 24–34)
MCHC RBC AUTO-ENTMCNC: 30 G/DL (ref 31–37)
MCV RBC AUTO: 106 FL (ref 78–100)
MONOCYTES # BLD: 0.8 K/UL (ref 0.05–1.2)
MONOCYTES NFR BLD: 8 % (ref 3–10)
NEUTS SEG # BLD: 4.4 K/UL (ref 1.8–8)
NEUTS SEG NFR BLD: 47 % (ref 40–73)
NRBC # BLD: 0 K/UL (ref 0–0.01)
NRBC BLD-RTO: 0 PER 100 WBC
O2/TOTAL GAS SETTING VFR VENT: 40 %
P-R INTERVAL, ECG05: 122 MS
PCO2 BLD: 36.4 MMHG (ref 35–45)
PH BLD: 7.28 [PH] (ref 7.35–7.45)
PIP ISTAT,IPIP: 16
PLATELET # BLD AUTO: 340 K/UL (ref 135–420)
PLATELET COMMENTS,PCOM: ABNORMAL
PMV BLD AUTO: 9.6 FL (ref 9.2–11.8)
PO2 BLD: 88 MMHG (ref 80–100)
POTASSIUM SERPL-SCNC: 4.9 MMOL/L (ref 3.5–5.5)
Q-T INTERVAL, ECG07: 322 MS
QRS DURATION, ECG06: 64 MS
QTC CALCULATION (BEZET), ECG08: 464 MS
RBC # BLD AUTO: 2.48 M/UL (ref 4.2–5.3)
RBC MORPH BLD: ABNORMAL
RBC MORPH BLD: ABNORMAL
SAO2 % BLD: 95.5 % (ref 92–97)
SERVICE CMNT-IMP: ABNORMAL
SODIUM SERPL-SCNC: 139 MMOL/L (ref 136–145)
SPECIMEN TYPE: ABNORMAL
TOTAL RESP. RATE, ITRR: 556
TROPONIN-HIGH SENSITIVITY: 18 NG/L (ref 0–54)
VENTILATION MODE VENT: ABNORMAL
VENTRICULAR RATE, ECG03: 125 BPM
VT SETTING VENT: 360 ML
WBC # BLD AUTO: 9.5 K/UL (ref 4.6–13.2)

## 2022-04-09 PROCEDURE — 85025 COMPLETE CBC W/AUTO DIFF WBC: CPT

## 2022-04-09 PROCEDURE — 36600 WITHDRAWAL OF ARTERIAL BLOOD: CPT

## 2022-04-09 PROCEDURE — 82803 BLOOD GASES ANY COMBINATION: CPT

## 2022-04-09 PROCEDURE — 93005 ELECTROCARDIOGRAM TRACING: CPT

## 2022-04-09 PROCEDURE — 96365 THER/PROPH/DIAG IV INF INIT: CPT

## 2022-04-09 PROCEDURE — 99285 EMERGENCY DEPT VISIT HI MDM: CPT

## 2022-04-09 PROCEDURE — 96375 TX/PRO/DX INJ NEW DRUG ADDON: CPT

## 2022-04-09 PROCEDURE — 74011250636 HC RX REV CODE- 250/636: Performed by: INTERNAL MEDICINE

## 2022-04-09 PROCEDURE — 96366 THER/PROPH/DIAG IV INF ADDON: CPT

## 2022-04-09 PROCEDURE — 74011250636 HC RX REV CODE- 250/636: Performed by: STUDENT IN AN ORGANIZED HEALTH CARE EDUCATION/TRAINING PROGRAM

## 2022-04-09 PROCEDURE — 90935 HEMODIALYSIS ONE EVALUATION: CPT

## 2022-04-09 PROCEDURE — 74011000250 HC RX REV CODE- 250: Performed by: STUDENT IN AN ORGANIZED HEALTH CARE EDUCATION/TRAINING PROGRAM

## 2022-04-09 PROCEDURE — 94660 CPAP INITIATION&MGMT: CPT

## 2022-04-09 PROCEDURE — 83880 ASSAY OF NATRIURETIC PEPTIDE: CPT

## 2022-04-09 PROCEDURE — 80048 BASIC METABOLIC PNL TOTAL CA: CPT

## 2022-04-09 PROCEDURE — 84484 ASSAY OF TROPONIN QUANT: CPT

## 2022-04-09 PROCEDURE — 71045 X-RAY EXAM CHEST 1 VIEW: CPT

## 2022-04-09 RX ORDER — MORPHINE SULFATE 4 MG/ML
4 INJECTION INTRAVENOUS ONCE
Status: DISCONTINUED | OUTPATIENT
Start: 2022-04-09 | End: 2022-04-09 | Stop reason: HOSPADM

## 2022-04-09 RX ORDER — NITROGLYCERIN 40 MG/100ML
0-400 INJECTION INTRAVENOUS
Status: DISCONTINUED | OUTPATIENT
Start: 2022-04-09 | End: 2022-04-09 | Stop reason: HOSPADM

## 2022-04-09 RX ORDER — HEPARIN SODIUM 1000 [USP'U]/ML
1000 INJECTION, SOLUTION INTRAVENOUS; SUBCUTANEOUS ONCE
Status: COMPLETED | OUTPATIENT
Start: 2022-04-09 | End: 2022-04-09

## 2022-04-09 RX ORDER — HEPARIN SODIUM 1000 [USP'U]/ML
4000 INJECTION, SOLUTION INTRAVENOUS; SUBCUTANEOUS
Status: DISCONTINUED | OUTPATIENT
Start: 2022-04-09 | End: 2022-04-09 | Stop reason: HOSPADM

## 2022-04-09 RX ORDER — MORPHINE SULFATE 2 MG/ML
1 INJECTION, SOLUTION INTRAMUSCULAR; INTRAVENOUS
Status: COMPLETED | OUTPATIENT
Start: 2022-04-09 | End: 2022-04-09

## 2022-04-09 RX ADMIN — MORPHINE SULFATE 1 MG: 2 INJECTION, SOLUTION INTRAMUSCULAR; INTRAVENOUS at 04:46

## 2022-04-09 RX ADMIN — NITROGLYCERIN 200 MCG/MIN: 40 INJECTION INTRAVENOUS at 02:39

## 2022-04-09 RX ADMIN — HEPARIN SODIUM 4000 UNITS: 1000 INJECTION INTRAVENOUS; SUBCUTANEOUS at 08:15

## 2022-04-09 RX ADMIN — HEPARIN SODIUM 1000 UNITS: 1000 INJECTION INTRAVENOUS; SUBCUTANEOUS at 05:46

## 2022-04-09 NOTE — ED NOTES
Daughter/caregiver called and updated with pt discharge status.   Daughter will be here in one hour  Pt updated

## 2022-04-09 NOTE — PROGRESS NOTES
Consult Note    Patient: Kemi Silver               Sex: female          DOA: 4/9/2022         YOB: 1960      Age:  63 y.o.        LOS:  LOS: 0 days              HPI:     Kemi Silver is a 63 y.o. female who has been seen for the need of acute to dialysis for volume overload and hypoxia along with hypertensive urgency  She has an other of ESRD secondary to hypertension's with underlying AIDS, dementia hepatitis B, hepatitis C and other chronic medical problems. Patient presented around 3:00 in the morning with acute shortness of breath and requiring BiPAP as well as nitro drip for acute respiratory failure along with pulmonary edema and signs of volume overload. Patient has tendency of frequent visit to the emergency room with fluid overload. . She usually gets dialysis every Tuesday Thursday Saturday given these frequent visits to the emergency room I increased her frequency of outpatient dialysis treatment 4 times a week which she is not following. Given the need for urgent dialysis I had to find out the on-call dialysis nurse and arrange dialysis in the emergency room on urgent basis. By this time she has completed her dialysis treatment of nitro drip of BiPAP but uses oxygen by nasal cannula and removed around 3 L of fluid she feels much better can lay flat also.     Past Medical History:   Diagnosis Date    Acute kidney injury ([address]     AIDS (acquired immune deficiency syndrome) ([address]     Anemia     Esophageal candidiasis (HCC)     ESRD (end stage renal disease) (HCC)     HCV (hepatitis C virus)     Hepatitis B     Hepatitis C     [facility] immunodeficiency virus [facility]     HIV (human immunodeficiency virus infection) ([facility]     Late latent syphilis     Pulmonary TB     Schizoaffective disorder ([address]     Substance abuse ([facility]        Past Surgical History:   Procedure Laterality Date    HX GYN      HX NEPHRECTOMY      left kidney removed at age 17   [de-identified] NEPHRECTOMY         No family history on file. Social History     Socioeconomic History    Marital status: LEGALLY        Prior to Admission medications    Medication Sig Start Date End Date Taking? Authorizing Provider   QUEtiapine (SEROquel) 25 mg tablet Take 25 mg by mouth nightly. 2/22/22   Provider, Historical   calcium acetate,phosphat bind, (PHOSLO) 667 mg cap Take 2 Capsules by mouth three (3) times daily (with meals). 2/3/22   Provider, Historical   kjpxipvar-guwomoma-zofihih ala (BIKTARVY) tab tablet Take 1 Tablet by mouth daily. Provider, Historical   atovaquone (MEPRON) 750 mg/5 mL suspension Take 10 mL by mouth daily (with breakfast) for 30 days. 3/23/22 4/22/22  Tegan Jackson,    amLODIPine (NORVASC) 10 mg tablet Take 1 Tablet by mouth daily. 2/19/22   May Castillo DO   azithromycin (ZITHROMAX) 600 mg tablet Take 2 Tablets by mouth every seven (7) days. 2/21/22   May Castillo DO   carvediloL (COREG) 6.25 mg tablet Take 1 Tablet by mouth two (2) times daily (with meals). 2/18/22   May Castillo DO   losartan (COZAAR) 100 mg tablet Take 1 Tablet by mouth daily. 2/19/22   Ava Shipley DO       Allergies   Allergen Reactions    Ceftriaxone Itching     Rxn in ER recorded 2007      Aspirin Hives    Pcn [Penicillins] Hives       Review of Systems  A comprehensive review of systems was negative except for that written in the History of Present Illness. Physical Exam:      Visit Vitals  /83   Pulse 76   Temp 97.5 °F (36.4 °C) (Oral)   Resp 15   Ht 5' 1\" (1.549 m)   Wt 52.2 kg (115 lb)   SpO2 99%   BMI 21.73 kg/m²       Physical Exam:  Physical Exam:   General:  Alert, cooperative, no distress, appears stated age. Nose: Nares normal. Septum midline. Mucosa normal. No drainage or sinus tenderness.    Mouth/Throat: Lips, mucosa, and tongue normal. Teeth and gums normal.   Neck: Supple, symmetrical, trachea midline, no adenopathy, thyroid: no enlargement/tenderness/nodules, no carotid bruit and no JVD. Back:   Symmetric, no curvature. ROM normal. No CVA tenderness. Lungs:   Clear to auscultation bilaterally after dialysis. Heart:  Regular rate and rhythm, S1, S2 normal, no murmur, click, rub or gallop. Abdomen:   Soft, non-tender. Bowel sounds normal. No masses,  No organomegaly. Extremities: Extremities normal, atraumatic, no cyanosis or edema. ,HD catheter is well dressed,AVF on Left arm has good thrill & Bruit , not used yet. Skin: Skin color, texture, turgor normal. No rashes or lesions       Labs Reviewed:  BMP:   Lab Results   Component Value Date/Time     04/09/2022 02:30 AM    K 4.9 04/09/2022 02:30 AM     04/09/2022 02:30 AM    CO2 19 (L) 04/09/2022 02:30 AM    AGAP 13 04/09/2022 02:30 AM     (H) 04/09/2022 02:30 AM    BUN 46 (H) 04/09/2022 02:30 AM    CREA 8.15 (H) 04/09/2022 02:30 AM    GFRAA 6 (L) 04/09/2022 02:30 AM    GFRNA 5 (L) 04/09/2022 02:30 AM     CBC:   Lab Results   Component Value Date/Time    WBC 9.5 04/09/2022 02:30 AM    HGB 7.9 (L) 04/09/2022 02:30 AM    HCT 26.3 (L) 04/09/2022 02:30 AM     04/09/2022 02:30 AM   Study Result    Narrative & Impression   AP CHEST, PORTABLE     INDICATION: Dyspnea     COMPARISON: Chest x-ray 3/28/2022     FINDINGS:  EKG leads overlie the patient. Right IJ central catheter is in similar  position.     Stable mildly enlarged cardiac silhouette. Diffuse interstitial prominence,  slightly improved appearance when compared to most recent exam. No new  consolidation. Possible trace pleural effusions. No pneumothorax. No acute  osseous finding.     IMPRESSION     1. Stable mildly enlarged cardiac silhouette.  Interstitial edema versus  interstitial infiltrate, slightly improved in appearance since prior exam.     2. Possible trace pleural effusions         Assessment/Plan     Active Problems:    Hypoxia (10/25/2021)      ESRD (end stage renal disease) on dialysis ([facility] (10/25/2021)      Pulmonary edema (3/22/2022)      Hypertensive urgency (3/22/2022)        Patient successfully dialyzed and feels much better after dialysis no more signs of any volume overload. Patient is safe to go back to her home no need for any hospital admission. Epogen and other medicine should be as an outpatient.   Continue to educate her about fluid restriction of 32 ounces in 24 hours and also continue to teach her and the family in her stool come to the dialysis unit 4 times a week decrease the frequency of her emergency room visit and requiring urgent dialysis in the od hours

## 2022-04-09 NOTE — DIALYSIS
ACUTE HEMODIALYSIS FLOW SHEET    HEMODIALYSIS ORDERS: Physician: Dr. Silvana Asencio: Saman   Duration:  3 hr  BFR: 300   DFR: 600   Dialysate:  Temp 36   K+   2    Ca+  2.5   Na 138   Bicarb 35   Wt Readings from Last 1 Encounters:   04/09/22 52.2 kg (115 lb)   [x] Patient Chart     [] Unable to Obtain     Dry weight/UF Goal: 4000 ml               Access:  RIJ tunneled CVC     Heparin [x]  Bolus  1000  Units    [x] Hourly  500  Units    [] None      Catheter locking solution:  1:1000U Heparin   Pre BP:  153/85    Pulse:  96   Respirations: 18    Temperature:  97.9 oral    Tx: NSS   ml/Bolus   [x] N/A   Labs: []  Pre  []  Post   [x] N/A   Additional Orders(medications, blood products, hypotension management): [] Yes   [x] No     [x]  [facility] Consent Verified     CATHETER ACCESS:  []N/A   [x]Right   []Left   [x]IJ     []Fem      [] First use X-ray verified     [x]Tunneled    [] Non Tunneled   [x]No S/S infection  []Redness  []Drainage []Cultured []Swelling []Pain   [x]Medical Aseptic Prep Utilized   [x]Dressing Changed  [x] Biopatch  Date: 4/9/2022   []Clotted   [x]Patent   Flows: [x]Good  []Poor  []Reversed   If access problem,  notified: []Yes    [x]N/A          GENERAL ASSESSMENT:    LOC:    [x] Alert   [x]Oriented:  [x] Person  [x] Place  [x]Time              [] Confused  [] Non-Verbal [] Lethargic  [] Obtunded                [] Sedated   [] Agitated  [] Restless    []  Non-responsive        CARDIAC: [x]Regular      [] Irregular   [] Pericardial Rub  [] JVD          [x]  Monitored  [x] Bedside  [] Remotely monitored       LUNGS:   SaO2  97%   [x] Clear  [] Coarse  [] Crackles  [] Wheezing                                        [x] Diminished     Location : []RLL   []LLL    []RUL  []PAYAL   Cough: []Productive  []Dry  [x]N/A   Respirations:  [x]Easy  []Labored   Therapy:  []RA  [x]NC 4L/min    Mask: []NRB  [] Venti    O2%                  []Ventilator  []Intubated  [] Trach  [] BiPaP     GI / ABDOMEN:                     [] Flat    [] Distended    [x] Soft    [] Firm   []  Obese                   [] Diarrhea  [x] Bowel Sounds  [] Nausea  [] Vomiting                   [] Fecal Management System  [] Incontinent of stool      / URINE ASSESSMENT:                   [] Voiding   [] Oliguria  [x] Anuria   []  Aragon                  [] Incontinent of urine     SKIN:   [x] Warm  [] Hot  [] Cold   [] Cool   [x] Dry    [] Pale   [] Diaphoretic                  [] Flushed  [] Jaundiced  [] Cyanotic  [] Rash  [] Weeping     EDEMA: [] None  [x]Generalized  [] Pitting [] 1    [] 2    [] 3    [] 4                 [] Facial  [] Pedal  []  UE  [] LE     MOBILITY:  [] Bed    [x] Stretcher     PAIN:  [x] 0 []1  []2   []3   []4   []5   []6   []7   []8   []9   []10            Scale 0-10  Action/Follow Up:        All Vitals and Treatment Details on Attached [facility]: [facility]          Room # XX88/75   [] 1st Time Acute      [x] Stat       [] Routine      [] Urgent     [] Acute Room  []  Bedside  [] ICU/CCU  [x] ER   Isolation Precautions:  [x] Dialysis    There are currently no Active Isolations       ALLERGIES:     Allergies   Allergen Reactions    Ceftriaxone Itching     Rxn in ER recorded 2007      Aspirin Hives    Pcn [Penicillins] Hives      Code Status:  Prior     Hepatitis Status     Lab Results   Component Value Date/Time    Hepatitis B surface Ag 0.15 02/14/2022 03:20 PM    Hepatitis B surface Ab 115.39 02/14/2022 03:20 PM    Hep B Core Ab, IgM Negative 02/17/2022 08:22 PM    Hep B Core Ab, total Positive (A) 02/17/2022 08:22 PM        Current Labs:      Lab Results   Component Value Date/Time    WBC 9.5 04/09/2022 02:30 AM    HGB 7.9 (L) 04/09/2022 02:30 AM    HCT 26.3 (L) 04/09/2022 02:30 AM    PLATELET 065 62/14/9824 02:30 AM    .0 (H) 04/09/2022 02:30 AM     Lab Results   Component Value Date/Time    Sodium 139 04/09/2022 02:30 AM    Potassium 4.9 04/09/2022 02:30 AM    Chloride 107 04/09/2022 02:30 AM    CO2 19 (L) 04/09/2022 02:30 AM    Anion gap 13 04/09/2022 02:30 AM    Glucose 191 (H) 04/09/2022 02:30 AM    BUN 46 (H) 04/09/2022 02:30 AM    Creatinine 8.15 (H) 04/09/2022 02:30 AM    BUN/Creatinine ratio 6 (L) 04/09/2022 02:30 AM    GFR est AA 6 (L) 04/09/2022 02:30 AM    GFR est non-AA 5 (L) 04/09/2022 02:30 AM    Calcium 8.8 04/09/2022 02:30 AM          DIET:  None      PRIMARY NURSE REPORT:   Pre Dialysis:  DARIAN Ashraf RN     Time: 6394      EDUCATION:    [x] Patient [] Other           Knowledge Basis: []None [x]Minimal [] Substantial [] Unable to assess at this time.    Barriers to learning  [x]N/A   [] Access Care     [] S&S of infection  [x] Fluid Management  [] K+   [x] Procedural    []Albumin   [] Medications   [] Tx Options   [] Transplant   [] Diet   [] Other   Teaching Tools:  [x] Explain  [] Demo  [] Handouts [] Video  Patient response: [x] Verbalized understanding  [] Teach back  [] Return demonstration   [] Requires follow up      [x]Time Out/Safety Check  [x] Extracorporeal Circuit Tested for integrity       RO/HEMODIALYSIS MACHINE SAFETY CHECKS  Before each treatment:     Machine Number:                   [facility]                                     [x] Portable Machine #4/RO serial # [id number]                                                                                                         Alarm Test:  Pass time 3912            [x] RO/Machine Log Complete    Machine Temp    36.0             Dialysate: pH  7.4    Conductivity: Meter 13.8     HD Machine  14.2      TCD: 13.8  Dialyzer Lot # [id number]     Blood Tubing Lot # [id number]    Saline Lot # [id number]     CHLORINE TESTING-Before each treatment and every 4 hours    Total Chlorine: [x] less than 0.1 ppm  Initial Time Check: 0530       4 Hr/2nd Check Time:    (if greater than 0.1 ppm from Primary then every 30 minutes from Secondary)     TREATMENT INITIATION  with Dialysis Precautions:   [x] All Connections Secured              [x] Saline Line Double Clamped   [x] Venous Parameters Set               [x] Arterial Parameters Set    [x] Prime Given 250ml NSS              [x]Air Foam Detector Engaged      Treatment Initiation Note:  0530  Arrived to ED, pt in a temp room, asked nurse if pt can be moved to an actual room with her own sink. Pt will be moving to room 01, set HD machine in room 01.    0540  Pt transferred to room 01 via stretcher, pt alert and oriented to person, place, and time. Time out done with pt. Pt asked for additional blankets. During Treatment Notes:  1618  CVC assessed no abnormalities noted, line patent with good flow. Administered 1000U Heparin bolus as ordered. 0245  CVC accessed without any difficulty, pt tolerated well, HD started at this time. Vascular access visible with arterial and venous line connections intact. 0600  Pt screaming c/o leg cramp, reduced BFR down to 300 and encouraged pt to breath through her nose. Vascular access visible with arterial and venous line connections intact. 0615  Pt resting with eyes closed. Vascular access visible with arterial and venous line connections intact. 0630  Vascular access visible with arterial and venous line connections intact. 0645  VSS, vascular access visible with arterial and venous line connections intact. 0700  Vascular access visible with arterial and venous line connections intact. 0715  VSS, vascular access visible with arterial and venous line connections intact. 0730  Right IJ CVC drsg changed, site CDI, placed biopatch to insertion site, pt tolerated well. Vascular access visible with arterial and venous line connections intact. 0745  Pt c/o of right hand and arm cramping. Pt asked to take off self adherent wrap from right arm because it was too tight, noted wrap covering IV, informed pt not to pull on IV, pt verbalized understanding. Called primary nurse for pain meds, and turned off UFR.   Vascular access visible with arterial and venous line connections intact. 0654 Asked pt if cramp is better, pt states it's a little better. Informed pt that as soon as her primary nurse gives her pain medication, this nurse will restart her UF d/t her being fluid overloaded. Pt asked what causes fluid overload, informed pt it could be several factors like not adhering to fluid intake or missing dialysis treatments. Pt stated she's an addict and that her treatment was cocaine. Pt stated she went to her treatments, but did not complete them. Vascular access visible with arterial and venous line connections intact. 0800  Vascular access visible with arterial and venous line connections intact. 8886  Pt demanded to be taken off HD, pt continues to c/o cramping to her right hand despite UFR off. Primary RN getting orders for Tylenol. HD treatment stopped 43 min early, notified provider. Medication Dose Volume Route Time Jasmyne Nurse, Title   Heparin Bolus 1000U 1ml IVP venous cath 25 June Xenia, RN     Post Assessment  Dialyzer Cleared:[] Good [x] Fair  [] Poor  Blood processed:  40.1 L  Net UF Removed:  7980 Ml  Post /83  Pulse  76 Resp  15   Temp 97.5 Lungs:   [x] Clear   [] Course   [] Crackles    [] Wheezing   [] Diminished   Post Tx Vascular Access:   CVC Catheter:   Locking solution: Heparin 1:1000U  Arterial port 1.9 ml   Venous port 2.0 ml      Cardiac:   [x] Regular  [] Irregular  [] Monitor          Neuro: [x] Alert [x]Oriented: [x]Person [x]Place    [x]Time  [] Confused [] Lethargic  [] Obtunded [] Agitated [] Restless []  Non-responsive        Skin:[x] Warm  [x] Dry [] Diaphoretic             []Cool     [] Flushed  [] Pale            [] Cyanotic     Pain:  [x]0  []1 []2  []3 []4  []5  []6 []7 []8  []9  []10     Post Treatment Note:   0830  HD completed at this time, pt tolerated well. Dressing clean, dry and intact.         POST TREATMENT PRIMARY NURSE HANDOFF REPORT:   [facility]  [facility]                Time:  0938     Abbreviations: AVG-arterial venous graft, AVF-arterial venous fistula, IJ-Internal Jugular, Subcl-Subclavian, Fem-Femoral, Tx-treatment, AP/HR-apical heart rate, DFR-dialysate flow rate, BFR-blood flow rate, AP-arterial pressure, -venous pressure, UF-ultrafiltrate, TMP-transmembrane pressure, Vic-Venous, Art-Arterial, RO-Reverse Osmosis

## 2022-04-09 NOTE — ED PROVIDER NOTES
EMERGENCY DEPARTMENT HISTORY AND PHYSICAL EXAM      Date: 4/9/2022  Patient Name: Manuel Gan    History of Presenting Illness     Chief Complaint   Patient presents with    Shortness of Breath    Chest Pain         History Provided By:patient, son     Chief Complaint: SOB  Duration: 1 day  Timing: several hours prior to ED arrival  Severity: severe  Associated Symptoms: none      History (Context): Manuel Gan is a 63 y.o. female with a past medical history significant for ESRD (on dialysis TTS), schizophrenia, anemia, pulmonary TB comes into the ED today due to acute onset shortness of breath. Patient is a poor historian and son is unable to provide much significant history. Symptoms appear to have started acutely and several hours prior to arrival.  Uncertain whether or not patient is missed any dialysis sessions. However, she has noted history of missing them and presenting in respiratory distress secondary to fluid overload. Per her report, she believes she may have missed dialysis this past week. PCP: Unknown, Provider, MD    Current Facility-Administered Medications   Medication Dose Route Frequency Provider Last Rate Last Admin    nitroglycerin (TRIDIL) 400 mcg/ml infusion  0-400 mcg/min IntraVENous TITRATE Omi Jessica MD   Transfusion Completed at 04/09/22 0403    morphine injection 1 mg  1 mg IntraVENous NOW Omi Jessica MD         Current Outpatient Medications   Medication Sig Dispense Refill    QUEtiapine (SEROquel) 25 mg tablet Take 25 mg by mouth nightly.  calcium acetate,phosphat bind, (PHOSLO) 667 mg cap Take 2 Capsules by mouth three (3) times daily (with meals).  qfjwjrcdu-fuixgtam-peendag ala (BIKTARVY) tab tablet Take 1 Tablet by mouth daily.  atovaquone (MEPRON) 750 mg/5 mL suspension Take 10 mL by mouth daily (with breakfast) for 30 days. 300 mL 0    amLODIPine (NORVASC) 10 mg tablet Take 1 Tablet by mouth daily.  30 Tablet 0    azithromycin (ZITHROMAX) 600 mg tablet Take 2 Tablets by mouth every seven (7) days. 30 Tablet 0    carvediloL (COREG) 6.25 mg tablet Take 1 Tablet by mouth two (2) times daily (with meals). 60 Tablet 0    losartan (COZAAR) 100 mg tablet Take 1 Tablet by mouth daily. 30 Tablet 0       Past History     Past Medical History:   Past Medical History:   Diagnosis Date    [facility]     AIDS (acquired immune deficiency syndrome) [facility]     Anemia     Esophageal candidiasis (HCC)     ESRD (end stage renal disease) (HCC)     HCV (hepatitis C virus)     Hepatitis B     Hepatitis C     HIV (human immunodeficiency virus infection) [facility]     [facility]     Late latent syphilis     Pulmonary TB     Schizoaffective disorder ([facility]     Substance abuse ([facility]        Past Surgical History:  Past Surgical History:   Procedure Laterality Date    HX GYN      HX NEPHRECTOMY      left kidney removed at age 17   [facility] [place] NEPHRECTOMY         Family History:  No family history on file. Social History:   Social History     Tobacco Use    Smoking status: Not on file    Smokeless tobacco: Not on file   Substance Use Topics    Alcohol use: Not on file    Drug use: Not on file       Allergies: Allergies   Allergen Reactions    Ceftriaxone Itching     Rxn in ER recorded 2007      Aspirin Hives    Pcn [Penicillins] Hives       PMH, PSH, family history, social history, allergies reviewed with the patient with significant items noted above. Review of Systems   Review of Systems   Constitutional: Negative for chills and fever. HENT: Negative for rhinorrhea and sore throat. Eyes: Negative for pain and visual disturbance. Respiratory: Positive for shortness of breath. Negative for cough and wheezing. Cardiovascular: Negative for chest pain and leg swelling. Gastrointestinal: Negative for abdominal pain, nausea and vomiting. Endocrine: Negative for polyuria.    Genitourinary: Negative for dysuria and hematuria. Musculoskeletal: Negative for neck pain and neck stiffness. Skin: Negative for rash and wound. Neurological: Negative for dizziness, weakness and light-headedness. Physical Exam     Vitals:    04/09/22 0239 04/09/22 0300 04/09/22 0315 04/09/22 0330   BP: (!) 219/128 (!) 177/130 (!) 176/109 (!) 173/97   Pulse:  (!) 124 (!) 124 (!) 120   Resp:  (!) 36  29   SpO2:  98% 100% 100%   Weight:       Height:           Physical Exam  Vitals and nursing note reviewed. Constitutional:       General: She is in acute distress. Appearance: She is ill-appearing. She is not toxic-appearing. Comments: Acute respiratory distress   HENT:      Head: Normocephalic and atraumatic. Mouth/Throat:      Mouth: Mucous membranes are moist.   Eyes:      Extraocular Movements: Extraocular movements intact. Neck:      Vascular: No JVD. Cardiovascular:      Rate and Rhythm: Regular rhythm. Tachycardia present. Heart sounds: No murmur heard. No gallop. Pulmonary:      Effort: Tachypnea, accessory muscle usage and respiratory distress present. Breath sounds: Examination of the right-upper field reveals rales. Examination of the left-upper field reveals rales. Examination of the right-middle field reveals rales. Examination of the left-middle field reveals rales. Examination of the right-lower field reveals rales. Examination of the left-lower field reveals rales. Rales present. No wheezing. Chest:      Chest wall: No crepitus or edema. Abdominal:      General: Bowel sounds are normal.      Palpations: Abdomen is soft. Tenderness: There is no guarding. Musculoskeletal:      Right lower leg: No tenderness. No edema. Left lower leg: No tenderness. No edema. Skin:     General: Skin is warm. Capillary Refill: Capillary refill takes less than 2 seconds. Comments: Diaphoretic   Neurological:      Mental Status: She is alert. Comments:  At baseline per son   Psychiatric:         Mood and Affect: Mood is anxious. Diagnostic Study Results     Labs -     Recent Results (from the past 12 hour(s))   METABOLIC PANEL, BASIC    Collection Time: 04/09/22  2:30 AM   Result Value Ref Range    Sodium 139 136 - 145 mmol/L    Potassium 4.9 3.5 - 5.5 mmol/L    Chloride 107 100 - 111 mmol/L    CO2 19 (L) 21 - 32 mmol/L    Anion gap 13 3.0 - 18 mmol/L    Glucose 191 (H) 74 - 99 mg/dL    BUN 46 (H) 7.0 - 18 MG/DL    Creatinine 8.15 (H) 0.6 - 1.3 MG/DL    BUN/Creatinine ratio 6 (L) 12 - 20      GFR est AA 6 (L) >60 ml/min/1.73m2    GFR est non-AA 5 (L) >60 ml/min/1.73m2    Calcium 8.8 8.5 - 10.1 MG/DL   CBC WITH AUTOMATED DIFF    Collection Time: 04/09/22  2:30 AM   Result Value Ref Range    WBC 9.5 4.6 - 13.2 K/uL    RBC 2.48 (L) 4.20 - 5.30 M/uL    HGB 7.9 (L) 12.0 - 16.0 g/dL    HCT 26.3 (L) 35.0 - 45.0 %    .0 (H) 78.0 - 100.0 FL    MCH 31.9 24.0 - 34.0 PG    MCHC 30.0 (L) 31.0 - 37.0 g/dL    RDW 14.8 (H) 11.6 - 14.5 %    PLATELET 213 491 - 980 K/uL    MPV 9.6 9.2 - 11.8 FL    NRBC 0.0 0  WBC    ABSOLUTE NRBC 0.00 0.00 - 0.01 K/uL    NEUTROPHILS 47 40 - 73 %    LYMPHOCYTES 40 21 - 52 %    MONOCYTES 8 3 - 10 %    EOSINOPHILS 5 0 - 5 %    BASOPHILS 0 0 - 2 %    IMMATURE GRANULOCYTES 0 %    ABS. NEUTROPHILS 4.4 1.8 - 8.0 K/UL    ABS. LYMPHOCYTES 3.8 (H) 0.9 - 3.6 K/UL    ABS. MONOCYTES 0.8 0.05 - 1.2 K/UL    ABS. EOSINOPHILS 0.5 (H) 0.0 - 0.4 K/UL    ABS. BASOPHILS 0.0 0.0 - 0.1 K/UL    ABS. IMM.  GRANS. 0.0 K/UL    DF MANUAL      PLATELET COMMENTS ADEQUATE PLATELETS      RBC COMMENTS POLYCHROMASIA  1+        RBC COMMENTS OVALOCYTES  1+       NT-PRO BNP    Collection Time: 04/09/22  2:30 AM   Result Value Ref Range    NT pro-BNP 12,583 (H) 0 - 900 PG/ML   TROPONIN-HIGH SENSITIVITY    Collection Time: 04/09/22  2:30 AM   Result Value Ref Range    Troponin-High Sensitivity 18 0 - 54 ng/L   BLOOD GAS, ARTERIAL POC    Collection Time: 04/09/22  2:52 AM Result Value Ref Range    Device: BIPAP MASK      FIO2 (POC) 40 %    pH (POC) 7.28 (L) 7.35 - 7.45      pCO2 (POC) 36.4 35.0 - 45.0 MMHG    pO2 (POC) 88 80 - 100 MMHG    HCO3 (POC) 17.2 (L) 22 - 26 MMOL/L    sO2 (POC) 95.5 92 - 97 %    Base deficit (POC) 8.7 mmol/L    Mode BILEVEL      Tidal volume 360 ml    Set Rate 12 bpm    PIP (POC) 16      Allens test (POC) Positive      Total resp. rate 556      Site RIGHT RADIAL      Specimen type (POC) ARTERIAL      Performed by Cecil Stark    EKG, 12 LEAD, INITIAL    Collection Time: 04/09/22  2:52 AM   Result Value Ref Range    Ventricular Rate 125 BPM    Atrial Rate 125 BPM    P-R Interval 122 ms    QRS Duration 64 ms    Q-T Interval 322 ms    QTC Calculation (Bezet) 464 ms    Calculated P Axis 66 degrees    Calculated R Axis 36 degrees    Calculated T Axis 87 degrees    Diagnosis       Sinus tachycardia  Minimal voltage criteria for LVH, may be normal variant ( Sokolow-Butler )  Borderline ECG  When compared with ECG of 29-MAR-2022 02:42,  T wave inversion more evident in Lateral leads        Labs Reviewed   METABOLIC PANEL, BASIC - Abnormal; Notable for the following components:       Result Value    CO2 19 (*)     Glucose 191 (*)     BUN 46 (*)     Creatinine 8.15 (*)     BUN/Creatinine ratio 6 (*)     GFR est AA 6 (*)     GFR est non-AA 5 (*)     All other components within normal limits   CBC WITH AUTOMATED DIFF - Abnormal; Notable for the following components:    RBC 2.48 (*)     HGB 7.9 (*)     HCT 26.3 (*)     .0 (*)     MCHC 30.0 (*)     RDW 14.8 (*)     ABS. LYMPHOCYTES 3.8 (*)     ABS.  EOSINOPHILS 0.5 (*)     All other components within normal limits   NT-PRO BNP - Abnormal; Notable for the following components:    NT pro-BNP 12,583 (*)     All other components within normal limits   BLOOD GAS, ARTERIAL POC - Abnormal; Notable for the following components:    pH (POC) 7.28 (*)     HCO3 (POC) 17.2 (*)     All other components within normal limits TROPONIN-HIGH SENSITIVITY       Radiologic Studies -   XR CHEST PORT    (Results Pending)     CT Results  (Last 48 hours)    None        CXR Results  (Last 48 hours)    None          The laboratory results, imaging results, and other diagnostic exams were reviewed in the EMR. Medical Decision Making   I am the first provider for this patient. I reviewed the vital signs, available nursing notes, past medical history, past surgical history, family history and social history. Vital Signs-Reviewed the patient's vital signs. ED EKG interpretation:  Rhythm: sinus tachycardia; and regular . Rate (approx.): 125; Axis: normal; P wave: normal; QRS interval: normal ; ST/T wave: normal; This EKG was interpreted by Olga Torres MD.       Records Reviewed: Personally, on initial evaluation    MDM:   Mariana Gonzalez presents with complaint of dyspnea  DDX includes but is not limited to: MI/ACS, PE, flash pulmonary edema, volume overload, PTX, PNA     Will obtain lab work and imaging for further evaluation of patients complaint. Will continue to monitor and evaluate patient while in the ED. 82-year-old female past medical history of ESRD requiring dialysis. Presents after acute onset shortness of breath. Patient is not a good historian and does not remember when her dialysis sessions are. Per her report, last dialysis session may have been as late as 1 week ago. Record review demonstrates the patient has presented on multiple occasions for hypoxia and hyperkalemia after missing dialysis sessions. Patient initially tachycardic with significant rales throughout her lung fields. Blood pressure noted to be 220s/120s. Patient started on a nitroglycerin drip and BiPAP. Nitroglycerin drip was titrated from 200mcg/minute to off. BiPAP was also titrated to nasal cannula shortly after being treated.     Case discussed with patient's nephrologist for further dialysis given her acute presentation suggesting volume overload. Initial BMP relatively unremarkable. CBC close to patient's baseline. BNP elevated but comparable to prior BNPs. ABG with pH of 7.28, CO2 and O2 appear appropriate. Troponin not significantly elevated    CXR appears better than prior CXRs when patient presented with shortness of breath and fluid overload. However, physical exam demonstrates significant rales and hypertension suggested flash pulmonary edema. Patient quickly responded to nitroglycerin drip and BiPAP. EKG without evidence of an acute ischemic process. Plan is for patient to receive dialysis to address volume overload. Further management and disposition per Dr. Genevieve Burnett once patient completes her dialysis. Despite the plan is for patient to be discharged home after completing her dialysis.       Orders as below:  Orders Placed This Encounter    XR CHEST PORT    BASIC METABOLIC PANEL    CBC WITH AUTOMATED DIFF    PRO-BNP    TROPONIN-HIGH SENSITIVITY    NON-INVASIVE POSITIVE PRESSURE VENTILATION    BLOOD GAS, ARTERIAL POC    EKG, 12 LEAD, INITIAL    nitroglycerin (TRIDIL) 400 mcg/ml infusion    morphine injection 1 mg        Procedures:  Critical Care  Performed by: Marquise Gonzalez MD  Authorized by: Marquise Gonzalez MD     Critical care provider statement:     Critical care time (minutes):  40    Critical care was necessary to treat or prevent imminent or life-threatening deterioration of the following conditions:  Respiratory failure    Critical care was time spent personally by me on the following activities:  Blood draw for specimens, development of treatment plan with patient or surrogate, discussions with consultants, evaluation of patient's response to treatment, examination of patient, obtaining history from patient or surrogate, vascular access procedures, review of old charts, re-evaluation of patient's condition, pulse oximetry, ordering and review of radiographic studies, ordering and review of laboratory studies and ordering and performing treatments and interventions            Diagnosis and Disposition     CLINICAL IMPRESSION:  No diagnosis found. Current Discharge Medication List          Disposition: dialysis    Patient condition at time of disposition: improved        Dragon Disclaimer     Please note that this dictation was completed with [employer], the computer voice recognition software. Quite often unanticipated grammatical, syntax, homophones, and other interpretive errors are inadvertently transcribed by the computer software. Please disregard these errors. Please excuse any errors that have escaped final proofreading.       Sahara Fitch MD

## 2022-04-09 NOTE — ED TRIAGE NOTES
Pt straight back from front door with visible distress c/o SOB and chest pain for past 10-15 min. Dialysis pt M,T,TH,S increased recently due to pt having frequent SOB and fluid overload.

## 2022-04-09 NOTE — ED NOTES
Bedside shift change report given to Chapincito Squires (oncoming nurse) by Ozzie Paredes (offgoing nurse). Report included the following information SBAR, Kardex, ED Summary, Alarm Parameters  and Quality Measures. Dialysis nurse at bedside.   Pt awake in bed in no acute distress with unlabored breathing

## 2022-04-09 NOTE — ED NOTES
Paper scrubs and socks provided. Pt discharge instructions reviewed with patient, patient denies questions and verbalizes understanding. IVs removed and all belongings with patient. Pt alert and oriented, no signs of distress. Pt assisted to wheelchair and transported to ED waiting room. One shoe including in pt belongings and family acknowledge one shoe in pt belongings and one shoe with family.   Pt assisted into family vehicle

## 2022-04-09 NOTE — ED NOTES
Patient seen by me as well.   Seen on arrival hypoxic placed on BiPAP nitro drip now feeling better off BiPAP off nitro drip pending dialysis okay for discharge after dialysis

## 2022-04-11 NOTE — PROGRESS NOTES
Patient's clinical impressions were added to her chart for her last emergency department encounter on 4/9/2022

## 2022-05-17 ENCOUNTER — HOSPITAL ENCOUNTER (EMERGENCY)
Age: 62
Discharge: HOME | End: 2022-05-17
Attending: STUDENT IN AN ORGANIZED HEALTH CARE EDUCATION/TRAINING PROGRAM
Payer: MEDICAID

## 2022-05-17 VITALS
OXYGEN SATURATION: 99 % | SYSTOLIC BLOOD PRESSURE: 180 MMHG | HEIGHT: 61 IN | RESPIRATION RATE: 18 BRPM | BODY MASS INDEX: 21.71 KG/M2 | HEART RATE: 94 BPM | WEIGHT: 115 LBS | TEMPERATURE: 99 F | DIASTOLIC BLOOD PRESSURE: 113 MMHG

## 2022-05-17 DIAGNOSIS — Z99.2 ESRD (END STAGE RENAL DISEASE) ON DIALYSIS (HCC): ICD-10-CM

## 2022-05-17 DIAGNOSIS — R04.0 EPISTAXIS: Primary | ICD-10-CM

## 2022-05-17 DIAGNOSIS — N18.6 ESRD (END STAGE RENAL DISEASE) ON DIALYSIS (HCC): ICD-10-CM

## 2022-05-17 LAB
ANION GAP SERPL CALC-SCNC: 10 MMOL/L (ref 3–18)
BASOPHILS # BLD: 0 K/UL (ref 0–0.1)
BASOPHILS NFR BLD: 1 % (ref 0–2)
BUN SERPL-MCNC: 47 MG/DL (ref 7–18)
BUN/CREAT SERPL: 7 (ref 12–20)
CALCIUM SERPL-MCNC: 8.3 MG/DL (ref 8.5–10.1)
CHLORIDE SERPL-SCNC: 104 MMOL/L (ref 100–111)
CO2 SERPL-SCNC: 26 MMOL/L (ref 21–32)
CREAT SERPL-MCNC: 6.4 MG/DL (ref 0.6–1.3)
DIFFERENTIAL METHOD BLD: ABNORMAL
EOSINOPHIL # BLD: 0.1 K/UL (ref 0–0.4)
EOSINOPHIL NFR BLD: 3 % (ref 0–5)
ERYTHROCYTE [DISTWIDTH] IN BLOOD BY AUTOMATED COUNT: 15 % (ref 11.6–14.5)
GLUCOSE SERPL-MCNC: 93 MG/DL (ref 74–99)
HCT VFR BLD AUTO: 23.8 % (ref 35–45)
HGB BLD-MCNC: 7.2 G/DL (ref 12–16)
IMM GRANULOCYTES # BLD AUTO: 0 K/UL (ref 0–0.04)
IMM GRANULOCYTES NFR BLD AUTO: 1 % (ref 0–0.5)
LYMPHOCYTES # BLD: 1.4 K/UL (ref 0.9–3.6)
LYMPHOCYTES NFR BLD: 37 % (ref 21–52)
MCH RBC QN AUTO: 27.6 PG (ref 24–34)
MCHC RBC AUTO-ENTMCNC: 30.3 G/DL (ref 31–37)
MCV RBC AUTO: 91.2 FL (ref 78–100)
MONOCYTES # BLD: 0.5 K/UL (ref 0.05–1.2)
MONOCYTES NFR BLD: 14 % (ref 3–10)
NEUTS SEG # BLD: 1.7 K/UL (ref 1.8–8)
NEUTS SEG NFR BLD: 45 % (ref 40–73)
NRBC # BLD: 0 K/UL (ref 0–0.01)
NRBC BLD-RTO: 0 PER 100 WBC
PLATELET # BLD AUTO: 125 K/UL (ref 135–420)
PMV BLD AUTO: 11.1 FL (ref 9.2–11.8)
POTASSIUM SERPL-SCNC: 3.8 MMOL/L (ref 3.5–5.5)
RBC # BLD AUTO: 2.61 M/UL (ref 4.2–5.3)
SODIUM SERPL-SCNC: 140 MMOL/L (ref 136–145)
WBC # BLD AUTO: 3.9 K/UL (ref 4.6–13.2)

## 2022-05-17 PROCEDURE — 99283 EMERGENCY DEPT VISIT LOW MDM: CPT

## 2022-05-17 PROCEDURE — 85025 COMPLETE CBC W/AUTO DIFF WBC: CPT

## 2022-05-17 PROCEDURE — 74011250637 HC RX REV CODE- 250/637: Performed by: PHYSICIAN ASSISTANT

## 2022-05-17 PROCEDURE — 80048 BASIC METABOLIC PNL TOTAL CA: CPT

## 2022-05-17 RX ORDER — OXYMETAZOLINE HCL 0.05 %
2 SPRAY, NON-AEROSOL (ML) NASAL
Status: COMPLETED | OUTPATIENT
Start: 2022-05-17 | End: 2022-05-17

## 2022-05-17 RX ADMIN — OXYMETAZOLINE HCL 2 SPRAY: 0.05 SPRAY NASAL at 13:33

## 2022-05-17 NOTE — ED PROVIDER NOTES
EMERGENCY DEPARTMENT HISTORY AND PHYSICAL EXAM    1:49 PM    Date: 5/17/2022  Patient Name: Chidi Steel    History of Presenting Illness     Chief Complaint   Patient presents with    Epistaxis       History Provided By: Patient  Location/Duration/Severity/Modifying factors   HPI   Chidi Steel is a 63 y.o. female with past no history of HIV/AIDS, polysubstance abuse, ESRD on HD, schizophrenia presenting for epistaxis. Patient states that just prior to going to dialysis today she ended up having a nosebleed. He says that it was not traumatic, denies any cocaine use that led to this. Pressure was applied by EMS and she arrived without any active epistaxis but does have some dried blood on her shirt. She denies feeling lightheaded or dizzy, not on anticoagulation. She reports no fevers or chills, abdominal pain, nausea or vomiting. She did not receive dialysis today but last had a full run on Saturday. She tells me that she does not have a regular nephrologist that she sees somehow because she recently moved from [facility], and cannot remember which dialysis center she was going to. She denies other medical complaints today. History and ROS are limited by her overlying psychiatric illness    PCP: Unknown, Provider, MD    Current Outpatient Medications   Medication Sig Dispense Refill    QUEtiapine (SEROquel) 25 mg tablet Take 25 mg by mouth nightly.  calcium acetate,phosphat bind, (PHOSLO) 667 mg cap Take 2 Capsules by mouth three (3) times daily (with meals).  crlciyiek-octfjypk-smxbaww ala (BIKTARVY) tab tablet Take 1 Tablet by mouth daily.  amLODIPine (NORVASC) 10 mg tablet Take 1 Tablet by mouth daily. 30 Tablet 0    azithromycin (ZITHROMAX) 600 mg tablet Take 2 Tablets by mouth every seven (7) days. 30 Tablet 0    carvediloL (COREG) 6.25 mg tablet Take 1 Tablet by mouth two (2) times daily (with meals). 60 Tablet 0    losartan (COZAAR) 100 mg tablet Take 1 Tablet by mouth daily. 30 Tablet 0       Past History     Past Medical History:  Past Medical History:   Diagnosis Date    Acute kidney injury ([facility]     AIDS (acquired immune deficiency syndrome) ([facility]     Anemia     Esophageal candidiasis (HCC)     ESRD (end stage renal disease) (HCC)     HCV (hepatitis C virus)     Hepatitis B     Hepatitis C     HIV (human immunodeficiency virus infection) ([facility]     HIV (human immunodeficiency virus infection) ([facility]     Late latent syphilis     Pulmonary TB     Schizoaffective disorder ([facility]     Substance abuse ([facility]        Past Surgical History:  Past Surgical History:   Procedure Laterality Date    HX GYN      HX NEPHRECTOMY      left kidney removed at age 17   [de-identified] NEPHRECTOMY         Family History:  No family history on file. Social History:  Social History     Tobacco Use    Smoking status: Not on file    Smokeless tobacco: Not on file   Substance Use Topics    Alcohol use: Not on file    Drug use: Not on file       Allergies: Allergies   Allergen Reactions    Ceftriaxone Itching     Rxn in ER recorded 2007      Aspirin Hives    Pcn [Penicillins] Hives       I reviewed and confirmed the above information with patient and updated as necessary. Review of Systems     Review of Systems   Constitutional: Negative for diaphoresis and fever. HENT: Positive for nosebleeds. Negative for ear pain and sore throat. Eyes:        No acute change in vision   Respiratory: Negative for cough and shortness of breath. Cardiovascular: Negative for chest pain and leg swelling. Gastrointestinal: Negative for abdominal pain and vomiting. Genitourinary: Negative for dysuria. Musculoskeletal: Negative for neck pain. Skin: Negative for wound. Neurological: Negative for weakness and headaches.        Physical Exam     Visit Vitals  BP (!) 180/113   Pulse 94   Temp 99 °F (37.2 °C)   Resp 18   Ht 5' 1\" (1.549 m)   Wt 52.2 kg (115 lb)   SpO2 99%   BMI 21.73 kg/m²       Physical Exam  Vitals and nursing note reviewed. Constitutional:       Comments: Adult female resting comfortably, nondistressed   HENT:      Nose:      Comments: Evidence of dried blood in the right naris, no active epistaxis     Mouth/Throat:      Mouth: Mucous membranes are moist.   Eyes:      Pupils: Pupils are equal, round, and reactive to light. Cardiovascular:      Rate and Rhythm: Normal rate and regular rhythm. Pulses: Normal pulses. Comments: Fistula with palpable thrill  Pulmonary:      Effort: Pulmonary effort is normal.      Breath sounds: Normal breath sounds. Abdominal:      Palpations: Abdomen is soft. Tenderness: There is no abdominal tenderness. Musculoskeletal:         General: No signs of injury. Normal range of motion. Cervical back: Normal range of motion. Skin:     General: Skin is warm. Neurological:      General: No focal deficit present. Mental Status: She is alert and oriented to person, place, and time. Mental status is at baseline. Diagnostic Study Results     Labs -  Recent Results (from the past 24 hour(s))   CBC WITH AUTOMATED DIFF    Collection Time: 05/17/22 12:30 PM   Result Value Ref Range    WBC 3.9 (L) 4.6 - 13.2 K/uL    RBC 2.61 (L) 4.20 - 5.30 M/uL    HGB 7.2 (L) 12.0 - 16.0 g/dL    HCT 23.8 (L) 35.0 - 45.0 %    MCV 91.2 78.0 - 100.0 FL    MCH 27.6 24.0 - 34.0 PG    MCHC 30.3 (L) 31.0 - 37.0 g/dL    RDW 15.0 (H) 11.6 - 14.5 %    PLATELET 537 (L) 277 - 420 K/uL    MPV 11.1 9.2 - 11.8 FL    NRBC 0.0 0  WBC    ABSOLUTE NRBC 0.00 0.00 - 0.01 K/uL    NEUTROPHILS 45 40 - 73 %    LYMPHOCYTES 37 21 - 52 %    MONOCYTES 14 (H) 3 - 10 %    EOSINOPHILS 3 0 - 5 %    BASOPHILS 1 0 - 2 %    IMMATURE GRANULOCYTES 1 (H) 0.0 - 0.5 %    ABS. NEUTROPHILS 1.7 (L) 1.8 - 8.0 K/UL    ABS. LYMPHOCYTES 1.4 0.9 - 3.6 K/UL    ABS. MONOCYTES 0.5 0.05 - 1.2 K/UL    ABS. EOSINOPHILS 0.1 0.0 - 0.4 K/UL    ABS. BASOPHILS 0.0 0.0 - 0.1 K/UL    ABS. IMM.  GRANS. 0.0 0.00 - 0.04 K/UL    DF AUTOMATED     METABOLIC PANEL, BASIC    Collection Time: 05/17/22 12:30 PM   Result Value Ref Range    Sodium 140 136 - 145 mmol/L    Potassium 3.8 3.5 - 5.5 mmol/L    Chloride 104 100 - 111 mmol/L    CO2 26 21 - 32 mmol/L    Anion gap 10 3.0 - 18 mmol/L    Glucose 93 74 - 99 mg/dL    BUN 47 (H) 7.0 - 18 MG/DL    Creatinine 6.40 (H) 0.6 - 1.3 MG/DL    BUN/Creatinine ratio 7 (L) 12 - 20      GFR est AA 8 (L) >60 ml/min/1.73m2    GFR est non-AA 7 (L) >60 ml/min/1.73m2    Calcium 8.3 (L) 8.5 - 10.1 MG/DL         Radiologic Studies -   No orders to display           Medical Decision Making   I am the first provider for this patient. I reviewed the vital signs, available nursing notes, past medical history, past surgical history, family history and social history. Vital Signs-Reviewed the patient's vital signs. Records Reviewed: Nursing Notes and Old Medical Records (Time of Review: 1:49 PM)    Provider Notes (Medical Decision Making):   MDM  24-year-old female here for epistaxis consider digital trauma, cocaine abuse, anemia of blood loss, others    ED Course: Progress Notes, Reevaluation, and Consults:  Patient arrives afebrile and hemodynamically normal other than hypertension, resting comfortably, no active nosebleed    Will screen labs and discussed with nephrology    CBC shows no leukocytosis, chronic anemia, today is 7.2, similar to her priors, not meeting transfusion criteria, doubt acute blood loss anemia  CMP without any metabolic derangements    Patient monitored for 3 hours in the emergency department, no active nosebleed is noted. Discussed with nephrology, Dr. Puma Diaz presents with the patient does not need emergent dialysis, she is actually dialyzed yesterday, safe for returning to her normal schedule dialysis. Given a bottle of Afrin should she need it. Discharged home in stable condition. Procedures  Diagnosis     Clinical Impression:   1. Epistaxis    2.  ESRD (end stage renal disease) on dialysis [facility])      Disposition: Home    Follow-up Information     Follow up With Specialties Details Why [address]    [facility] EMERGENCY DEPT Emergency Medicine  As needed, If symptoms worsen [address]  [phone]           Patient's Medications   Start Taking    No medications on file   Continue Taking    AMLODIPINE (NORVASC) 10 MG TABLET    Take 1 Tablet by mouth daily. AZITHROMYCIN (ZITHROMAX) 600 MG TABLET    Take 2 Tablets by mouth every seven (7) days. [id number] ALA (BIKTARVY) TAB TABLET    Take 1 Tablet by mouth daily. CALCIUM ACETATE,PHOSPHAT BIND, (PHOSLO) 667 MG CAP    Take 2 Capsules by mouth three (3) times daily (with meals). CARVEDILOL (COREG) 6.25 MG TABLET    Take 1 Tablet by mouth two (2) times daily (with meals). LOSARTAN (COZAAR) 100 MG TABLET    Take 1 Tablet by mouth daily. QUETIAPINE (SEROQUEL) 25 MG TABLET    Take 25 mg by mouth nightly. These Medications have changed    No medications on file   Stop Taking    No medications on file       Latosha Dang MD   Emergency Medicine   May 17, 2022, 1:49 PM     This note is dictated utilizing Dragon voice recognition software. Unfortunately this leads to occasional typographical errors using the voice recognition. I apologize in advance if the situation occurs. If questions occur please do not hesitate to contact me directly.     Marko Keating MD

## 2022-05-17 NOTE — DISCHARGE INSTRUCTIONS
Please return to the ER with any new or worsening symptoms or any other concerns. Please return to the Emergency Department if you develop a fever, chills, cannot eat or drink due to nausea or vomiting, or if any of your symptoms worsen. Also, It is extremely important that you follow-up with a primary care physician and if you do not have one currently use the contact information provided to obtain an appointment. If none was provided please call the number on the back of your insurance card to locate a Primary care doctor. Many offices have \"cancellation lists\" that you can ask to be placed on; should a patient with an earlier appointment cancel you will be notified to take their place. Please return to the Emergency Room immediately if your symptoms worsen. Please carefully read all discharge instructions    [email or web address]    What are GoodRx coupons? GoodRx coupons will help you pay less than the cash price for your prescription. [employer] Jayce free to use and are accepted at virtually every [place] pharmacy. Your pharmacist will know how to enter the codes on the coupon to pull up the lowest discount available.

## 2022-05-17 NOTE — ED NOTES
Epistaxis x hours. Was at dialysis, did not start dialysis today. Completed dialysis Saturday as scheduled. I performed a brief evaluation, including history and physical, of the patient here in triage and I have determined that pt will need further treatment and evaluation from the main side ER physician. I have placed initial orders to help in expediting patients care.      May 17, 2022 at 12:14 PM - DARNELL Figueredo

## 2022-10-20 NOTE — DIALYSIS
JASMYNE        ACUTE HEMODIALYSIS FLOW SHEET      HEMODIALYSIS ORDERS: Physician: Willy Aj     Dialyzer: revaclear   Duration: 3 hr  BFR: 350   DFR: 600   Dialysate:  Temp 36-37*C  K+   2    Ca+  2.5 Na 138 Bicarb 35   Weight:   kg    Patient Chart []     Unable to Obtain [x]     Dry weight/UF Goal: 3000   Access: right chest TDC    Heparin:  [x]  Bolus 1000 Units   +     [x] Hourly 500 Units        Catheter locking solution: heparin    Pre BP:   159/91    Pulse:     95       Respirations: 20  Temperature:   98.0   Labs: Pre        Post:         [x] N/A   Additional Orders(medications, blood products, hypotension management):       [x] N/A     [x] Jasmyne Consent Verified     CATHETER ACCESS: []N/A   [x]Right chest   []Left   []IJ     []Fem   []transhepatic   [] First use X-ray verified     [x]Permanent                [] Temporary   [x]No S/S infection  []Redness  []Drainage []Cultured []Swelling []Pain   [x]Medical Aseptic Prep Utilized   [x]Dressing Changed  [x] Biopatch  Date: 3/15/22       []Clotted   [x]Patent   Flows: [x]Good  []Poor  []Reversed   If access problem,  notified: []Yes    [x]N/A           GRAFT/FISTULA ACCESS:  [x]N/A                             GENERAL ASSESSMENT:      LUNGS:  Rate 20 SaO2 %        [] N/A    [x] Clear  [] Coarse  [] Crackles  [] Wheezing        [] Diminished     Location : []RLL   []LLL    []RUL  []PAYAL     Cough: []Productive  [x]Dry  []N/A   Respirations:  [x]Easy  []Labored     Therapy:   []RA  [x]NC 6 l/min    Mask: []NRB []Venti       O2%                  []Ventilator  []Intubated  [] Trach  [] BiPaP     CARDIAC: [x]Regular      [] Irregular   [] Pericardial Rub  [] JVD        []  Monitored  [] Bedside  [] Remotely monitored [x] N/A      EDEMA: [] None   [x]Generalized  [] Pitting [] 1    [] 2    [] 3    [] 4                 [] Facial  [] Pedal  []  UE  [] LE     SKIN:   [x] Warm   [] Hot     [] Cold   [x] Dry     [] Pale   [] Diaphoretic                  [] Flushed  [] Jaundiced  [] Cyanotic  [] Rash  [] Weeping     LOC:    [x] Alert      [x]Oriented:    [x] Person     [] Place  []Time               [x] Confused  [] Lethargic  [] Medicated  [] Non-responsive     GI / ABDOMEN:  [] Flat    [] Distended    [x] Soft    [] Firm   []  Obese                             [] Diarrhea  [x] Bowel Sounds  [] Nausea  [] Vomiting       / URINE ASSESSMENT:[] Voiding   [x] Oliguria  [] Anuria   []  Aragon     [] Incontinent    []  Incontinent Brief      []  Bathroom Privileges       PAIN: [x] 0 []1  []2   []3   []4   []5   []6   []7   []8   []9   []10              Scale 0-10  Action/Follow Up:      MOBILITY:  [] Amb    [] Amb/Assist    [] Bed    [] Wheelchair  [x] Stretcher      All Vitals and Treatment Details on Attached [address]: [facility]          Room # ER06/06      [] 1st Time Acute  [] Stat  [x] Routine  [] Urgent     [x] Acute Room  []  Bedside  [] ICU/CCU  [] ER   Isolation Precautions:   There are currently no Active Isolations      Special Considerations:         [] Blood Consent Verified [x]N/A      ALLERGIES:   Allergies   Allergen Reactions    Ceftriaxone Itching     Rxn in ER recorded 2007      Aspirin Hives    Pcn [Penicillins] Hives               Code Status:Prior        Hepatitis Status:                      Lab Results   Component Value Date/Time    Hepatitis B surface Ag 0.15 02/14/2022 03:20 PM    Hepatitis B surface Ab 115.39 02/14/2022 03:20 PM    Hep B Core Ab, IgM Negative 02/17/2022 08:22 PM    Hep B Core Ab, total Positive (A) 02/17/2022 08:22 PM                     Current Labs:   Lab Results   Component Value Date/Time    Sodium 137 03/15/2022 01:05 AM    Potassium 4.7 03/15/2022 01:05 AM    Chloride 108 03/15/2022 01:05 AM    CO2 20 (L) 03/15/2022 01:05 AM    Anion gap 9 03/15/2022 01:05 AM    Glucose 96 03/15/2022 01:05 AM    BUN 85 (H) 03/15/2022 01:05 AM    Creatinine 8.47 (H) 03/15/2022 01:05 AM    BUN/Creatinine ratio 10 (L) 03/15/2022 01:05 AM GFR est AA 6 (L) 03/15/2022 01:05 AM    GFR est non-AA 5 (L) 03/15/2022 01:05 AM    Calcium 8.8 03/15/2022 01:05 AM      Lab Results   Component Value Date/Time    WBC 7.7 03/15/2022 01:05 AM    HGB 7.1 (L) 03/15/2022 01:05 AM    HCT 22.8 (L) 03/15/2022 01:05 AM    PLATELET 991 15/35/5052 01:05 AM    MCV 97.4 03/15/2022 01:05 AM                                                                                     DIET:   None       PRIMARY NURSE REPORT: First initial/Last name/Title      Pre Dialysis: YU Molina RN     Time: 6535      EDUCATION:    [x] Patient [] Other         Knowledge Basis: []None [x]Minimal [] Substantial   Barriers to learning: AMs   [] Access Care     [] S&S of infection     [] Fluid Management     []K+     [x]Procedural    []Albumin     [] Medications     [] Tx Options     [] Transplant     [] Diet     [] Other   Teaching Tools:  [x] Explain  [x] Demo  [] Handouts [] Video  Patient response:  [x] Verbalized understanding  [] Teach back  [] Return demonstration [] Requires follow up   Inappropriate due to:             [x]Time Out/Safety Check  [x]Extracorporeal Circuit Tested for integrity       [address] - Before each treatment:       Machine Number:                   [facility]                                                                    [x] Unit Machine # 8 with centralized RO                                                                     Alarm Test:  Pass time 5181               [x] RO/Machine Log Complete      Temp   36             Dialysate: pH  7.6 Conductivity: Meter   13.9     HD Machine   13.9                  TCD: 13.7  Dialyzer Lot # [id number]            Blood Tubing Lot # [id number]          Saline Lot #  [id number]     CHLORINE TESTING-Before each treatment and every 4 hours    Total Chlorine: [x] less than 0.1 ppm  Time: 0900 4 Hr/2nd Check Time: 1300   (if greater than 0.1 ppm from Primary then every 30 minutes from Secondary) TREATMENT INITIATION - with Dialysis Precautions:   [x] All Connections Secured                 [x] Saline Line Double Clamped   [x] Venous Parameters Set                  [x] Arterial Parameters Set    [x] Prime Given 250ml                          [x]Air Foam Detector Engaged      Treatment Initiation Note:  Pt arrived to HD unit from ED via stretcher. A&O to self only; in NAD with O2 @ 6 LPM NC. Right chest TDC assessed with no s/s complications. HD initiated without difficulty. Heparin bolus administered per MD order. During Treatment Notes:  7118 Face and access in view with connections secure. 1100 Face and access in view with connections secure. 1115 Face and access in view with connections secure. 1130 Face and access in view with connections secure. 1145 Face and access in view with connections secure. 1200 Face and access in view with connections secure. 1215 Face and access in view with connections secure. 1230 Face and access in view with connections secure. 1245 Face and access in view with connections secure. 1250  Pt yelling out in pain, stating her legs hurt. UF turned off. Dr. Catalino Rubin made aware. Received order to reduce UF goal to 500 mL/hour. 1300 Face and access in view with connections secure. 1315 Face and access in view with connections secure. 1330 Face and access in view with connections secure. 1344 Treatment complete.             Medication Dose Volume Route Time [facility] name Title   heparin 1000 units 1 ml dialysis 1042 P Ed [place]   hectorol 2 mcg 1 ml dialysis 1225 P Ed [place]                   Post Assessment:   Dialyzer Cleared: [] Good [x] Fair  [] Poor  Blood processed:  61.6 L  UF Removed  3500 mL  POst BP:   189/87       Pulse: 76        Respirations: 18  Temperature: 98.2 Lungs:     [x] Clear      [] Course         [] Crackles    [] Wheezing         [] Diminished   Post Tx Vascular Access:     N/A Cardiac:   [x] Regular   [] Irregular   [] Monitor  [x] N/A           Catheter:   Locking solution: Heparin 1:1000   Art. 1.9  Vic. 2.0     Skin:   Pain:   [x] Warm  [x] Dry [] Diaphoretic    [] Flushed    [] Pale [] Cyanotic [x]0  []1  []2   []3  []4   []5   []6   []7   []8   []9   []10     Post Treatment Note:  Pt tolerated treatment well. Net 3 L UF removed. POST TREATMENT PRIMARY NURSE HANDOFF REPORT:     First initial/Last name/Title         Post Dialysis: YU Manzano RN     Time:  1350     Abbreviations: AVG-arterial venous graft, AVF-arterial venous fistula, IJ-Internal Jugular, Subcl-Subclavian, Fem-Femoral, Tx-treatment, AP/HR-apical heart rate, DFR-dialysate flow rate, BFR-blood flow rate, AP-arterial pressure, -venous pressure, UF-ultrafiltrate, TMP-transmembrane pressure, Vic-Venous, Art-Arterial, RO-Reverse Osmosis Patient seen and examined this afternoon    85 year old female, from home, ambulates with cane, PMH HTN, HLD, DM, prior brain surgery (benign meningioma), prior cerebellar CVA, presents to the ED after fall at home. Pt states that 2 nights ago, she was walking to her bedroom and felt as if the distance was further. She states it was dark and she fell. Pt was not able to get up on her own. She states she felt vertigo, "the room spinning" after she fell and is not sure if she lost consciousness or not. She states she has 6/10 pain in her right shoulder. Her neighbor called EMS the next day when she checked in on her. Pt found to be COVID+ in the ED. Also with Right shoulder fracture.   Asper patient she was away to [facility] in August to September after Son passed away. She just returned at the end of September. She reported also stop taking Ozempic last week after she had Nausea and stomach upset but has been tolerating well for last few months.      No fever. No urinary symptoms.   denies fever, chills, SOB, palpitations, chest pain, nausea, vomiting, diarrhea, constipation, dizziness    Vital Signs Last 24 Hrs  T(C): 36.7 (20 Oct 2022 18:04), Max: 36.8 (19 Oct 2022 20:55)  T(F): 98.1 (20 Oct 2022 18:04), Max: 98.2 (19 Oct 2022 20:55)  HR: 83 (20 Oct 2022 18:04) (67 - 92)  BP: 123/62 (20 Oct 2022 18:04) (58/42 - 123/62)  RR: 17 (20 Oct 2022 18:04) (17 - 18)  SpO2: 96% (20 Oct 2022 18:04) (93% - 98%)    Parameters below as of 20 Oct 2022 18:04  Patient On (Oxygen Delivery Method): room air    P/E:  elderly female, comfortable at rest , NAD  Psych: AAO x3  Neuro: No gross focal deficits; Power and sensation intact  CVS: S1S2 present, regular, no edema  Resp: BLAE+, No wheeze or Rhonchi  GI: Soft, BS+, Non tender, non distended  Extr: No  calf tenderness B/L Lower extremities  Right shoulder swelling, tenderness and limited ROM due to fracture  Skin: Warm and moist without any rashes    Labs:                                 12.0   15.06 )-----------( 297      ( 19 Oct 2022 10:12 )             36.7   10-19    138  |  103  |  20<H>  ----------------------------<  144<H>  3.5   |  25  |  0.68    Ca    9.2      19 Oct 2022 10:12  Phos  2.6     10-19  Mg     2.1     10-19    TPro  7.3  /  Alb  3.2<L>  /  TBili  0.6  /  DBili  x   /  AST  20  /  ALT  17  /  AlkPhos  66  10-18      ACC: [id number] EXAM:  CT BRAIN                        ACC: [id number] EXAM:  CT CERVICAL SPINE                        PROCEDURE DATE:  10/19/2022    IMPRESSION:  1. No acute intracranial CT abnormality.  2. No evidence of acute cervical spine fracture or traumatic malalignment, within the limitations of motion artifact.  3. Hemorrhage in the right axillary region, secondary to displaced proximal humeral fracture demonstrated on the shoulder radiographs.    Xray Chest 1 View AP/PA (10.19.22 @ 04:48)  IMPRESSION: No acute infiltrate. Displaced angulated right humeral neck fracture with overlap.    D/D:  s/p Mechanical fall likely due to dehydration with volume depletion due to   COVID 19 infection plus age related physical debility likely  Right shoulder humeral neck fracture s/p fall  Type 2 DM  HTN controlled    Plan:   Continue IV Fluids x 24 hrs more; eating better  Orthopedic consult appreciated;  conservative mgmt; right arm sling in place  Pain control with Tylenol q 8 hrs x 2-3 days routine and Lidocaine patch; Use Tramadol PRN for breakthrough severe pain  Continuer Metoprolol ER 12.5 mg daily  PT eval; Feed with assistance  Discussed with patient potential need for IRIS as she lives alone; Patient inagreement if indicated  ISS for now; I have d/w Patient that her GI symptoms last week for which she stopped Ozempic is likely related to underlying COVID she likely contracted last couple of weeks rather than SE of Ozempic which she tolerated well past few months.   Will resume Ozempic as outpatient; May resume Metformin AM  DVT ppx with Lovenox    Discussed with patient at length and reviewed findings and plan of care at length as above; verbalized understanding and agree with the plan  Discussed with PGY1 HAYDEE Still Patient seen and examined this afternoon    85 year old female, from home, ambulates with cane, PMH HTN, HLD, DM, prior brain surgery (benign meningioma), prior cerebellar CVA, presents to the ED after fall at home. Pt states that 2 nights ago, she was walking to her bedroom and felt as if the distance was further. She states it was dark and she fell. Pt was not able to get up on her own. She states she felt vertigo, "the room spinning" after she fell and is not sure if she lost consciousness or not. She states she has 6/10 pain in her right shoulder. Her neighbor called EMS the next day when she checked in on her. Pt found to be COVID+ in the ED. Also with Right shoulder fracture.   Asper patient she was away to [facility] in August to September after Son passed away. She just returned at the end of September. She reported also stop taking Ozempic last week after she had Nausea and stomach upset but has been tolerating well for last few months.      No fever. No urinary symptoms.   denies fever, chills, SOB, palpitations, chest pain, nausea, vomiting, diarrhea, constipation, dizziness    Vital Signs Last 24 Hrs  T(C): 36.7 (20 Oct 2022 18:04), Max: 36.8 (19 Oct 2022 20:55)  T(F): 98.1 (20 Oct 2022 18:04), Max: 98.2 (19 Oct 2022 20:55)  HR: 83 (20 Oct 2022 18:04) (67 - 92)  BP: 123/62 (20 Oct 2022 18:04) (58/42 - 123/62)  RR: 17 (20 Oct 2022 18:04) (17 - 18)  SpO2: 96% (20 Oct 2022 18:04) (93% - 98%)    Parameters below as of 20 Oct 2022 18:04  Patient On (Oxygen Delivery Method): room air    P/E:  elderly female, comfortable at rest , NAD  Psych: AAO x3  Neuro: No gross focal deficits; Power and sensation intact  CVS: S1S2 present, regular, no edema  Resp: BLAE+, No wheeze or Rhonchi  GI: Soft, BS+, Non tender, non distended  Extr: No  calf tenderness B/L Lower extremities  Right shoulder swelling, tenderness and limited ROM due to fracture  Skin: Warm and moist without any rashes    Labs:     no new today                            12.0   15.06 )-----------( 297      ( 19 Oct 2022 10:12 )             36.7   10-19    138  |  103  |  20<H>  ----------------------------<  144<H>  3.5   |  25  |  0.68    Ca    9.2      19 Oct 2022 10:12  Phos  2.6     10-19  Mg     2.1     10-19    TPro  7.3  /  Alb  3.2<L>  /  TBili  0.6  /  DBili  x   /  AST  20  /  ALT  17  /  AlkPhos  66  10-18      ACC: [id number] EXAM:  CT BRAIN                        ACC: [id number] EXAM:  CT CERVICAL SPINE                        PROCEDURE DATE:  10/19/2022    IMPRESSION:  1. No acute intracranial CT abnormality.  2. No evidence of acute cervical spine fracture or traumatic malalignment, within the limitations of motion artifact.  3. Hemorrhage in the right axillary region, secondary to displaced proximal humeral fracture demonstrated on the shoulder radiographs.    Xray Chest 1 View AP/PA (10.19.22 @ 04:48)  IMPRESSION: No acute infiltrate. Displaced angulated right humeral neck fracture with overlap.    D/D:  s/p Mechanical fall likely due to dehydration with volume depletion due to   COVID 19 infection plus age related physical debility likely  Right shoulder humeral neck fracture s/p fall  Type 2 DM  HTN controlled    Plan:   Continue IV Fluids x 24 hrs more; eating better  Orthopedic consult appreciated;  conservative mgmt; right arm sling in place  Pain control with Tylenol q 8 hrs x 2-3 days routine and Lidocaine patch; Use Tramadol PRN for breakthrough severe pain  Continuer Metoprolol ER 12.5 mg daily  PT eval; Feed with assistance  Discussed with patient potential need for IRIS as she lives alone; Patient in agreement if indicated; await PT   ISS for now; I have d/w Patient that her GI symptoms last week for which she stopped Ozempic is likely related to underlying COVID she likely contracted last couple of weeks rather than SE of Ozempic which she tolerated well past few months.   Will resume Ozempic as outpatient; May resume Metformin AM if suigars elevated persistently above 150; still 120 to 150 range.   DVT ppx with Lovenox will increase to BID as D-dimer was elevated >1000  Repeat inflammatory markers in AM    Discussed with patient at length and reviewed findings and plan of care at length as above; verbalized understanding and agree with the plan  Discussed with PGY1 HAYDEE Still Patient seen and examined this afternoon    85 year old female, from home, ambulates with cane, PMH HTN, HLD, DM, prior brain surgery (benign meningioma), prior cerebellar CVA, presents to the ED after fall at home. Pt states that 2 nights ago, she was walking to her bedroom it was dark and she fell. Pt was not able to get up on her own. She states she felt vertigo, "the room spinning" after she fell and is not sure if she lost consciousness or not. She states she has 6/10 pain in her right shoulder. Her neighbor called EMS the next day when she checked in on her. Pt found to be COVID+ in the ED. Also with Right shoulder fracture.     As per patient she was away to [facility] in August to September after Son passed away. She just returned at the end of September. She is visited by her grandkids usually. Marcia is NOK. She reported also stop taking Ozempic last week after she had Nausea and stomach upset but has been tolerating well for last few months.    Patient doing much better today; appear more hydrated; NAD; not needing supplemental oxygen. No fever. No urinary symptoms.   denies SOB, palpitations, chest pain, nausea, vomiting, diarrhea, constipation, dizziness    Vital Signs Last 24 Hrs  T(C): 36.7 (20 Oct 2022 18:04), Max: 36.8 (19 Oct 2022 20:55)  T(F): 98.1 (20 Oct 2022 18:04), Max: 98.2 (19 Oct 2022 20:55)  HR: 83 (20 Oct 2022 18:04) (67 - 92)  BP: 123/62 (20 Oct 2022 18:04) (58/42 - 123/62)  RR: 17 (20 Oct 2022 18:04) (17 - 18)  SpO2: 96% (20 Oct 2022 18:04) (93% - 98%)  room air    P/E:  elderly female, comfortable at rest , NAD  Psych: AAO x3  Neuro: No gross focal deficits; Power and sensation intact  CVS: S1S2 present, regular, no edema  Resp: BLAE+, No wheeze or Rhonchi  GI: Soft, BS+, Non tender, non distended  Extr: No  calf tenderness B/L Lower extremities  Right shoulder swelling, tenderness and limited ROM due to fracture; right arm sling in place  Skin: Warm and moist without any rashes    Labs:     no new today (repeat AM)                        12.0   15.06 )-----------( 297      ( 19 Oct 2022 10:12 )             36.7   10-19    138  |  103  |  20<H>  ----------------------------<  144<H>  3.5   |  25  |  0.68  Ca    9.2      19 Oct 2022 10:12  Phos  2.6     10-19  Mg     2.1     10-19    TPro  7.3  /  Alb  3.2<L>  /  TBili  0.6  /  DBili  x   /  AST  20  /  ALT  17  /  AlkPhos  66  10-18      ACC: [id number] EXAM:  CT BRAIN                        ACC: [id number] EXAM:  CT CERVICAL SPINE                        PROCEDURE DATE:  10/19/2022    IMPRESSION:  1. No acute intracranial CT abnormality.  2. No evidence of acute cervical spine fracture or traumatic malalignment, within the limitations of motion artifact.  3. Hemorrhage in the right axillary region, secondary to displaced proximal humeral fracture demonstrated on the shoulder radiographs.    Xray Chest 1 View AP/PA (10.19.22 @ 04:48)  IMPRESSION: No acute infiltrate. Displaced angulated right humeral neck fracture with overlap.    D/D:  s/p Mechanical fall likely due to dehydration with volume depletion due to   COVID 19 infection plus age related physical debility likely  Right shoulder humeral neck fracture s/p fall conservative mgmt as per Ortho  Type 2 DM controlled  HTN controlled    Plan:   Continue IV Fluids x 24 hrs more; eating better  Orthopedic consult appreciated;  conservative mgmt; right arm sling in place  Pain control with Tylenol q 8 hrs x 2-3 days routine and Lidocaine patch; Use Tramadol PRN for breakthrough severe pain  Continuer Metoprolol ER 12.5 mg daily  PT eval; Feed with assistance  Discussed with patient potential need for IRIS as she lives alone; Patient in agreement if indicated; await PT   ISS for now; I have d/w Patient that her GI symptoms last week for which she stopped Ozempic is likely related to underlying COVID she likely contracted last couple of weeks rather than SE of Ozempic which she tolerated well past few months.   Will resume Ozempic as outpatient; May resume Metformin AM if sugars elevated persistently above 150; still 120 to 150 range.   DVT ppx with Lovenox will increase to BID as D-dimer was elevated >1000  Repeat inflammatory markers in AM (ordered)    Discussed with patient at length and reviewed findings and plan of care at length as above; verbalized understanding and agree with the plan; D/C Plan 1-2 days based on PT recs.  Discussed with ACP Obdulio Patient seen and examined this afternoon    85 year old female, from home, ambulates with cane, PMH HTN, HLD, DM, prior brain surgery (benign meningioma), prior cerebellar CVA, presents to the ED after fall at home. Pt states that 2 nights ago, she was walking to her bedroom it was dark and she fell. Pt was not able to get up on her own. She states she felt vertigo, "the room spinning" after she fell and is not sure if she lost consciousness or not. She states she has 6/10 pain in her right shoulder. Her neighbor called EMS the next day when she checked in on her. Pt found to be COVID+ in the ED. Also with Right shoulder fracture.     As per patient she was away to [facility] in August to September after Son passed away. She just returned at the end of September. She is visited by her grandkids usually. Marcia is NOK. She reported also stop taking Ozempic last week after she had Nausea and stomach upset but has been tolerating well for last few months.    Patient doing much better today; appear more hydrated; NAD; not needing supplemental oxygen. No fever. No urinary symptoms.   denies SOB, palpitations, chest pain, nausea, vomiting, diarrhea, constipation, dizziness    Vital Signs Last 24 Hrs  T(C): 36.7 (20 Oct 2022 18:04), Max: 36.8 (19 Oct 2022 20:55)  T(F): 98.1 (20 Oct 2022 18:04), Max: 98.2 (19 Oct 2022 20:55)  HR: 83 (20 Oct 2022 18:04) (67 - 92)  BP: 123/62 (20 Oct 2022 18:04) (58/42 - 123/62)  RR: 17 (20 Oct 2022 18:04) (17 - 18)  SpO2: 96% (20 Oct 2022 18:04) (93% - 98%)  room air    P/E:  elderly female, comfortable at rest , NAD  Psych: AAO x3  Neuro: No gross focal deficits; Power and sensation intact  CVS: S1S2 present, regular, no edema  Resp: BLAE+, No wheeze or Rhonchi  GI: Soft, BS+, Non tender, non distended  Extr: No  calf tenderness B/L Lower extremities  Right shoulder swelling, tenderness and limited ROM due to fracture; right arm sling in place  Skin: Warm and moist without any rashes    Labs:     no new today (repeat AM)                        12.0   15.06 )-----------( 297      ( 19 Oct 2022 10:12 )             36.7   10-19    138  |  103  |  20<H>  ----------------------------<  144<H>  3.5   |  25  |  0.68  Ca    9.2      19 Oct 2022 10:12  Phos  2.6     10-19  Mg     2.1     10-19    TPro  7.3  /  Alb  3.2<L>  /  TBili  0.6  /  DBili  x   /  AST  20  /  ALT  17  /  AlkPhos  66  10-18      ACC: [id number] EXAM:  CT BRAIN                        ACC: [id number] EXAM:  CT CERVICAL SPINE                        PROCEDURE DATE:  10/19/2022    IMPRESSION:  1. No acute intracranial CT abnormality.  2. No evidence of acute cervical spine fracture or traumatic malalignment, within the limitations of motion artifact.  3. Hemorrhage in the right axillary region, secondary to displaced proximal humeral fracture demonstrated on the shoulder radiographs.    Xray Chest 1 View AP/PA (10.19.22 @ 04:48)  IMPRESSION: No acute infiltrate. Displaced angulated right humeral neck fracture with overlap.    D/D:  s/p Mechanical fall likely due to dehydration with volume depletion due to   COVID 19 infection plus age related physical debility likely  Right shoulder humeral neck fracture s/p fall conservative mgmt as per Ortho  Elevated TSH likely Sick Euthyroid state  Type 2 DM controlled  HTN controlled    Plan:   Continue IV Fluids x 24 hrs more; eating better  Orthopedic consult appreciated;  conservative mgmt; right arm sling in place  Pain control with Tylenol q 8 hrs x 2-3 days routine and Lidocaine patch; Use Tramadol PRN for breakthrough severe pain  Continuer Metoprolol ER 12.5 mg daily  PT eval; Feed with assistance  Discussed with patient potential need for IRIS as she lives alone; Patient in agreement if indicated; await PT   ISS for now; I have d/w Patient that her GI symptoms last week for which she stopped Ozempic is likely related to underlying COVID she likely contracted last couple of weeks rather than SE of Ozempic which she tolerated well past few months.   Will resume Ozempic as outpatient; May resume Metformin AM if sugars elevated persistently above 150; still 120 to 150 range.   DVT ppx with Lovenox will increase to BID as D-dimer was elevated >1000  Repeat inflammatory markers in AM (ordered)  Follow up TSH as outpatient; If still inhouse will repeat in couple of days    Discussed with patient at length and reviewed findings and plan of care at length as above; verbalized understanding and agree with the plan; D/C Plan 1-2 days based on PT recs.  Discussed with ACP Obdulio

## 2023-08-11 NOTE — ED NOTES
[]Hide copied text    []Candido for details    EMERGENCY DEPARTMENT HISTORY AND PHYSICAL EXAM  This was created with voice recognition software and transcription errors may be present.      9:03 PM  Date: 2/13/2022  Patient Name: Yoon Souza     History of Presenting Illness      Chief Complaint:     History Provided By:      HPI: Yoon Souza is a 63 y.o. female Past medical history of AIDS hep B hep C incisional disease pulmonary TB latent syphilis schizoaffective disorder substance abuse presents with shortness of breath. Patient's been having worsening shortness of breath for the past few days. Worse when she lays down. EMS found her sats in the high 70s. Blood pressure 240. Patient is a dialysis patient reportedly had a full run yesterday. No chest pain no nausea vomiting fever no chills     PCP: Annamarie South MD        Past History      Past Medical History:       Past Medical History:   Diagnosis Date    Acute kidney injury ([facility] [address] [facility]      AIDS (acquired immune deficiency syndrome) ([facility] [facility]      Anemia      Esophageal candidiasis (HCC)      ESRD (end stage renal disease) (HCC)      HCV (hepatitis C virus)      Hepatitis B      Hepatitis C      HIV (human immunodeficiency virus infection) ([facility] [facility]      HIV (human immunodeficiency virus infection) ([facility] [address] [facility]      Late latent syphilis      Pulmonary TB      Schizoaffective disorder ([facility] [address] [facility]      Substance abuse ([facility] [address]           Past Surgical History:        Past Surgical History:   Procedure Laterality Date    HX GYN        HX NEPHRECTOMY         left kidney 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     Social History:  Social History           Tobacco Use    Smoking status: Not on file    Smokeless tobacco: Not on file   Substance Use Topics    Alcohol use: Not on file    Drug use: Not on file         Allergies:        Allergies   Allergen Reactions    Aspirin Hives    Pcn [Penicillins] Hives         Review of Systems      Review of Systems   Unable to perform ROS: Acuity of condition      10 point review of systems otherwise negative unless noted in HPI.     Physical Exam         Physical Exam  Constitutional:       Appearance: She is well-developed. HENT:      Head: Normocephalic and atraumatic. Eyes:      Pupils: Pupils are equal, round, and reactive to light. Cardiovascular:      Rate and Rhythm: Normal rate and regular rhythm. Heart sounds: Normal heart sounds. No murmur heard. No friction rub. Pulmonary:      Effort: Respiratory distress present. Comments: Diffuse Rales  Abdominal:      General: There is no distension. Palpations: Abdomen is soft. Tenderness: There is no abdominal tenderness. There is no guarding or rebound. Musculoskeletal:         General: Normal range of motion. Cervical back: Normal range of motion and neck supple. Skin:     General: Skin is warm and dry. Neurological:      Mental Status: She is alert and oriented to person, place, and time. Psychiatric:         Behavior: Behavior normal.         Thought Content:  Thought content normal.            Diagnostic Study Results      Vital Signs  EKG:  Labs:   Imaging:      Medical Decision Making      ED Course: Progress Notes, Reevaluation, and Consults:     I will be the provider of record for this patient.      Provider Notes (Medical Decision Making): Patient presents with acute shortness of breath likely fluid overload elevated blood pressure will start nitro drip CHF and reassess    Will admit.         Diagnosis      Clinical Impression: No diagnosis found.     Disposition:                Patient's Medications   Start Taking     No medications on file   Continue Taking     DARUNAVIR ETHANOLATE (PREZISTA) 800 MG TABLET    Take 800 mg by mouth daily (with breakfast).     DOLUTEGRAVIR (TIVICAY) 50 MG TAB TABLET    Take 50 mg by mouth daily.     ESOMEPRAZOLE ([address]) 40 MG CAPSULE    Take 40 mg by mouth daily.     GABAPENTIN (NEURONTIN) 100 MG CAPSULE    Take 200 mg by mouth nightly.     LAMIVUDINE (EPIVIR) 150 MG TABLET    Take 75 mg by mouth daily.     MELATONIN 3 MG TABLET    Take 6 mg by mouth nightly.     MIRTAZAPINE (REMERON) 15 MG TABLET    Take 7.5 mg by mouth nightly.     PYRIDOXINE, VITAMIN B6, (VITAMIN B-6) 50 MG TABLET    Take 50 mg by mouth daily.     QUETIAPINE (SEROQUEL) 25 MG TABLET    Take 25 mg by mouth nightly.     RISPERIDONE (RISPERDAL PO)    Take  by mouth.     RITONAVIR (NORVIR) 100 MG TABLET    Take 100 mg by mouth daily.     SENNA (SENOKOT) 8.6 MG TABLET    Take 8.6 mg by mouth two (2) times a day.     TENOFOVIR DISOPROXIL FUMARATE (VIREAD) 300 MG TABLET    Take 300 mg by mouth every seven (7) days.     TRIMETHOPRIM-SULFAMETHOXAZOLE (BACTRIM, SEPTRA)  MG PER TABLET    Take 1 Tablet by mouth every twelve (12) hours.    These Medications have changed     No medications on file   Stop Taking     No medications on file                   Deleted by: Kasia Owusu MD at 02/13/22 [id number] Home